# Patient Record
Sex: MALE | Race: WHITE | NOT HISPANIC OR LATINO | URBAN - METROPOLITAN AREA
[De-identification: names, ages, dates, MRNs, and addresses within clinical notes are randomized per-mention and may not be internally consistent; named-entity substitution may affect disease eponyms.]

---

## 2017-04-27 ENCOUNTER — HISTORICAL (OUTPATIENT)
Dept: INTERNAL MEDICINE | Facility: CLINIC | Age: 62
End: 2017-04-27

## 2017-09-15 ENCOUNTER — HISTORICAL (OUTPATIENT)
Dept: INTERNAL MEDICINE | Facility: CLINIC | Age: 62
End: 2017-09-15

## 2017-10-24 ENCOUNTER — HISTORICAL (OUTPATIENT)
Dept: ENDOSCOPY | Facility: HOSPITAL | Age: 62
End: 2017-10-24

## 2017-11-14 ENCOUNTER — HISTORICAL (OUTPATIENT)
Dept: ENDOSCOPY | Facility: HOSPITAL | Age: 62
End: 2017-11-14

## 2018-03-12 ENCOUNTER — HISTORICAL (OUTPATIENT)
Dept: INTERNAL MEDICINE | Facility: CLINIC | Age: 63
End: 2018-03-12

## 2018-07-04 ENCOUNTER — HOSPITAL ENCOUNTER (OUTPATIENT)
Dept: MEDSURG UNIT | Facility: HOSPITAL | Age: 63
End: 2018-07-06
Attending: HOSPITALIST | Admitting: HOSPITALIST

## 2018-07-05 LAB — CRC RECOMMENDATION EXT: NORMAL

## 2018-07-10 LAB
FINAL CULTURE: NORMAL
FINAL CULTURE: NORMAL

## 2018-10-29 ENCOUNTER — HISTORICAL (OUTPATIENT)
Dept: INTERNAL MEDICINE | Facility: CLINIC | Age: 63
End: 2018-10-29

## 2018-10-31 ENCOUNTER — HISTORICAL (OUTPATIENT)
Dept: ADMINISTRATIVE | Facility: HOSPITAL | Age: 63
End: 2018-10-31

## 2018-11-08 ENCOUNTER — HISTORICAL (OUTPATIENT)
Dept: RADIOLOGY | Facility: HOSPITAL | Age: 63
End: 2018-11-08

## 2019-06-26 ENCOUNTER — HISTORICAL (OUTPATIENT)
Dept: ADMINISTRATIVE | Facility: HOSPITAL | Age: 64
End: 2019-06-26

## 2019-06-26 LAB — PSA SERPL-MCNC: 3.5 NG/ML

## 2019-08-28 ENCOUNTER — HISTORICAL (OUTPATIENT)
Dept: RADIOLOGY | Facility: HOSPITAL | Age: 64
End: 2019-08-28

## 2020-02-04 ENCOUNTER — HISTORICAL (OUTPATIENT)
Dept: ADMINISTRATIVE | Facility: HOSPITAL | Age: 65
End: 2020-02-04

## 2020-02-07 ENCOUNTER — HISTORICAL (OUTPATIENT)
Dept: INTERNAL MEDICINE | Facility: CLINIC | Age: 65
End: 2020-02-07

## 2020-10-13 ENCOUNTER — HISTORICAL (OUTPATIENT)
Dept: ADMINISTRATIVE | Facility: HOSPITAL | Age: 65
End: 2020-10-13

## 2020-10-13 LAB
ABS NEUT (OLG): 4.39 X10(3)/MCL (ref 2.1–9.2)
ALBUMIN SERPL-MCNC: 4.2 GM/DL (ref 3.4–5)
ALBUMIN/GLOB SERPL: 0.9 RATIO (ref 1.1–2)
ALP SERPL-CCNC: 110 UNIT/L (ref 45–117)
ALT SERPL-CCNC: 21 UNIT/L (ref 12–78)
APPEARANCE, UA: CLEAR
AST SERPL-CCNC: 26 UNIT/L (ref 15–37)
BACTERIA #/AREA URNS AUTO: ABNORMAL /HPF
BASOPHILS # BLD AUTO: 0.1 X10(3)/MCL (ref 0–0.2)
BASOPHILS NFR BLD AUTO: 2 %
BILIRUB SERPL-MCNC: 0.6 MG/DL (ref 0.2–1)
BILIRUB UR QL STRIP: NEGATIVE
BILIRUBIN DIRECT+TOT PNL SERPL-MCNC: 0.2 MG/DL (ref 0–0.2)
BILIRUBIN DIRECT+TOT PNL SERPL-MCNC: 0.4 MG/DL
BUN SERPL-MCNC: 8 MG/DL (ref 7–18)
CALCIUM SERPL-MCNC: 9 MG/DL (ref 8.5–10.1)
CHLORIDE SERPL-SCNC: 99 MMOL/L (ref 98–107)
CO2 SERPL-SCNC: 34 MMOL/L (ref 21–32)
COLOR UR: ABNORMAL
CREAT SERPL-MCNC: 1.3 MG/DL (ref 0.6–1.3)
CREAT UR-MCNC: 78 MG/DL
DEPRECATED CALCIDIOL+CALCIFEROL SERPL-MC: 31.3 NG/ML (ref 30–80)
EOSINOPHIL # BLD AUTO: 0.2 X10(3)/MCL (ref 0–0.9)
EOSINOPHIL NFR BLD AUTO: 2 %
ERYTHROCYTE [DISTWIDTH] IN BLOOD BY AUTOMATED COUNT: 13.2 % (ref 11.5–14.5)
EST. AVERAGE GLUCOSE BLD GHB EST-MCNC: 111 MG/DL
GLOBULIN SER-MCNC: 4.5 GM/ML (ref 2.3–3.5)
GLUCOSE (UA): NEGATIVE
GLUCOSE SERPL-MCNC: 88 MG/DL (ref 74–106)
HBA1C MFR BLD: 5.5 % (ref 4.2–6.3)
HCT VFR BLD AUTO: 44.1 % (ref 40–51)
HGB BLD-MCNC: 15.4 GM/DL (ref 13.5–17.5)
HGB UR QL STRIP: 0.06 MG/DL
HYALINE CASTS #/AREA URNS LPF: ABNORMAL /LPF
IMM GRANULOCYTES # BLD AUTO: 0.01 10*3/UL
IMM GRANULOCYTES NFR BLD AUTO: 0 %
KETONES UR QL STRIP: NEGATIVE
LEUKOCYTE ESTERASE UR QL STRIP: NEGATIVE
LYMPHOCYTES # BLD AUTO: 1.8 X10(3)/MCL (ref 0.6–4.6)
LYMPHOCYTES NFR BLD AUTO: 25 %
MCH RBC QN AUTO: 32.3 PG (ref 26–34)
MCHC RBC AUTO-ENTMCNC: 34.9 GM/DL (ref 31–37)
MCV RBC AUTO: 92.5 FL (ref 80–100)
MONOCYTES # BLD AUTO: 0.7 X10(3)/MCL (ref 0.1–1.3)
MONOCYTES NFR BLD AUTO: 10 %
NEUTROPHILS # BLD AUTO: 4.39 X10(3)/MCL (ref 2.1–9.2)
NEUTROPHILS NFR BLD AUTO: 61 %
NITRITE UR QL STRIP: NEGATIVE
PH UR STRIP: 5.5 [PH] (ref 4.5–8)
PLATELET # BLD AUTO: 241 X10(3)/MCL (ref 130–400)
PMV BLD AUTO: 10.3 FL (ref 7.4–10.4)
POTASSIUM SERPL-SCNC: 3.2 MMOL/L (ref 3.5–5.1)
PROT SERPL-MCNC: 8.7 GM/DL (ref 6.4–8.2)
PROT UR QL STRIP: NEGATIVE
PROT UR STRIP-MCNC: <5 MG/DL
PROT/CREAT UR-RTO: <64.1 MG/GM
RBC # BLD AUTO: 4.77 X10(6)/MCL (ref 4.5–5.9)
RBC #/AREA URNS AUTO: ABNORMAL /HPF
SODIUM SERPL-SCNC: 136 MMOL/L (ref 136–145)
SP GR UR STRIP: 1 (ref 1–1.03)
SQUAMOUS #/AREA URNS LPF: ABNORMAL /LPF
UROBILINOGEN UR STRIP-ACNC: NORMAL
WBC # SPEC AUTO: 7.2 X10(3)/MCL (ref 4.5–11)
WBC #/AREA URNS AUTO: ABNORMAL /HPF

## 2021-08-10 ENCOUNTER — HISTORICAL (OUTPATIENT)
Dept: ADMINISTRATIVE | Facility: HOSPITAL | Age: 66
End: 2021-08-10

## 2021-08-10 LAB
ABS NEUT (OLG): 5.75 X10(3)/MCL (ref 2.1–9.2)
ALBUMIN SERPL-MCNC: 4.2 GM/DL (ref 3.4–4.8)
ALBUMIN/GLOB SERPL: 1.1 RATIO (ref 1.1–2)
ALP SERPL-CCNC: 82 UNIT/L (ref 40–150)
ALT SERPL-CCNC: 16 UNIT/L (ref 0–55)
AST SERPL-CCNC: 18 UNIT/L (ref 5–34)
BASOPHILS # BLD AUTO: 0.1 X10(3)/MCL (ref 0–0.2)
BASOPHILS NFR BLD AUTO: 1 %
BILIRUB SERPL-MCNC: 0.9 MG/DL
BILIRUBIN DIRECT+TOT PNL SERPL-MCNC: 0.3 MG/DL (ref 0–0.5)
BILIRUBIN DIRECT+TOT PNL SERPL-MCNC: 0.6 MG/DL (ref 0–0.8)
BUN SERPL-MCNC: 5.8 MG/DL (ref 8.4–25.7)
CALCIUM SERPL-MCNC: 9.6 MG/DL (ref 8.8–10)
CHLORIDE SERPL-SCNC: 102 MMOL/L (ref 98–107)
CHOLEST SERPL-MCNC: 248 MG/DL
CHOLEST/HDLC SERPL: 6 {RATIO} (ref 0–5)
CO2 SERPL-SCNC: 28 MMOL/L (ref 23–31)
CREAT SERPL-MCNC: 1.15 MG/DL (ref 0.73–1.18)
DEPRECATED CALCIDIOL+CALCIFEROL SERPL-MC: 18.2 NG/ML (ref 30–80)
EOSINOPHIL # BLD AUTO: 0.2 X10(3)/MCL (ref 0–0.9)
EOSINOPHIL NFR BLD AUTO: 2 %
ERYTHROCYTE [DISTWIDTH] IN BLOOD BY AUTOMATED COUNT: 13.2 % (ref 11.5–14.5)
EST CREAT CLEARANCE SER (OHS): 58.09 ML/MIN
EST. AVERAGE GLUCOSE BLD GHB EST-MCNC: 96.8 MG/DL
FOLATE SERPL-MCNC: 5.5 NG/ML (ref 7–31.4)
GLOBULIN SER-MCNC: 3.9 GM/DL (ref 2.4–3.5)
GLUCOSE SERPL-MCNC: 99 MG/DL (ref 82–115)
HBA1C MFR BLD: 5 %
HCT VFR BLD AUTO: 43.8 % (ref 40–51)
HDLC SERPL-MCNC: 45 MG/DL (ref 35–60)
HGB BLD-MCNC: 15.3 GM/DL (ref 13.5–17.5)
HIV 1+2 AB+HIV1 P24 AG SERPL QL IA: NONREACTIVE
IMM GRANULOCYTES # BLD AUTO: 0.02 10*3/UL
IMM GRANULOCYTES NFR BLD AUTO: 0 %
LDLC SERPL CALC-MCNC: 177 MG/DL (ref 50–140)
LYMPHOCYTES # BLD AUTO: 2.2 X10(3)/MCL (ref 0.6–4.6)
LYMPHOCYTES NFR BLD AUTO: 25 %
MCH RBC QN AUTO: 32.7 PG (ref 26–34)
MCHC RBC AUTO-ENTMCNC: 34.9 GM/DL (ref 31–37)
MCV RBC AUTO: 93.6 FL (ref 80–100)
MONOCYTES # BLD AUTO: 0.8 X10(3)/MCL (ref 0.1–1.3)
MONOCYTES NFR BLD AUTO: 9 %
NEUTROPHILS # BLD AUTO: 5.75 X10(3)/MCL (ref 2.1–9.2)
NEUTROPHILS NFR BLD AUTO: 63 %
NRBC BLD AUTO-RTO: 0 % (ref 0–0.2)
PLATELET # BLD AUTO: 226 X10(3)/MCL (ref 130–400)
PMV BLD AUTO: 10.6 FL (ref 7.4–10.4)
POTASSIUM SERPL-SCNC: 4 MMOL/L (ref 3.5–5.1)
PROT SERPL-MCNC: 8.1 GM/DL (ref 5.8–7.6)
RBC # BLD AUTO: 4.68 X10(6)/MCL (ref 4.5–5.9)
SODIUM SERPL-SCNC: 141 MMOL/L (ref 136–145)
T PALLIDUM AB SER QL: NONREACTIVE
T4 FREE SERPL-MCNC: 0.93 NG/DL (ref 0.7–1.48)
TRIGL SERPL-MCNC: 129 MG/DL (ref 34–140)
TSH SERPL-ACNC: 2.84 UIU/ML (ref 0.35–4.94)
VIT B12 SERPL-MCNC: 368 PG/ML (ref 213–816)
VLDLC SERPL CALC-MCNC: 26 MG/DL
WBC # SPEC AUTO: 9.1 X10(3)/MCL (ref 4.5–11)

## 2021-08-19 ENCOUNTER — HISTORICAL (OUTPATIENT)
Dept: RADIOLOGY | Facility: HOSPITAL | Age: 66
End: 2021-08-19

## 2021-10-05 ENCOUNTER — HISTORICAL (OUTPATIENT)
Dept: RADIOLOGY | Facility: HOSPITAL | Age: 66
End: 2021-10-05

## 2021-10-05 LAB — CREAT SERPL-MCNC: 1.2 MG/DL (ref 0.73–1.18)

## 2022-04-09 ENCOUNTER — HISTORICAL (OUTPATIENT)
Dept: ADMINISTRATIVE | Facility: HOSPITAL | Age: 67
End: 2022-04-09

## 2022-04-26 VITALS
BODY MASS INDEX: 22.57 KG/M2 | SYSTOLIC BLOOD PRESSURE: 125 MMHG | OXYGEN SATURATION: 100 % | HEIGHT: 66 IN | DIASTOLIC BLOOD PRESSURE: 76 MMHG | WEIGHT: 140.44 LBS

## 2022-04-30 NOTE — OP NOTE
DATE OF SURGERY:    10/24/2017    SURGEON:  Jordon Bazan MD    attending physician:  Jordon Bazan MD    PREOPERATIVE DIAGNOSIS:  Benign prostatic hypertrophy with thickened bladder.    POSTOPERATIVE DIAGNOSIS:  Benign prostatic hypertrophy with thickened bladder.    PROCEDURE PERFORMED:  Cystoscopy.    BLOOD LOSS:  None.    COMPLICATIONS:  None.    FINDINGS:  BPH with mild trabeculation.    HISTORY:  62-year-old male with some obstructive voiding complaints.  He had a CT scan showing thickened bladder wall.  He states since he had gallbladder surgery, he feels like he is leaking urine inside his belly.  He has not had any infections or problems.  He comes in today for cystoscopy.     Informed consent was obtained.  Taken to the cysto suite, placed supine.  He was prepped with Betadine.  Viscous lidocaine instilled into the urethra.  Flexible scope was inserted.  Urethra was unremarkable.  Prostatic fossa with bilobar hypertrophy.  Looking in the bladder, grade 1 trabeculation.  No tumor, stones, or foreign bodies seen.  Ureteral orifices normal size, shape, position, clear efflux bilaterally.  Dome and bladder neck reviewed by retroflexion of the scope.  No evidence of fistula, injury or scarring.    ASSESSMENT:  Benign prostatic hypertrophy with no evidence of injury or lesion.      PLAN:  I am going to see him as needed.      ______________________________  Jordon Bazan MD    TAB/MODL  DD:  10/24/2017  Time:  10:58AM  DT:  10/24/2017  Time:  11:18AM  Job #:  [id number]

## 2022-04-30 NOTE — ED PROVIDER NOTES
"   Patient:   Stoney Hanson             MRN: [id number]            FIN: [id number]               Age:   62 years     Sex:  Male     :  1955   Associated Diagnoses:   None   Author:   Homero Hernadez      Basic Information   Additional information: Chief Complaint from Nursing Triage Note : Chief Complaint   2018 19:24 CDT       Chief Complaint           States nausea, vomiting, diarrhea, rectal bleeding starting "again this afternoon".  States has been having nausea and diarrhea since "intestinal surgery".  .      History of Present Illness   please disregard, duplicate note      Health Status   Allergies:    Allergic Reactions (Selected)  Severity Not Documented  Aspirin- No reactions were documented.  Demerol HCl- Morphine.  Lortab- No reactions were documented.  Morphine- No reactions were documented.  Statins- No reactions were documented.  Sulfamethoxazole- No reactions were documented.,    Allergies (6) Active Reaction  aspirin None Documented  Demerol HCl Morphine  Lortab None Documented  morphine None Documented  statins None Documented  sulfamethoxazole None Documented  .   Medications:  (Selected)   Prescriptions  Prescribed  Artificial Tears preserved ophthalmic solution: 2 drop(s), Eye-Right, BID, PRN PRN as needed for dry eyes, # 15 mL, 0 Refill(s)  brimonidine 0.2% ophthalmic solution: 1 drop(s), OPTH, q8hr, PLEASE DISPENSE TWO 15-CC BOTTLES., # 30 mL, 6 Refill(s)  docusate sodium 100 mg oral tablet: 100 mg = 1 tab(s), Oral, BID, PRN PRN as needed for constipation, # 60 tab(s), 0 Refill(s)  lactulose 10 g/15 mL oral syrup: 20 gm = 30 mL, Oral, BID, PRN PRN as needed for constipation, # 300 mL, 0 Refill(s)  latanoprost 0.005% ophthalmic solution: 1 drop(s), OPTH, Once a day (at bedtime), # 7.5 mL, 6 Refill(s)  Documented Medications  Documented  ALPHAGAN P 0.15% SOL:   PRAVASTATIN SODIUM 10MG TAB: 10 mg = 1 tab(s), Oral, Daily  VIT D2 (1.25MG) 50,000U CAP: 20921 IntUnit = 1 cap(s), Oral, " qWeek.      Past Medical/ Family/ Social History   Medical history:    Active  Hyperlipidemia ([id number])  H/O: glaucoma ([id number])  Resolved  GERD (gastroesophageal reflux disease) ([id number]):  Resolved.  Comments:  9/15/2015 CDT 9:52 CDT - Federico ROTH, Kayla Jade  pt states unsure of dx of reflux.   Surgical history:    Colonoscopy on 2017 at 62 Years.  Comments:  2017 13:55 - Aurora Kumar RN  auto-populated from documented surgical case  Tendon Repair (Right) on 2015 at 59 Years.  Comments:  2015 16:16 - Gia Graham RN  auto-populated from documented surgical case  angiogram in  at 53 Years.  wisdom teeth.  exploratory on ABD.  intestinal rupture.  Cholecystectomy ([id number])..   Family history:    Heart disease  Father  Mother  Cataract.  Mother    Father  .   Social history:    Social & Psychosocial Habits    Alcohol  2017  Use: Never    Employment/School  2015  Status: Unemployed    Highest education: High school    Exercise  2015  Times per week: Daily    Comment: ride a bike - 2015 08:13 - Day Mansi GARCIA    Home/Environment  10/24/2017  Lives with: Mother    Alcohol abuse in household: No    Substance abuse in household: No    Smoker in household: No    Injuries/Abuse/Neglect in household: No    Nutrition/Health  2015  Type of diet: Regular    Wants to lose weight: No    Sleeping concerns: Yes    Feels highly stressed: No    Substance Abuse  07/10/2015  Use: Never    Tobacco  2017  Use: Never smoker  .   Problem list:    Active Problems (8)  BPH with urinary obstruction   GERD (gastroesophageal reflux disease)   Glaucoma, both eyes   H/O: glaucoma   HLD (hyperlipidemia)   Hyperlipidemia   Other specified injury of intrinsic muscle and tendon at ankle and foot level, right foot, initial e   Vitamin D deficiency   .      Physical Examination               Vital  Signs   Vital Signs   7/4/2018 19:24 CDT       Temperature Oral          36.5 DegC                             Temperature Oral (calculated)             97.70 DegF                             Peripheral Pulse Rate     75 bpm                             Respiratory Rate          17 br/min                             SpO2                      100 %                             Oxygen Therapy            Room air                             Systolic Blood Pressure   133 mmHg                             Diastolic Blood Pressure  83 mmHg  .   Measurements   7/4/2018 19:24 CDT       Weight Dosing             69 kg                             Weight Measured and Calculated in Lbs     152.12 lb                             Weight Estimated          69 kg                             Height/Length Estimated   167.74 cm                             Body Mass Index Estimated 24.52 kg/m2  .   Basic Oxygen Information   7/4/2018 19:24 CDT       SpO2                      100 %                             Oxygen Therapy            Room air  .

## 2022-04-30 NOTE — OP NOTE
DATE OF SURGERY:    11/14/2017    SURGEON:  Domingo Gonzalez MD    attending physician:  Dr. Domingo Gonzalez MD    RESIDENT:  Yury Brewster MD.    PROCEDURE PERFORMED:  Colonoscopy.    PREOPERATIVE DIAGNOSIS:  Colonoscopy for screening.    POSTOPERATIVE DIAGNOSIS:  Colonoscopy for screening.    FINDINGS:  Normal colon.    COMPLICATIONS:  None.    SPECIMENS:  None.    ESTIMATED BLOOD LOSS:  Zero.    PROCEDURE IN DETAIL:  After appropriate consents were signed, the patient was taken to the endoscopy suite and placed in left lateral decubitus position after appropriate and audible surgical time-out took place between Anesthesia, Nursing and Surgery.  After which point, MAC anesthesia was induced.  Digital rectal exam showed no significant findings.  After which point, the lubricated colonoscope was inserted in the patient's rectum and taken all the way up to the cecum.  The ileocecal valve and appendiceal stoma were clearly visualized.  After which point, the scope was withdrawn in a 360-degree fashion, visualizing all areas of colonic mucosa.       No significant polyps or fissures or diverticula were found.  The prep was adequate.  The scope was completely withdrawn in 360-degree fashion to the ascending, transverse, descending, sigmoid colon.  In the rectum, the scope was slowly withdrawn.  The patient had a very narrow and tight rectal vault and we were unable to retroflex safely.  The scope was gently withdrawn through the anal canal with full visualization.  There were no fissures, polyps, condylomata or papilloma or obvious congested hemorrhoidal tissue.  The scope was completely removed.  The patient tolerated the procedure well with no obvious complications.  The patient was taken to PACU in good condition.  Dr. Gonzalez was there for the entirety of the procedure.    IMPRESSION/RECOMMENDATIONS:  Negative colonoscopy.  The patient can follow up in 10 years for further  scope.        ______________________________  OLEKSANDR CROW MD    ______________________________  MD MARK Cedeno  DD:  11/14/2017  Time:  02:02PM  DT:  11/14/2017  Time:  02:26PM  Job #:  [id number]

## 2022-04-30 NOTE — ED PROVIDER NOTES
"   Patient:   Stoney Hanson             MRN: [id number]            FIN: [id number]               Age:   62 years     Sex:  Male     :  1955   Associated Diagnoses:   Abdominal pain, acute, generalized; Nephrolithiasis; Leukocytosis; BPH with urinary obstruction   Author:   Alberto BADILLO, Ron VARMA      Basic Information   Time seen: Immediately upon arrival.   History source: Patient.   Arrival mode: Private vehicle.   History limitation: None.   Additional information: Chief Complaint from Nursing Triage Note : Chief Complaint   2018 19:24 CDT       Chief Complaint           States nausea, vomiting, diarrhea, rectal bleeding starting "again this afternoon".  States has been having nausea and diarrhea since "intestinal surgery".  .      History of Present Illness   The patient presents with rectal bleeding.  The onset was 1  days ago.  The course/duration of symptoms is episodic: with a few episodes.  Vomiting: degree minimal.  Rectal bleed: blood with stool and degree moderate.  There are exacerbating factors including eating and drinking.  The relieving factor is none.  Risk factors consist of Multiple abdominal surgeries.  Prior episodes: occasional.  Therapy today: none.  Associated symptoms: abdominal pain, nausea, diarrhea, rectal pain and denies fever.        Review of Systems   Constitutional symptoms:  No fever, no chills, no sweats, no weakness.    Skin symptoms:  No rash, no lesion.    Eye symptoms:  No recent vision problems,    Respiratory symptoms:  No shortness of breath, no orthopnea, no cough, no sputum production.    Cardiovascular symptoms:  No chest pain, no palpitations, no tachycardia, no syncope, no diaphoresis, no peripheral edema.    Gastrointestinal symptoms:  Negative except as documented in HPI, no vomiting, no constipation.    Genitourinary symptoms:  No dysuria, no hematuria, no testicular pain.    Musculoskeletal symptoms:  No back pain, no Joint pain.    Neurologic " symptoms:  No headache, no dizziness.              Additional review of systems information: All other systems reviewed and otherwise negative.      Health Status   Allergies:    Allergic Reactions (Selected)  Severity Not Documented  Aspirin- No reactions were documented.  Demerol HCl- Morphine.  Lortab- No reactions were documented.  Morphine- No reactions were documented.  Statins- No reactions were documented.  Sulfamethoxazole- No reactions were documented.,    Allergies (6) Active Reaction  aspirin None Documented  Demerol HCl Morphine  Lortab None Documented  morphine None Documented  statins None Documented  sulfamethoxazole None Documented  .   Medications:  (Selected)   Inpatient Medications  Ordered  Bentyl 10 mg/mL injectable solution: 20 mg, form: Injection, IM, Once-NOW, first dose 07/04/18 19:46:00 CDT, stop date 07/04/18 19:46:00 CDT, STAT  Protonix: 40 mg, form: Injection, IV Piggyback, Once-NOW, Infuse over: 30 minute(s), first dose 07/04/18 19:46:00 CDT, stop date 07/04/18 19:46:00 CDT, STAT  Prescriptions  Prescribed  Artificial Tears preserved ophthalmic solution: 2 drop(s), Eye-Right, BID, PRN PRN as needed for dry eyes, # 15 mL, 0 Refill(s)  brimonidine 0.2% ophthalmic solution: 1 drop(s), OPTH, q8hr, PLEASE DISPENSE TWO 15-CC BOTTLES., # 30 mL, 6 Refill(s)  docusate sodium 100 mg oral tablet: 100 mg = 1 tab(s), Oral, BID, PRN PRN as needed for constipation, # 60 tab(s), 0 Refill(s)  lactulose 10 g/15 mL oral syrup: 20 gm = 30 mL, Oral, BID, PRN PRN as needed for constipation, # 300 mL, 0 Refill(s)  latanoprost 0.005% ophthalmic solution: 1 drop(s), OPTH, Once a day (at bedtime), # 7.5 mL, 6 Refill(s)  Documented Medications  Documented  ALPHAGAN P 0.15% SOL:   PRAVASTATIN SODIUM 10MG TAB: 10 mg = 1 tab(s), Oral, Daily  VIT D2 (1.25MG) 50,000U CAP: 44752 IntUnit = 1 cap(s), Oral, qWeek.      Past Medical/ Family/ Social History   Medical history:    Active  Hyperlipidemia ([id number])  H/O:  glaucoma ([id number])  Resolved  GERD (gastroesophageal reflux disease) ([id number]):  Resolved.  Comments:  9/15/2015 CDT 9:52 CDT - Federico ROTH, Kayla Jade  pt states unsure of dx of reflux.   Surgical history:    Colonoscopy on 2017 at 62 Years.  Comments:  2017 13:55 - Aurora Kumar RN  auto-populated from documented surgical case  Tendon Repair (Right) on 2015 at 59 Years.  Comments:  2015 16:16 - Gia Graham RN  auto-populated from documented surgical case  angiogram in  at 53 Years.  wisdom teeth.  exploratory on ABD.  intestinal rupture.  Cholecystectomy ([id number])..   Family history:    Heart disease  Father  Mother  Cataract.  Mother    Father  .   Social history:    Social & Psychosocial Habits    Alcohol  2017  Use: Never    Employment/School  2015  Status: Unemployed    Highest education: High school    Exercise  2015  Times per week: Daily    Comment: ride a bike - 2015 08:13 - Day Mansi GARCIA    Home/Environment  10/24/2017  Lives with: Mother    Alcohol abuse in household: No    Substance abuse in household: No    Smoker in household: No    Injuries/Abuse/Neglect in household: No    Nutrition/Health  2015  Type of diet: Regular    Wants to lose weight: No    Sleeping concerns: Yes    Feels highly stressed: No    Substance Abuse  07/10/2015  Use: Never    Tobacco  2017  Use: Never smoker  , Alcohol use: Denies, Tobacco use: Denies, Drug use: Denies.   Problem list:    Active Problems (8)  BPH with urinary obstruction   GERD (gastroesophageal reflux disease)   Glaucoma, both eyes   H/O: glaucoma   HLD (hyperlipidemia)   Hyperlipidemia   Other specified injury of intrinsic muscle and tendon at ankle and foot level, right foot, initial e   Vitamin D deficiency   .      Physical Examination               Vital Signs   Vital Signs   2018 19:24 CDT        Temperature Oral          36.5 DegC                             Temperature Oral (calculated)             97.70 DegF                             Peripheral Pulse Rate     75 bpm                             Respiratory Rate          17 br/min                             SpO2                      100 %                             Oxygen Therapy            Room air                             Systolic Blood Pressure   133 mmHg                             Diastolic Blood Pressure  83 mmHg  .      Vital Signs (last 24 hrs)_____  Last Charted___________  Temp Oral     36.5 DegC  (JUL 04 19:24)  Heart Rate Peripheral   75 bpm  (JUL 04 19:24)  Resp Rate         17 br/min  (JUL 04 19:24)  SBP      133 mmHg  (JUL 04 19:24)  DBP      83 mmHg  (JUL 04 19:24)  SpO2      100 %  (JUL 04 19:24)  .   Measurements   7/4/2018 19:24 CDT       Weight Dosing             69 kg                             Weight Measured and Calculated in Lbs     152.12 lb                             Weight Estimated          69 kg                             Height/Length Estimated   167.74 cm                             Body Mass Index Estimated 24.52 kg/m2  .   Basic Oxygen Information   7/4/2018 19:24 CDT       SpO2                      100 %                             Oxygen Therapy            Room air  .   General:  Alert, no acute distress, anxious.    Skin:  Warm, dry, pink, intact, no pallor, no rash.    Head:  Normocephalic, atraumatic.    Neck:  Supple, no JVD.    Eye:  Pupils are equal, round and reactive to light, normal conjunctiva.    Ears, nose, mouth and throat:  Oral mucosa moist.   Cardiovascular:  Regular rate and rhythm, No murmur, Normal peripheral perfusion, No edema.    Respiratory:  Lungs are clear to auscultation, respirations are non-labored, breath sounds are equal, Symmetrical chest wall expansion.    Gastrointestinal:  Soft, Non distended, No organomegaly, Palpable bladder, Tenderness: Moderate, generalized, Guarding:  Minimal, voluntary, Rebound: Negative, Bowel sounds: Hyperactive, Rectal exam: Normal tone, stool color clear, guaiac (positive,  OK), no hemorrhoids.    Back:  Nontender, Normal range of motion.    Musculoskeletal:  Normal ROM, normal strength, no swelling.    Neurological:  Alert and oriented to person, place, time, and situation, No focal neurological deficit observed, normal motor observed, normal speech observed, normal coordination observed.    Psychiatric:  Cooperative, appropriate mood & affect.       Medical Decision Making   Documents reviewed:  Emergency department records, prior records.    Results review:  Lab results : [facility]   7/4/2018 19:47 CDT       WBC                       16.5 x10(3)/mcL  HI                             Hgb                       15.7 gm/dL                             Hct                       43.5 %                             Platelet                  263 x10(3)/mcL                             Neutro Auto               83 x10(3)/mcL  NA                             Lymph Auto                9 %  LOW                             Mono Auto                 6 %    , Lab results : [facility]   7/4/2018 20:09 CDT       UA Spec Grav              1.003  LOW                             UA pH                     6.0                             UA Nitrite                Negative                             UA Leuk Est               Negative                             UA WBC Interp             0-2 /HPF                             UA RBC Interp             0-2                             UA Bact Interp            None Seen    ,    No qualifying data available.    Radiology results:  Rad Results (ST)  < 12 hrs   Accession: [id number]  Order: CT Abdomen and Pelvis W Contrast  Report Dt/Tm: 07/04/2018 21:40  Report:   EXAMINATION: CT abdomen pelvis with contrast     EXAMINATION DATE: 7/4/2018     COMPARISON: CT abdomen pelvis from March 27, 2017     TECHNIQUE: Multiple  cross-sectional images of the abdomen and pelvis  were obtained after the intravenous ministration of contrast. Coronal  and sagittal reformatted images were obtained.     CLINICAL HISTORY: Nausea and vomiting with rectal bleeding.     FINDINGS:     Dependent atelectatic changes of the lungs. The heart size is within  normal limits.     Several hypodensities of the liver identified which are too small to  characterize and likely represent simple cysts. The bladder is  surgically absent. The spleen, adrenals, and pancreas are normal. The  kidneys are symmetric in size with a simple cyst involving the right  kidney nonobstructing changes involving the lower pole the left  kidney.     The small bowel is of normal caliber. The colon is decompressed  distally. Proximally the colon is gaseously distended. At the abrupt  transition point no mass is identified. No adjacent lymphadenopathy.  Normal-appearing appendix.     The prostate is enlarged with dystrophic calcification. Bladder is  distended with symmetric wall thickening suggestive of chronic bladder  outlet obstruction. No free fluid in the abdomen or pelvis. No  lymphadenopathy.     No suspicious osseous lesions. Soft tissues are normal.     IMPRESSION:   1. Abrupt transition of the colon caliber involving the mid aspect of  the transverse colon. No definitive mass is identified. Could relate  to peristalsis, however, underlying mass is not entirely excluded.  Recommend direct visualization.  2. Nonobstructing calculus involving the lower pole the left kidney.  3. Other secondary findings as above.        .      Impression and Plan   Diagnosis   Abdominal pain, acute, generalized (QXQ30-SW R10.84)   Nephrolithiasis (GST16-UF N20.0)   Leukocytosis (TFG60-LG D72.829)   BPH with urinary obstruction (IQY33-HH N40.1)      Calls-Consults   -  7/4/2018 21:47:00 , Medicine, consult.    Plan   Condition: Stable.    Disposition: Admit time  7/4/2018 21:47:00, Place in  Observation Unit.    Counseled: Patient, Regarding diagnosis, Regarding diagnostic results, Regarding treatment plan, Patient indicated understanding of instructions.

## 2022-04-30 NOTE — PROGRESS NOTES
Patient:   Stoney Hanson             MRN: [id number]            FIN: [id number]               Age:   63 years     Sex:  Male     :  1955   Associated Diagnoses:   None   Author:   Ximena BADILLO, [place] reviewed: Will discuss with patient during follow up appointment on  Oct. 31, 2018 at 10:50am.

## 2022-05-02 NOTE — HISTORICAL [facility]
This is a historical note converted from Thor. Formatting and pictures may have been removed.  Please reference Thor for original formatting and attached multimedia. Chief Complaint  f/u for bph  History of Present Illness  Pt with BPH.? Here with what appears to epididymitis.? Scrotal US nonimpressive.? Denies hernia symptoms.  ?   Took flomax and did not help.? Given uroxatrol and proscar.? Did not fill.? Does not have any complauints abouat his urine today though I doubt.? Needs a PSA.  Review of Systems  12 point ROS negative other than HPI  Physical Exam  Vitals & Measurements  T:?36.4? ?C (Oral)? HR:?66(Peripheral)? RR:?18? BP:?130/82? SpO2:?100%? WT:?68.4?kg?  GEN: WNWD NAD  HEENT: NCAT  CV: RRR  RESP: unlabored  ABD: soft NT/ND  : bladder nondistended  EXT: no C/C/E  NEURO: AAOx4 no focal deficits  Assessment/Plan  1.?BPH with urinary obstruction?N40.1  ?-PSA today.? Take meds at his discretion.? PRN.? Follow up with PCP for yearly screening.  Ordered:  Office/Outpatient Visit Level 2 Established [id number] PC, BPH with urinary obstruction, [facility], 06/26/19 11:32:00 CDT  Prostate Specific Antigen, Now collect, 06/26/19 11:32:00 CDT, Blood, Stop date 06/26/19 11:33:00 CDT, Lab Collect, BPH with urinary obstruction, 06/26/19 11:32:00 CDT  ?  Orders:  Clinic Follow-up PRN, 06/26/19 11:32:00 CDT   Problem List/Past Medical History  Ongoing  BPH with urinary obstruction  GERD (gastroesophageal reflux disease)  Glaucoma, both eyes  HLD (hyperlipidemia)  Hyperlipidemia  Knowledge deficit  Other specified injury of intrinsic muscle and tendon at ankle and foot level, right foot, initial encounter  Vitamin D deficiency  Historical  GERD (gastroesophageal reflux disease)  H/O: glaucoma  Procedure/Surgical History  Biopsy Gastrointestinal (None) (07/06/2018)  Colonoscopy (None) (07/06/2018)  Colonoscopy, flexible; with biopsy, single or multiple (07/06/2018)  Excision of Descending Colon, Via Natural or Artificial  Opening Endoscopic, Diagnostic (07/06/2018)  Excision of Sigmoid Colon, Via Natural or Artificial Opening Endoscopic, Diagnostic (07/06/2018)  Colonoscopy (11/14/2017)  Colonoscopy, flexible; diagnostic, including collection of specimen(s) by brushing or washing, when performed (separate procedure) (11/14/2017)  Inspection of Lower Intestinal Tract, Via Natural or Artificial Opening Endoscopic (11/14/2017)  Cystourethroscopy (separate procedure) (10/24/2017)  Inspection of Bladder, Via Natural or Artificial Opening Endoscopic (10/24/2017)  Arthrotomy, with synovectomy, ankle; including tenosynovectomy (09/22/2015)  Other plastic operations on tendon (09/22/2015)  Other tenonectomy (09/22/2015)  Repair, dislocating peroneal tendons; without fibular osteotomy (09/22/2015)  Synovectomy of ankle (09/22/2015)  Tendon Repair (Right) (09/22/2015)  angiogram (2008)  Cholecystectomy  exploratory on ABD  intestinal rupture  wisdom teeth   Medications  brimonidine 0.2% ophthalmic solution, 1 drop(s), Eye-Both, q8hr, 6 refills  latanoprost 0.005% ophthalmic solution, 1 drop(s), Eye-Both, Once a day (at bedtime), 6 refills  Pepcid 20 mg oral tablet, 20 mg= 1 tab(s), Oral, BID,? ?Not taking  Proscar 5 mg oral tablet, 5 mg= 1 tab(s), Oral, Daily, 11 refills,? ?Not taking  Uroxatral 10 mg oral tablet, extended release, 10 mg= 1 tab(s), Oral, qPM, 11 refills,? ?Not taking  Allergies  Demerol HCl?(Morphine)  Lortab  aspirin  morphine  statins  sulfamethoxazole  Social History  Abuse/Neglect  No, 06/13/2019  Alcohol  Never, 01/23/2017  Employment/School  Unemployed, Highest education level: High school., 09/03/2015  Exercise  Exercise frequency: Daily., 09/03/2015  Home/Environment  Lives with Mother. Alcohol abuse in household: No. Substance abuse in household: No. Smoker in household: No. Injuries/Abuse/Neglect in household: No., 10/24/2017  Nutrition/Health  Regular, Wants to lose weight: No. Sleeping concerns: Yes. Feels highly  stressed: No., 2015  Sexual  Sexual orientation: Straight or heterosexual. Gender Identity Identifies as male., 05/15/2019  Substance Use  Never, 07/10/2015  Tobacco  Never (less than 100 in lifetime), N/A, 2019  Never (less than 100 in lifetime), N/A, 2019  Family History  Cataract.: Mother.  : Father.  Heart disease: Mother and Father.  Immunizations  Vaccine Date Status   tetanus-diphtheria toxoids 2015 Given   Health Maintenance  Health Maintenance  ???Pending?(in the next year)  ??? ??OverDue  ??? ? ? ?Diabetes Screening due??and every?  ??? ? ? ?Alcohol Misuse Screening due??19??and every 1??year(s)  ??? ??Due?  ??? ? ? ?ADL Screening due??19??and every 1??year(s)  ??? ? ? ?Zoster Vaccine due??19??and every 100??year(s)  ??? ??Due In Future?  ??? ? ? ?Obesity Screening not due until??20??and every 1??year(s)  ??? ? ? ?Blood Pressure Screening not due until??20??and every 1??year(s)  ??? ? ? ?Body Mass Index Check not due until??20??and every 1??year(s)  ??? ? ? ?Depression Screening not due until??20??and every 1??year(s)  ???Satisfied?(in the past 1 year)  ??? ??Satisfied?  ??? ? ? ?Blood Pressure Screening on??19.??Satisfied by Destiny Ricci LPN  ??? ? ? ?Body Mass Index Check on??19.??Satisfied by Stephani Ricci LPNe  ??? ? ? ?Colorectal Screening on??18.  ??? ? ? ?Depression Screening on??19.??Satisfied by Destiny Ricci LPN  ??? ? ? ?Diabetes Screening on??19.??Satisfied by Dane Guzman Jr.  ??? ? ? ?Influenza Vaccine on??19.??Satisfied by Krupa Arceo LPN  ??? ? ? ?Lipid Screening on??10/29/18.??Satisfied by Luzmaria Rodarte  ??? ? ? ?Obesity Screening on??19.??Satisfied by Destiny Ricci LPN  ??? ? ? ?Smoking Cessation (Coronary Artery Disease) on??18.??Satisfied by Vin Ornelas RN, Yahir KERR  ?
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
## 2022-05-02 NOTE — HISTORICAL [facility]ER
This is a historical note converted from [employer]. Formatting and pictures may have been removed.  Please reference [facility] for original formatting and attached multimedia. Chief Complaint  Follow Up. Pt states that he is having trouble taking what he has in his mind and putting that into words x several months  History of Present Illness  A 65 year old male with history of BPH followed by Urology, hypertension, hyperlipidemia, and chronic kidney disease presents to clinic for a follow up?appointment.  He states that he has been feeling fine.  He denies any nausea, vomiting, and fever. ?Patient also states no changes?in weight and appetite.  Patient denies chest pain and shortness of breath.?  He does report that he has a difficulty time talking. He states he knows what he wants to say, but it does not come out. He does report some memory issues.  He denies any recent trauma. He states that he woke up like this sometime around April.  Review of Systems  ???? Constitutional: No fever, No chills, No sweats, No weakness, No fatigue, No decreased activity.  ?????Eye: No recent visual problem, No icterus, No double vision.  ?????Ear/Nose/Mouth/Throat: No nasal congestion, No sore throat.  ?????Respiratory: No shortness of breath, No cough, No sputum production, No hemoptysis, No wheezing, No cyanosis.  ?????Cardiovascular: No chest pain, No palpitations, No peripheral edema, No syncope.  ?????Gastrointestinal: No nausea, No vomiting, No diarrhea, No constipation, No hematemesis.  ?????Genitourinary: No dysuria, No hematuria, No change in urine stream, No urethral discharge.  ?????Hematology/Lymphatics: No bruising tendency, No bleeding tendency.  ?????Endocrine: No polyuria, No cold intolerance, No heat intolerance.  ?????Immunologic: Not immunocompromised, No recurrent fevers.  ?????Musculoskeletal: No joint pain, No claudication.  ?????Integumentary: No rash, No pruritus, No abrasions, No petechiae.  ?????Neurologic:  Alert and oriented X4, No abnormal balance, No numbness, No tingling.  ?????Psychiatric: No anxiety, no depression, Not suicidal, Not delusional, No hallucinations.  Physical Exam  Vitals & Measurements  T:?36.6? ?C (Oral)? HR:?61(Peripheral)? BP:?125/76?  HT:?168.00?cm? WT:?63.700?kg? BMI:?22.57?  ???? General: Alert and oriented, No acute distress.  ?????Eye: Pupils are equal, round and reactive to light, Extraocular movements are intact, Normal conjunctiva, Vision unchanged.  ?????HENT: Normocephalic, Normal hearing, Oral mucosa is moist.  ?????Neck: Supple, No carotid bruit, No jugular venous distention, No thyromegaly.  ?????Respiratory: Lungs are clear to auscultation, Respirations are non-labored, Breath sounds are equal, Symmetrical chest wall expansion, No chest wall tenderness.  ?????Cardiovascular: Normal rate, Regular rhythm, No murmur, No gallop, Good pulses equal in all extremities, Normal peripheral perfusion, No edema.  ?????Gastrointestinal: Soft, Non-tender, Non-distended, Normal bowel sounds.  ?????Genitourinary: No costovertebral angle tenderness, No suprapubic tenderness.  ?????Musculoskeletal: Normal range of motion, Normal strength, No swelling, No deformity, Normal gait.  ?????Integumentary: Warm, No pallor, No rash.  ?????Neurologic: Alert, Oriented, Normal sensory, Normal motor function, No focal deficits, Cranial Nerves II-XII are grossly intact.  ?????Psychiatric: Cooperative, Appropriate mood & affect.  Assessment/Plan  1.?Cognitive impairment ? R41.89  Short term memory loss  Patient scored 25 on MMSE  Broca aphasia?-Patient speech is changed from last time I spoke with him.  MRI for further evaluation. GFR 72  B12 ordered  2.?Chronic kidney disease, stage 2 (mild) ? N18.2  Repeat labs.  Increase water intake and avoiding nephrotoxic drugs  3.?BPH with urinary obstruction ? N40.1  Continue to follow up with Nephrology  4.?HLD (hyperlipidemia) ? E78.5  Continue statin  5.?GERD  (gastroesophageal reflux disease) ? K21.9  Reports no new complaints  Managed without medications  6.?Glaucoma, both eyes ? H40.9  Continue following Ophthalmology  7.?Vitamin D deficiency ? E55.9  Replete  8.?Healthcare maintenance ? Z00.00  Colon cancer screening- 2018, per note- patient should be evaluated for IBD, will need a follow up with GI.  He is not interested in Shingles vaccination at this time.  Pneumococcal vaccine at next visit.  Orders:  CBC w/ Auto Diff, Routine collect, *Est. 08/10/21 3:00:00 CDT, Blood, Order for future visit, *Est. Stop date 08/10/21 3:00:00 CDT, Lab Collect, Screening due, 08/10/21 13:54:00 CDT  Clinic Follow up, *Est. 09/07/21 3:00:00 CDT, Order for future visit, Cognitive impairment  Chronic kidney disease, stage 2 (mild)  BPH with urinary obstruction  HLD (hyperlipidemia)  GERD (gastroesophageal reflux disease)  Glaucoma, both eyes  Vitamin D deficien...  Comprehensive Metabolic Panel, Routine collect, *Est. 08/10/21 3:00:00 CDT, Blood, Order for future visit, *Est. Stop date 08/10/21 3:00:00 CDT, Lab Collect, Screening due, 08/10/21 13:54:00 CDT  Folate Level, Routine collect, *Est. 08/10/21 3:00:00 CDT, Blood, Order for future visit, *Est. Stop date 08/10/21 3:00:00 CDT, Lab Collect, Cognitive impairment, 08/10/21 13:55:00 CDT  Free T4, Routine collect, *Est. 08/10/21 3:00:00 CDT, Blood, Order for future visit, *Est. Stop date 08/10/21 3:00:00 CDT, Lab Collect, Cognitive impairment, 08/10/21 13:54:00 CDT  Hemoglobin A1C UHC, Routine collect, *Est. 08/10/21 3:00:00 CDT, Blood, Order for future visit, *Est. Stop date 08/10/21 3:00:00 CDT, Lab Collect, Screening due, 08/10/21 13:54:00 CDT  HIV 1 and 2, Routine collect, *Est. 08/10/21 3:00:00 CDT, Blood, Order for future visit, *Est. Stop date 08/10/21 3:00:00 CDT, Lab Collect, Cognitive impairment, 08/10/21 13:54:00 CDT  Lipid Panel, Routine collect, *Est. 08/10/21 3:00:00 CDT, Blood, Order for future visit, *Est. Stop  date 08/10/21 3:00:00 CDT, Lab Collect, HLD (hyperlipidemia), 08/10/21 13:54:00 CDT  MRI Brain W/O Contrast, Routine, 08/10/21 13:57:00 CDT, Other (please specify), Neuro deficit, acute, stroke suspected, None, Ambulatory, Patient Has IV?, Rad Type, Order for future visit, Cognitive impairment  Neurologic abnormality  Aphasia, Schedule this test, [facility]...  Office/Outpatient Visit Level 4 Established [id number] PC, Cognitive impairment  Chronic kidney disease, stage 2 (mild)  BPH with urinary obstruction  HLD (hyperlipidemia)  GERD (gastroesophageal reflux disease)  Glaucoma, both eyes  Vitamin D deficiency  Healthcare maintenance, [facility],...  Syphilis Antibody (with Reflex RPR), Routine collect, *Est. 08/10/21 3:00:00 CDT, Blood, Order for future visit, *Est. Stop date 08/10/21 3:00:00 CDT, Lab Collect, Cognitive impairment, 08/10/21 13:55:00 CDT  Thyroid Stimulating Hormone, Routine collect, *Est. 08/10/21 3:00:00 CDT, Blood, Order for future visit, *Est. Stop date 08/10/21 3:00:00 CDT, Lab Collect, Cognitive impairment, 08/10/21 13:54:00 CDT  Vitamin B12 Level, Routine collect, *Est. 08/10/21 3:00:00 CDT, Blood, Order for future visit, *Est. Stop date 08/10/21 3:00:00 CDT, Lab Collect, Cognitive impairment, 08/10/21 13:55:00 CDT  Vitamin D, 25-Hydroxy Level, Routine collect, *Est. 08/10/21 3:00:00 CDT, Blood, Order for future visit, *Est. Stop date 08/10/21 3:00:00 CDT, Lab Collect, Cognitive impairment, 08/10/21 13:55:00 CDT   Problem List/Past Medical History  Ongoing  BPH with urinary obstruction  GERD (gastroesophageal reflux disease)  Glaucoma, both eyes  HLD (hyperlipidemia)  Hyperlipidemia  Knowledge deficit  Other specified injury of intrinsic muscle and tendon at ankle and foot level, right foot, initial encounter  Vitamin D deficiency  Historical  GERD (gastroesophageal reflux disease)  H/O: glaucoma  Procedure/Surgical History  Repair Right Hand Skin, External Approach  (11/24/2020)  Simple repair of superficial wounds of scalp, neck, axillae, external genitalia, trunk and/or extremities (including hands and feet); 2.5 cm or less (11/24/2020)  Biopsy Gastrointestinal (None) (07/06/2018)  Colonoscopy (None) (07/06/2018)  Colonoscopy, flexible; with biopsy, single or multiple (07/06/2018)  Excision of Descending Colon, Via Natural or Artificial Opening Endoscopic, Diagnostic (07/06/2018)  Excision of Sigmoid Colon, Via Natural or Artificial Opening Endoscopic, Diagnostic (07/06/2018)  Colonoscopy (11/14/2017)  Colonoscopy, flexible; diagnostic, including collection of specimen(s) by brushing or washing, when performed (separate procedure) (11/14/2017)  Inspection of Lower Intestinal Tract, Via Natural or Artificial Opening Endoscopic (11/14/2017)  Cystourethroscopy (separate procedure) (10/24/2017)  Inspection of Bladder, Via Natural or Artificial Opening Endoscopic (10/24/2017)  Arthrotomy, with synovectomy, ankle; including tenosynovectomy (09/22/2015)  Other plastic operations on tendon (09/22/2015)  Other tenonectomy (09/22/2015)  Repair, dislocating peroneal tendons; without fibular osteotomy (09/22/2015)  Synovectomy of ankle (09/22/2015)  Tendon Repair (Right) (09/22/2015)  angiogram (2008)  Cholecystectomy  exploratory on ABD  intestinal rupture  wisdom teeth   Medications  bacitracin topical, 1 megan, TOP, Once  brimonidine 0.2% ophthalmic solution, 1 drop(s), Eye-Left, q8hr, 11 refills  diclofenac 1% topical gel, 1 megan, TOP, QID, PRN,? ?Not taking  latanoprost 0.005% ophthalmic solution, 1 drop(s), Eye-Left, Once a day (at bedtime), 11 refills  Allergies  Demerol HCl?(Morphine)  Lortab  aspirin  egg-containing compound?(UNKNOWN)  morphine  statins  sulfamethoxazole  Social History  Abuse/Neglect  No, 08/10/2021  No, 07/20/2021  Alcohol  Past, Wine, 11/30/2020  Employment/School  Unemployed, Highest education level: High school., 09/03/2015  Exercise  Exercise frequency:  Daily., 2015  Home/Environment  Lives with Mother. Alcohol abuse in household: No. Substance abuse in household: No. Smoker in household: No. Injuries/Abuse/Neglect in household: No., 10/24/2017  Nutrition/Health  Regular, Wants to lose weight: No. Sleeping concerns: Yes. Feels highly stressed: No., 2015  Sexual  Gender Identity Identifies as female., 2021  Substance Use  Never, 07/10/2015  Tobacco  Never (less than 100 in lifetime), N/A, 08/10/2021  Never (less than 100 in lifetime), N/A, 2021  Family History  Cataract.: Mother.  : Father.  Heart disease: Mother and Father.  Immunizations  Vaccine Date Status   tetanus/diphtheria/pertussis, acel(Tdap) 2020 Given   pneumococcal 13-valent conjugate vaccine 10/13/2020 Given   influenza virus vaccine, inactivated 10/13/2020 Given   tetanus-diphtheria toxoids 2015 Given   Health Maintenance  Health Maintenance  ???Pending?(in the next year)  ??? ??OverDue  ??? ? ? ?Alcohol Misuse Screening due??19??and every 1??year(s)  ??? ? ? ?Advance Directive due??21??and every 1??year(s)  ??? ? ? ?Cognitive Screening due??21??and every 1??year(s)  ??? ? ? ?ADL Screening due??21??and every 1??year(s)  ??? ??Due?  ??? ? ? ?IPPE due??08/10/21??One-time only  ??? ? ? ?Medicare Annual Wellness Exam due??08/10/21??and every 1??year(s)  ??? ? ? ?Zoster Vaccine due??08/10/21??Unknown Frequency  ??? ??Due In Future?  ??? ? ? ?Blood Pressure Screening not due until??10/13/21??and every 1??year(s)  ??? ? ? ?Depression Screening not due until??21??and every 1??year(s)  ??? ? ? ?Obesity Screening not due until??22??and every 1??year(s)  ??? ? ? ?Fall Risk Assessment not due until??22??and every 1??year(s)  ??? ? ? ?Functional Assessment not due until??22??and every 1??year(s)  ??? ? ? ?Body Mass Index Check not due until??22??and every 1??year(s)  ???Satisfied?(in the past 1 year)  ???  ??Satisfied?  ??? ? ? ?Blood Pressure Screening on??08/10/21.??Satisfied by Deepali Soto  ??? ? ? ?Body Mass Index Check on??08/10/21.??Satisfied by Deepali Soto  ??? ? ? ?Depression Screening on??11/30/20.??Satisfied by Kolby Hess  ??? ? ? ?Diabetes Screening on??10/13/20.??Satisfied by Olivier Leach  ??? ? ? ?Fall Risk Assessment on??07/20/21.??Satisfied by RADHA Arceo, [place]  ??? ? ? ?Functional Assessment on??08/10/21.??Satisfied by Deepali Soto  ??? ? ? ?Influenza Vaccine on??11/25/20.??Satisfied by RADHA Arceo, [place]  ??? ? ? ?Obesity Screening on??08/10/21.??Satisfied by Deepali Soto  ??? ? ? ?Pneumococcal Vaccine on??10/13/20.??Satisfied by Deepali Soto  ??? ? ? ?Tetanus Vaccine on??11/24/20.??Satisfied by Geena Arceo RN  ??? ??Refused?  ??? ? ? ?Zoster Vaccine on??08/10/21.??Recorded by Deepali Soto  ?

## 2022-12-09 ENCOUNTER — DOCUMENTATION ONLY (OUTPATIENT)
Dept: INTERNAL MEDICINE | Facility: CLINIC | Age: 67
End: 2022-12-09

## 2023-03-10 ENCOUNTER — HOSPITAL ENCOUNTER (INPATIENT)
Facility: HOSPITAL | Age: 68
LOS: 6 days | Discharge: NURSING, REHAB OR LONG-TERM CARE | DRG: 057 | End: 2023-03-16
Attending: EMERGENCY MEDICINE | Admitting: INTERNAL MEDICINE
Payer: MEDICARE

## 2023-03-10 DIAGNOSIS — I63.9 ACUTE CVA (CEREBROVASCULAR ACCIDENT): ICD-10-CM

## 2023-03-10 DIAGNOSIS — M62.82 RHABDOMYOLYSIS: Primary | ICD-10-CM

## 2023-03-10 DIAGNOSIS — L89.90 PRESSURE INJURY OF SKIN, UNSPECIFIED INJURY STAGE, UNSPECIFIED LOCATION: ICD-10-CM

## 2023-03-10 DIAGNOSIS — T14.90XA TRAUMA: ICD-10-CM

## 2023-03-10 DIAGNOSIS — F03.90 DEMENTIA, UNSPECIFIED DEMENTIA SEVERITY, UNSPECIFIED DEMENTIA TYPE, UNSPECIFIED WHETHER BEHAVIORAL, PSYCHOTIC, OR MOOD DISTURBANCE OR ANXIETY: ICD-10-CM

## 2023-03-10 DIAGNOSIS — R52 PAIN: ICD-10-CM

## 2023-03-10 DIAGNOSIS — R29.6 UNWITNESSED FALL: ICD-10-CM

## 2023-03-10 DIAGNOSIS — R53.1 RIGHT SIDED WEAKNESS: ICD-10-CM

## 2023-03-10 LAB
ALBUMIN SERPL-MCNC: 3.4 G/DL (ref 3.4–4.8)
ALBUMIN/GLOB SERPL: 1.1 RATIO (ref 1.1–2)
ALP SERPL-CCNC: 85 UNIT/L (ref 40–150)
ALT SERPL-CCNC: 20 UNIT/L (ref 0–55)
AMPHET UR QL SCN: NEGATIVE
ANION GAP SERPL CALC-SCNC: 17 MMOL/L (ref 8–16)
APPEARANCE UR: CLEAR
APTT PPP: 23.7 SECONDS (ref 23.2–33.7)
AST SERPL-CCNC: 51 UNIT/L (ref 5–34)
AV INDEX (PROSTH): 0.76
AV MEAN GRADIENT: 3 MMHG
AV PEAK GRADIENT: 5 MMHG
AV VALVE AREA: 2.65 CM2
AV VELOCITY RATIO: 0.77
BACTERIA #/AREA URNS AUTO: ABNORMAL /HPF
BARBITURATE SCN PRESENT UR: NEGATIVE
BASOPHILS # BLD AUTO: 0.03 X10(3)/MCL (ref 0–0.2)
BASOPHILS NFR BLD AUTO: 0.2 %
BENZODIAZ UR QL SCN: NEGATIVE
BILIRUB UR QL STRIP.AUTO: NEGATIVE MG/DL
BILIRUBIN DIRECT+TOT PNL SERPL-MCNC: 1.1 MG/DL
BSA FOR ECHO PROCEDURE: 1.71 M2
BUN SERPL-MCNC: 16 MG/DL (ref 6–30)
BUN SERPL-MCNC: 16 MG/DL (ref 8.4–25.7)
CALCIUM SERPL-MCNC: 8.9 MG/DL (ref 8.8–10)
CANNABINOIDS UR QL SCN: NEGATIVE
CHLORIDE SERPL-SCNC: 100 MMOL/L (ref 95–110)
CHLORIDE SERPL-SCNC: 104 MMOL/L (ref 98–107)
CHOLEST SERPL-MCNC: 178 MG/DL
CHOLEST/HDLC SERPL: 4 {RATIO} (ref 0–5)
CK SERPL-CCNC: 3906 U/L (ref 30–200)
CO2 SERPL-SCNC: 23 MMOL/L (ref 23–31)
COCAINE UR QL SCN: NEGATIVE
COLOR UR AUTO: ABNORMAL
CREAT SERPL-MCNC: 1 MG/DL (ref 0.5–1.4)
CREAT SERPL-MCNC: 1.12 MG/DL (ref 0.73–1.18)
CV ECHO LV RWT: 0.38 CM
DOP CALC AO PEAK VEL: 1.1 M/S
DOP CALC AO VTI: 23.8 CM
DOP CALC LVOT AREA: 3.5 CM2
DOP CALC LVOT DIAMETER: 2.1 CM
DOP CALC LVOT PEAK VEL: 0.85 M/S
DOP CALC LVOT STROKE VOLUME: 63.01 CM3
DOP CALC MV VTI: 17.5 CM
DOP CALCLVOT PEAK VEL VTI: 18.2 CM
E/A RATIO: 1.04
E/E' RATIO: 5.04 M/S
ECHO LV POSTERIOR WALL: 0.73 CM (ref 0.6–1.1)
EJECTION FRACTION: 60 %
EOSINOPHIL # BLD AUTO: 0 X10(3)/MCL (ref 0–0.9)
EOSINOPHIL NFR BLD AUTO: 0 %
ERYTHROCYTE [DISTWIDTH] IN BLOOD BY AUTOMATED COUNT: 13.3 % (ref 11.5–17)
FENTANYL UR QL SCN: NEGATIVE
FRACTIONAL SHORTENING: 31 % (ref 28–44)
GFR SERPLBLD CREATININE-BSD FMLA CKD-EPI: >60 MLS/MIN/1.73/M2
GLOBULIN SER-MCNC: 3.2 GM/DL (ref 2.4–3.5)
GLUCOSE SERPL-MCNC: 120 MG/DL (ref 82–115)
GLUCOSE SERPL-MCNC: 124 MG/DL (ref 70–110)
GLUCOSE UR QL STRIP.AUTO: NEGATIVE MG/DL
HCT VFR BLD AUTO: 39.6 % (ref 42–52)
HCT VFR BLD CALC: 42 %PCV (ref 36–54)
HDLC SERPL-MCNC: 48 MG/DL (ref 35–60)
HGB BLD-MCNC: 13.9 G/DL (ref 14–18)
HGB BLD-MCNC: 14 G/DL
IMM GRANULOCYTES # BLD AUTO: 0.09 X10(3)/MCL (ref 0–0.04)
IMM GRANULOCYTES NFR BLD AUTO: 0.7 %
INR BLD: 1.06 (ref 0–1.3)
INTERVENTRICULAR SEPTUM: 0.83 CM (ref 0.6–1.1)
KETONES UR QL STRIP.AUTO: NEGATIVE MG/DL
LDLC SERPL CALC-MCNC: 117 MG/DL (ref 50–140)
LEFT ATRIUM SIZE: 2.6 CM
LEFT INTERNAL DIMENSION IN SYSTOLE: 2.63 CM (ref 2.1–4)
LEFT VENTRICLE DIASTOLIC VOLUME INDEX: 36.26 ML/M2
LEFT VENTRICLE DIASTOLIC VOLUME: 62 ML
LEFT VENTRICLE MASS INDEX: 49 G/M2
LEFT VENTRICLE SYSTOLIC VOLUME INDEX: 14.8 ML/M2
LEFT VENTRICLE SYSTOLIC VOLUME: 25.3 ML
LEFT VENTRICULAR INTERNAL DIMENSION IN DIASTOLE: 3.8 CM (ref 3.5–6)
LEFT VENTRICULAR MASS: 83.07 G
LEUKOCYTE ESTERASE UR QL STRIP.AUTO: NEGATIVE UNIT/L
LV LATERAL E/E' RATIO: 4.14 M/S
LV SEPTAL E/E' RATIO: 6.44 M/S
LVOT MG: 2 MMHG
LVOT MV: 0.61 CM/S
LYMPHOCYTES # BLD AUTO: 0.54 X10(3)/MCL (ref 0.6–4.6)
LYMPHOCYTES NFR BLD AUTO: 4.3 %
MAGNESIUM SERPL-MCNC: 2 MG/DL (ref 1.6–2.6)
MCH RBC QN AUTO: 33 PG
MCHC RBC AUTO-ENTMCNC: 35.1 G/DL (ref 33–36)
MCV RBC AUTO: 94.1 FL (ref 80–94)
MDMA UR QL SCN: NEGATIVE
MONOCYTES # BLD AUTO: 0.66 X10(3)/MCL (ref 0.1–1.3)
MONOCYTES NFR BLD AUTO: 5.2 %
MV MEAN GRADIENT: 1 MMHG
MV PEAK A VEL: 0.56 M/S
MV PEAK E VEL: 0.58 M/S
MV PEAK GRADIENT: 2 MMHG
MV VALVE AREA BY CONTINUITY EQUATION: 3.6 CM2
NEUTROPHILS # BLD AUTO: 11.26 X10(3)/MCL (ref 2.1–9.2)
NEUTROPHILS NFR BLD AUTO: 89.6 %
NITRITE UR QL STRIP.AUTO: NEGATIVE
NRBC BLD AUTO-RTO: 0 %
OPIATES UR QL SCN: NEGATIVE
PCP UR QL: NEGATIVE
PH UR STRIP.AUTO: 5.5 [PH]
PH UR: 5.5 [PH] (ref 3–11)
PHOSPHATE SERPL-MCNC: 2.9 MG/DL (ref 2.3–4.7)
PLATELET # BLD AUTO: 293 X10(3)/MCL (ref 130–400)
PMV BLD AUTO: 9.7 FL (ref 7.4–10.4)
POC IONIZED CALCIUM: 1.19 MMOL/L (ref 1.06–1.42)
POC TCO2 (MEASURED): 27 MMOL/L (ref 23–29)
POTASSIUM BLD-SCNC: 3.8 MMOL/L (ref 3.5–5.1)
POTASSIUM SERPL-SCNC: 3.8 MMOL/L (ref 3.5–5.1)
PROT SERPL-MCNC: 6.6 GM/DL (ref 5.8–7.6)
PROT UR QL STRIP.AUTO: ABNORMAL MG/DL
PROTHROMBIN TIME: 13.7 SECONDS (ref 12.5–14.5)
RA PRESSURE: 8 MMHG
RBC # BLD AUTO: 4.21 X10(6)/MCL (ref 4.7–6.1)
RBC #/AREA URNS AUTO: 12 /HPF
RBC UR QL AUTO: ABNORMAL UNIT/L
RIGHT VENTRICULAR END-DIASTOLIC DIMENSION: 2.49 CM
SAMPLE: ABNORMAL
SODIUM BLD-SCNC: 139 MMOL/L (ref 136–145)
SODIUM SERPL-SCNC: 138 MMOL/L (ref 136–145)
SP GR UR STRIP.AUTO: >=1.04 (ref 1–1.03)
SQUAMOUS #/AREA URNS AUTO: <5 /HPF
TDI LATERAL: 0.14 M/S
TDI SEPTAL: 0.09 M/S
TDI: 0.12 M/S
TRICUSPID ANNULAR PLANE SYSTOLIC EXCURSION: 1.96 CM
TRIGL SERPL-MCNC: 65 MG/DL (ref 34–140)
TROPONIN I SERPL-MCNC: <0.01 NG/ML (ref 0–0.04)
TSH SERPL-ACNC: 1.94 UIU/ML (ref 0.35–4.94)
UROBILINOGEN UR STRIP-ACNC: 1 MG/DL
VLDLC SERPL CALC-MCNC: 13 MG/DL
WBC # SPEC AUTO: 12.6 X10(3)/MCL (ref 4.5–11.5)
WBC #/AREA URNS AUTO: <5 /HPF

## 2023-03-10 PROCEDURE — 11000001 HC ACUTE MED/SURG PRIVATE ROOM

## 2023-03-10 PROCEDURE — 82550 ASSAY OF CK (CPK): CPT | Performed by: EMERGENCY MEDICINE

## 2023-03-10 PROCEDURE — 84484 ASSAY OF TROPONIN QUANT: CPT | Performed by: EMERGENCY MEDICINE

## 2023-03-10 PROCEDURE — 81001 URINALYSIS AUTO W/SCOPE: CPT | Performed by: EMERGENCY MEDICINE

## 2023-03-10 PROCEDURE — 90471 IMMUNIZATION ADMIN: CPT | Performed by: EMERGENCY MEDICINE

## 2023-03-10 PROCEDURE — 63600175 PHARM REV CODE 636 W HCPCS: Performed by: EMERGENCY MEDICINE

## 2023-03-10 PROCEDURE — 25000003 PHARM REV CODE 250: Performed by: NURSE PRACTITIONER

## 2023-03-10 PROCEDURE — 25500020 PHARM REV CODE 255: Performed by: EMERGENCY MEDICINE

## 2023-03-10 PROCEDURE — 85730 THROMBOPLASTIN TIME PARTIAL: CPT | Performed by: EMERGENCY MEDICINE

## 2023-03-10 PROCEDURE — 25000003 PHARM REV CODE 250: Performed by: EMERGENCY MEDICINE

## 2023-03-10 PROCEDURE — 80053 COMPREHEN METABOLIC PANEL: CPT | Performed by: EMERGENCY MEDICINE

## 2023-03-10 PROCEDURE — 86301 IMMUNOASSAY TUMOR CA 19-9: CPT | Performed by: INTERNAL MEDICINE

## 2023-03-10 PROCEDURE — 84443 ASSAY THYROID STIM HORMONE: CPT | Performed by: NURSE PRACTITIONER

## 2023-03-10 PROCEDURE — 99285 EMERGENCY DEPT VISIT HI MDM: CPT | Mod: 25

## 2023-03-10 PROCEDURE — 80307 DRUG TEST PRSMV CHEM ANLYZR: CPT | Performed by: INTERNAL MEDICINE

## 2023-03-10 PROCEDURE — 83735 ASSAY OF MAGNESIUM: CPT | Performed by: INTERNAL MEDICINE

## 2023-03-10 PROCEDURE — 85610 PROTHROMBIN TIME: CPT | Performed by: EMERGENCY MEDICINE

## 2023-03-10 PROCEDURE — 85025 COMPLETE CBC W/AUTO DIFF WBC: CPT | Performed by: EMERGENCY MEDICINE

## 2023-03-10 PROCEDURE — 21400001 HC TELEMETRY ROOM

## 2023-03-10 PROCEDURE — 82105 ALPHA-FETOPROTEIN SERUM: CPT | Performed by: INTERNAL MEDICINE

## 2023-03-10 PROCEDURE — 84100 ASSAY OF PHOSPHORUS: CPT | Performed by: INTERNAL MEDICINE

## 2023-03-10 PROCEDURE — G0390 TRAUMA RESPONS W/HOSP CRITI: HCPCS

## 2023-03-10 PROCEDURE — 90715 TDAP VACCINE 7 YRS/> IM: CPT | Performed by: EMERGENCY MEDICINE

## 2023-03-10 PROCEDURE — 80061 LIPID PANEL: CPT | Performed by: NURSE PRACTITIONER

## 2023-03-10 RX ORDER — LABETALOL HYDROCHLORIDE 5 MG/ML
10 INJECTION, SOLUTION INTRAVENOUS EVERY 6 HOURS PRN
Status: DISCONTINUED | OUTPATIENT
Start: 2023-03-10 | End: 2023-03-16 | Stop reason: HOSPADM

## 2023-03-10 RX ORDER — SODIUM CHLORIDE 9 MG/ML
INJECTION, SOLUTION INTRAVENOUS CONTINUOUS
Status: DISCONTINUED | OUTPATIENT
Start: 2023-03-10 | End: 2023-03-11

## 2023-03-10 RX ADMIN — TETANUS TOXOID, REDUCED DIPHTHERIA TOXOID AND ACELLULAR PERTUSSIS VACCINE, ADSORBED 0.5 ML: 5; 2.5; 8; 8; 2.5 SUSPENSION INTRAMUSCULAR at 12:03

## 2023-03-10 RX ADMIN — SODIUM CHLORIDE 1000 ML: 9 INJECTION, SOLUTION INTRAVENOUS at 12:03

## 2023-03-10 RX ADMIN — SODIUM CHLORIDE: 9 INJECTION, SOLUTION INTRAVENOUS at 04:03

## 2023-03-10 RX ADMIN — IOPAMIDOL 100 ML: 755 INJECTION, SOLUTION INTRAVENOUS at 01:03

## 2023-03-10 NOTE — ED PROVIDER NOTES
Encounter Date: 3/10/2023    SCRIBE #1 NOTE: I, Nury Carrion, am scribing for, and in the presence of,  Orly Ascencio MD. I have scribed the following portions of the note - Other sections scribed: HPI, ROS, PE.     History     Chief Complaint   Patient presents with    Trauma     EMS reports R arm and R leg weakness, last seen normal 1830 on 3/9/23. EMS reports a fall at some time last night, denies thinner, R periorbital swelling noted. GCS 14.      66 year old male with a hx of glaucoma, dementia presents to the ED via EMS as a level 2 trauma following a fall last night. Per the patient's brother-in-law (one of our ED nurses) and EMS, the patient was last seen normal last night around [id number]. The patient believes he fell around midnight and was unable to get off the floor until his brother-in-law found him today just prior to his arrival. He is complaining of right forearm pain, right sided weakness, right sided facial swelling, and increased confusion compared to his baseline. The brother-in-law reports that he lives alone, but has family check on him twice a day. His dementia began worsening over the last 1-2 years and he last had a fall several months ago without injuring himself. He is not on a blood thinner.     The history is provided by the patient, a relative and the EMS personnel. The history is limited by the condition of the patient. No  was used.   Fall  The accident occurred several hours ago. The fall occurred in unknown circumstances. He fell from an unknown height. There was no blood loss. The point of impact was the head, face, right shoulder and right hip. The pain is at a severity of 0/10. He was Not ambulatory at the scene. Pertinent negatives include no neck pain, no fever, no abdominal pain, no nausea, no vomiting and no headaches. He has tried nothing for the symptoms.   Review of patient's allergies indicates:  Not on File  No past medical history on file.  No past  surgical history on file.  No family history on file.     Review of Systems   Constitutional:  Negative for chills and fever.   HENT:  Positive for facial swelling.    Respiratory:  Negative for cough, chest tightness and shortness of breath.    Cardiovascular:  Negative for chest pain.   Gastrointestinal:  Negative for abdominal pain, nausea and vomiting.   Musculoskeletal:  Negative for myalgias and neck pain.        Right forearm pain   Skin:  Positive for wound.   Neurological:  Positive for weakness (right sided). Negative for syncope and headaches.   Psychiatric/Behavioral:  Positive for confusion.    All other systems reviewed and are negative.    Physical Exam     Initial Vitals [03/10/23 1225]   BP Pulse Resp Temp SpO2   128/79 89 17 98.1 °F (36.7 °C) 99 %      MAP       --         Physical Exam    Nursing note and vitals reviewed.  Constitutional: Airway: Normal. Breathing: Normal. Circulation: Normal. He is not diaphoretic. Pulses:Radial palpable. No distress.   HENT:   Head: Normocephalic.   Superficial abrasion to the right forehead, no laceration    Eyes: Pupils: Normal pupils. EOM are normal. Pupils are equal, round, and reactive to light.   Right periorbital edema, no hyphema, no lacerations   Neck: Neck supple.   Chronic kyphosis, no midline or paraspinal tenderness to palpation, no stepoff deformity   Normal range of motion.  Cardiovascular:  Normal rate, regular rhythm and intact distal pulses.           Pulses:       Radial pulses are 2+ on the right side and 2+ on the left side.        Dorsalis pedis pulses are 2+ on the right side and 2+ on the left side.   Pulmonary/Chest: Breath sounds normal. No respiratory distress.   Chest wall stable    Abdominal: Abdomen is soft. He exhibits no distension. There is no abdominal tenderness. The pelvis is stable.   Musculoskeletal:         General: Edema (right face, right hand, right lower leg) present. Normal range of motion.      Right upper arm: Edema  (dependent) present.      Right forearm: Edema (dependent) present.      Cervical back: Normal range of motion and neck supple. No deformity or bony tenderness. Normal.      Thoracic back: Normal. No deformity or bony tenderness.      Lumbar back: Normal. No deformity or bony tenderness.      Comments: Right elbow and forearm contusion      Neurological: He is alert. GCS eye subscore is 4. GCS verbal subscore is 5. GCS motor subscore is 6.   GCS 14, right arm drift (does not hit bed), able to lift right and left leg off the bed for 5 seconds (more effort required for R leg)   Skin: Skin is warm. No rash noted.   Pressure erythema to the right ear, right lateral ankle, abrasion / superficial skin tear to the right shoulder, pressure erythema to the right shoulder       ED Course   Procedures  Labs Reviewed   COMPREHENSIVE METABOLIC PANEL - Abnormal; Notable for the following components:       Result Value    Glucose Level 120 (*)     Aspartate Aminotransferase 51 (*)     All other components within normal limits   CK - Abnormal; Notable for the following components:    Creatine Kinase 3,906 (*)     All other components within normal limits   CBC WITH DIFFERENTIAL - Abnormal; Notable for the following components:    WBC 12.6 (*)     RBC 4.21 (*)     Hgb 13.9 (*)     Hct 39.6 (*)     MCV 94.1 (*)     Lymph # 0.54 (*)     Neut # 11.26 (*)     IG# 0.09 (*)     All other components within normal limits   PROTIME-INR - Normal   APTT - Normal   TROPONIN I - Normal   CBC W/ AUTO DIFFERENTIAL    Narrative:     The following orders were created for panel order CBC auto differential.  Procedure                               Abnormality         Status                     ---------                               -----------         ------                     CBC with Differential[[id number]]        Abnormal            Final result                 Please view results for these tests on the individual orders.   LIPID PANEL    URINALYSIS, REFLEX TO URINE CULTURE   TSH   ISTAT CHEM8     EKG Readings: (Independently Interpreted)   Initial Reading: No STEMI. Rhythm: Normal Sinus Rhythm. Heart Rate: 77. Conduction: LAFB. ST Segments: Normal ST Segments. T Waves: Normal. Axis: Normal. Clinical Impression: Normal Sinus Rhythm   03/10/2023@ 1330     Imaging Results              CTA Head and Neck (xpd) (Final result)  Result time 03/10/23 13:33:04      Final result by Los Crenshaw MD (03/10/23 13:33:04)                   Impression:      No hemodynamically significant flow-limiting stenosis identified.      Electronically signed by: Los Crenshaw  Date:    03/10/2023  Time:    13:33               Narrative:    EXAMINATION:  CTA HEAD AND NECK (XPD)    CLINICAL HISTORY:  AMS.    TECHNIQUE:  Brain and neck imaging was performed from skull base to vertex prior to intravenous contrast. CT Angiogram of head and neck was subsequently performed following intravenous contrast administration.  Coronal and sagittal MPR reconstructions were performed in addition to coronal and sagittal MIP reconstructions.    Dose length product was 1272 mGycm. Automated exposure control was utilized to minimize radiation dose.    COMPARISON:  CT brain without contrast same date    FINDINGS:  Carotid arteries are assessed in accordance with the NASCET criteria.    The unenhanced portion of the study visualized thoracic aorta is without aneurysmal dilatation or dissection.  There is no significant stenosis of the brachiocephalic trunk and the visualized portion of the subclavian arteries.  There is unremarkable flow bilaterally within the common carotid arteries, internal and external carotid arteries without focal stenosis, dissection or intraluminal thrombus.  Cavernous and supraclinoid portion of internal carotid arteries are again patent.  There is also unremarkable flow bilaterally within the middle cerebral arteries, anterior cerebral artery and the major  branches.    There is unremarkable flow within bilateral vertebral arteries.  The basilar artery is also patent.  There is also patency of bilateral posterior cerebral arteries.    Visualized portion lungs are clear.  Unremarkable enhancement thyroid glands.                                       CT Maxillofacial Without Contrast (Final result)  Result time 03/10/23 13:24:22      Final result by Los Crenshaw MD (03/10/23 13:24:22)                   Impression:      No acute maxillofacial fracture identified.      Electronically signed by: Los Crenshaw  Date:    03/10/2023  Time:    13:24               Narrative:    EXAMINATION:  CT MAXILLOFACIAL WITHOUT CONTRAST    CLINICAL HISTORY:  Facial trauma, blunt;    TECHNIQUE:  Multidetector axial images were performed maxillofacial without contrast and images reformatted.    Dose length product of 1418 mGycm. Automated exposure control was utilized to minimize radiation dose.    COMPARISON:  None available    FINDINGS:  There is right supraorbital soft tissue inflammation.  There are no fractures of the orbital walls. The globes are unremarkable and no intra-orbital inflammations or emphysema identified. There are no fractures of the nasal bones, pterygoids, zygomatic arches, paranasal sinuses walls or the mandibles.                                       CT Cervical Spine Without Contrast (Final result)  Result time 03/10/23 13:21:13      Final result by Los Crenshaw MD (03/10/23 13:21:13)                   Impression:      No acute fracture or malalignment identified.      Electronically signed by: Los Crenshaw  Date:    03/10/2023  Time:    13:21               Narrative:    EXAMINATION:  CT CERVICAL SPINE WITHOUT CONTRAST    CLINICAL HISTORY:  Trauma.    TECHNIQUE:  Multidetector axial images were performed of the cervical spine without and.  Images were reconstructed.    Automated exposure control was utilized to minimize radiation dose.  DLP  1415.    COMPARISON:  None available.    FINDINGS:  Cervical vertebrae stature is maintained and alignment is unremarkable.  No acute fracture or malalignment identified.  There are degenerative changes which cause ventral impression upon the thecal sac and narrowings of the neural foramen.  There is no prevertebral soft tissue prominence.    This study does not exclude the possibility of intrathecal soft tissue, ligamentous or vascular injury.                                       CT Head Without Contrast (Final result)  Result time 03/10/23 13:18:15      Final result by Nona Fernández MD (03/10/23 13:18:15)                   Impression:      Chronic age-related changes.  No acute process      Electronically signed by: Nona Fernández  Date:    03/10/2023  Time:    13:18               Narrative:    EXAMINATION:  CT HEAD WITHOUT CONTRAST    CLINICAL HISTORY:  Neuro deficit, acute, stroke suspected;right hemiparesis, closed head injury;    TECHNIQUE:  Multiple axial images were obtained from the base of the brain to the vertex without contrast administration.  Sagittal and coronal reconstructions were performed..Automatic exposure control is utilized to reduce patient radiation exposure.    COMPARISON:  None    FINDINGS:  There is no intracranial mass or lesion seen.  No hemorrhage is seen.  No acute infarct is seen.  There is some cerebral atrophy seen.  There is some compensatory ventricular dilatation and periventricular white matter changes consistent with the patient's age.  Calvarium is intact.  The posterior fossa is unremarkable..  The visualized portions of the paranasal sinuses show no acute abnormality.                                       X-Ray Forearm Right (Final result)  Result time 03/10/23 14:03:47      Final result by Conor Carcamo MD (03/10/23 14:03:47)                   Impression:      No acute fractures or subluxations are identified.      Electronically signed by: Conor  Carlos Alberto  Date:    03/10/2023  Time:    14:03               Narrative:    EXAMINATION:  XR FOREARM RIGHT    CLINICAL HISTORY:  , Injury, unspecified, initial encounter.    FINDINGS:  No acute fractures or subluxations are identified.   No radiopaque foreign body is present.                                       X-ray Shoulder 2 or More Views Right (Final result)  Result time 03/10/23 14:12:47      Final result by Agatha Buenrostro MD (03/10/23 14:12:47)                   Impression:      No acute bony abnormality.      Electronically signed by: Agatha Buenrostro  Date:    03/10/2023  Time:    14:12               Narrative:    EXAMINATION:  XR SHOULDER COMPLETE 2 OR MORE VIEWS RIGHT    CLINICAL HISTORY:  fall onto right side, shoulder pain;    COMPARISON:  None.    FINDINGS:  There is no acute fracture or malalignment.  The soft tissues are unremarkable.                                       X-Ray Pelvis Routine AP (Final result)  Result time 03/10/23 13:42:46      Final result by Primitivo Hernández MD (03/10/23 13:42:46)                   Impression:      No acute findings.      Electronically signed by: Primitivo Hernández  Date:    03/10/2023  Time:    13:42               Narrative:    EXAMINATION:  XR PELVIS ROUTINE AP    CLINICAL HISTORY:  trauma;    COMPARISON:  None    FINDINGS:  Frontal image of the pelvis.  There is no fracture or dislocation.  Degenerative changes of the hips without significant joint space narrowing.                                       X-Ray Chest 1 View (Final result)  Result time 03/10/23 13:16:20      Final result by Agatah Buenrostro MD (03/10/23 13:16:20)                   Impression:      No acute abnormality of the chest.      Electronically signed by: Agatha Buenrostro  Date:    03/10/2023  Time:    13:16               Narrative:    EXAMINATION:  XR CHEST 1 VIEW    CLINICAL HISTORY:  Injury, unspecified, initial encounter    TECHNIQUE:  AP chest    COMPARISON:  None.    FINDINGS:  The  heart is normal in size.  There is no focal airspace consolidation.  There is no pleural effusion or definite visible pneumothorax.                                    Medical Decision Making  Problems Addressed:  Dementia, unspecified dementia severity, unspecified dementia type, unspecified whether behavioral, psychotic, or mood disturbance or anxiety: chronic illness or injury  Pressure injury of skin, unspecified injury stage, unspecified location: acute illness or injury  Rhabdomyolysis: acute illness or injury  Right sided weakness: acute illness or injury  Unwitnessed fall: acute illness or injury      ED assessment:    Mr. Farooq was brought in for evaluation following unwitnessed fall sometime overnight, found on the ground lying on his right side this AM. Unknown circumstances of fall. Patient denies any pain at this time. Brother in law is one of our nurses in the ED - reports cognitive and physical decline over the past 1-2 years but significantly weaker today than baseline, particularly on the right side. Activated as a trauma given found down and altered. Consideration to possible CVA as well but given last normal last night, out of window for consideration of thrombolysis. NIHSS 6, will get CT angio w/ trauma work up to eval for potential LVO though weakness on the right side may just be due to compressive neuropathy from lying on that side for hours.     Differential diagnosis (including but not limited to):   Ischemic vs hemorrhagic CVA, closed head injury, facial contusion, facial fracture, ARIA, dehydration, rhabdomyolysis, electrolyte derangements, hip fracture, pelvic fracture, forearm contusion, forearm fracture, shoulder contusion, shoulder fracture, occult infection (pneumonia, UTI), dependent edema    ED management:   Trauma imaging w/o indication of clinically significant injuries sustained. On repeat examination, facial swelling more likely dependent edema from lying on the floor for prolonged  period as no contusion noted. Labs w/ elevated CPK but currently preserved renal function. Started on IV fluids. CT head/CTA w/o ICH or evident LVO. Will admit for further inpt evaluation and management. Plan MRI to eval for potential CVA - If no acute stroke, may benefit from PT/OT evaluation and consideration of additional home resources vs residential placement.     My independent radiology interpretation:   CXR: no displaced rib fracture, no pneumothorax, no focal infiltrate or effusion  XR pelvis: no acute fracture or subluxation  XR R forearm: no acute fracture or subluxation  XR R shoulder: no acute fracture or subluxation      Amount and/or Complexity of Data Reviewed  Independent historian: EMS, brother in law, sister   Summary of history: Entirety of history as above, patient unable to contribute to history at this time.   External data reviewed: no prior records available for review   Summary of data reviewed:   Risk and benefits of testing: discussed   Labs: ordered and reviewed  Radiology: ordered and independent interpretation performed (see above or ED course)  ECG/medicine tests: ordered and independent interpretation performed (see above or ED course)  Discussion of management or test interpretation with external provider(s): discussed with hospitalist physician   Summary of discussion: Case discussed with CARMEN Claudio for the hospitalist service who accepts admission on behalf of Dr. Barger.     Risk  Decision regarding hospitalization  Shared decision making     Critical Care  none    I, Orly Ascencio MD personally performed the history, PE, MDM, and procedures as documented above and agree with the scribe's documentation.        Medications   labetaloL injection 10 mg (has no administration in time range)   Tdap (BOOSTRIX) vaccine injection 0.5 mL (0.5 mLs Intramuscular Given 3/10/23 1231)   sodium chloride 0.9% bolus 1,000 mL 1,000 mL (0 mLs Intravenous Stopped 3/10/23 1327)   iopamidoL  (ISOVUE-370) injection 100 mL (100 mLs Intravenous Given 3/10/23 1325)              Scribe Attestation:   Scribe #1: I performed the above scribed service and the documentation accurately describes the services I performed. I attest to the accuracy of the note.    Attending Attestation:           Physician Attestation for Scribe:  Physician Attestation Statement for Scribe #1: I, Orly Ascencio MD, reviewed documentation, as scribed by Nury Carrion in my presence, and it is both accurate and complete.           ED Course as of 03/10/23 1515   Fri Mar 10, 2023   1307 CPK(!): 3,906  Noted elevated CK, presently with preserved renal function.  Will continue IV fluids and plan for admission.  CT reports pending at this time. [KS]      ED Course User Index  [KS] Orly Ascencio MD                 Clinical Impression:   Final diagnoses:  [R29.6] Unwitnessed fall  [T14.90XA] Trauma  [M62.82] Rhabdomyolysis (Primary)  [L89.90] Pressure injury of skin, unspecified injury stage, unspecified location  [R53.1] Right sided weakness  [F03.90] Dementia, unspecified dementia severity, unspecified dementia type, unspecified whether behavioral, psychotic, or mood disturbance or anxiety        ED Disposition Condition    Admit                 Orly Ascencio MD  03/11/23 0415

## 2023-03-10 NOTE — Clinical Note
Diagnosis: Rhabdomyolysis [728.88.ICD-9-CM]   Admitting Provider:: BOB SOMERS [[id number]]   Future Attending Provider: BOB SOMERS [[id number]]   Reason for IP Medical Treatment  (Clinical interventions that can only be accomplished in the IP setting? ) :: IV fluids, stroke work up   I certify that Inpatient services for greater than or equal to 2 midnights are medically necessary:: Yes   Plans for Post-Acute care--if anticipated (pick the single best option):: A. No post acute care anticipated at this time

## 2023-03-10 NOTE — H&P
[facility]  Hospital Medicine History & Physical Examination       Patient Name: Shauna Walters  MRN: [id number]  Patient Class: Emergency   Admission Date: 03/10/2023   Admitting Service: Hospital Medicine   Length of Stay: 0  Attending Physician: Dr. Barger  Primary Care Provider: No primary care provider on file.  Face-to-Face encounter date: 03/10/2023  Code Status: Not addressed  Chief Complaint: No chief complaint on file.    Present at Bedside: Sister and brother-in-law  Source of Information: Medical Records      HISTORY OF PRESENT ILLNESS:   Shauna Walters is a 66 y.o. male with a PMHx of glaucoma who presented to [facility] via EMS as a level 2 trauma on 3/10/2023 following an unwitnessed fall on 03/09/2023.  Patient was last seen normal between 5-6 p.m. on 03/09/2023.  He was found on the floor by his brother-in-law on 03/10/2023 laying on his right side, and was noted to have right-sided facial swelling, right-sided weakness and increased confusion. Family member reports that he was lost to follow-up at [facility], and had a CT Head sometime back in 2021 that he was not ever told the results of. Family also states that he takes a while to respond after waking in the morning, and ambulates with shuffling gait from time to time.     ED vital signs were stable.  Labs were notable for WBC 12.6, hemoglobin 13.9, hematocrit 39.6, glucose 120, CPK 3906.  Troponin undetectable.  Imaging negative for acute traumatic injuries.  CT head negative for acute intracranial abnormalities, chronic age-related changes noted.  He was admitted to hospital medicine services for further workup and management of care.    REVIEW OF SYSTEMS:   Unobtainable    PAST MEDICAL HISTORY:   Glaucoma    PAST SURGICAL HISTORY:   Multiple abdominal surgeries, cholecystectomy, foot surgery    FAMILY HISTORY:   Reviewed and negative    SOCIAL HISTORY:   Denied alcohol, tobacco or illicit drug  use    ALLERGIES:   Patient has no allergy information on record.    HOME MEDICATIONS:     Prior to Admission medications    Not on File     ________________________________________________________________________  INPATIENT LIST OF MEDICATIONS   No current facility-administered medications for this encounter.  No current outpatient medications on file.    Scheduled Meds:  Continuous Infusions:  PRN Meds:.    PHYSICAL EXAM:     VITAL SIGNS: 24 HRS MIN & MAX LAST   Temp  Min: 97.9 °F (36.6 °C)  Max: 98.1 °F (36.7 °C) 97.9 °F (36.6 °C)   BP  Min: 125/77  Max: 149/85 (!) 149/85     Pulse  Min: 78  Max: 89  78   Resp  Min: 16  Max: 19 18   SpO2  Min: 99 %  Max: 100 % 100 %       PHYSICAL EXAM NOT DONE, PT IN MRI    LABS AND IMAGING:     Recent Labs   Lab 03/10/23  1239   WBC 12.6*   RBC 4.21*   HGB 13.9*   HCT 39.6*   MCV 94.1*   MCH 33.0   MCHC 35.1   RDW 13.3      MPV 9.7       Recent Labs   Lab 03/10/23  1239      K 3.8   CO2 23   BUN 16.0   CREATININE 1.12   CALCIUM 8.9   ALBUMIN 3.4   ALKPHOS 85   ALT 20   AST 51*   BILITOT 1.1       Microbiology Results (last 7 days)       ** No results found for the last 168 hours. **             X-ray Shoulder 2 or More Views Right  Narrative: EXAMINATION:  XR SHOULDER COMPLETE 2 OR MORE VIEWS RIGHT    CLINICAL HISTORY:  fall onto right side, shoulder pain;    COMPARISON:  None.    FINDINGS:  There is no acute fracture or malalignment.  The soft tissues are unremarkable.  Impression: No acute bony abnormality.    Electronically signed by: Agatha Buenrostro  Date:    03/10/2023  Time:    14:12  X-Ray Forearm Right  Narrative: EXAMINATION:  XR FOREARM RIGHT    CLINICAL HISTORY:  , Injury, unspecified, initial encounter.    FINDINGS:  No acute fractures or subluxations are identified.   No radiopaque foreign body is present.  Impression: No acute fractures or subluxations are identified.    Electronically signed by: Conor  Carlos Alberto  Date:    03/10/2023  Time:    14:03  X-Ray Pelvis Routine AP  Narrative: EXAMINATION:  XR PELVIS ROUTINE AP    CLINICAL HISTORY:  trauma;    COMPARISON:  None    FINDINGS:  Frontal image of the pelvis.  There is no fracture or dislocation.  Degenerative changes of the hips without significant joint space narrowing.  Impression: No acute findings.    Electronically signed by: Primitivo Hernández  Date:    03/10/2023  Time:    13:42  CTA Head and Neck (xpd)  Narrative: EXAMINATION:  CTA HEAD AND NECK (XPD)    CLINICAL HISTORY:  AMS.    TECHNIQUE:  Brain and neck imaging was performed from skull base to vertex prior to intravenous contrast. CT Angiogram of head and neck was subsequently performed following intravenous contrast administration.  Coronal and sagittal MPR reconstructions were performed in addition to coronal and sagittal MIP reconstructions.    Dose length product was 1272 mGycm. Automated exposure control was utilized to minimize radiation dose.    COMPARISON:  CT brain without contrast same date    FINDINGS:  Carotid arteries are assessed in accordance with the NASCET criteria.    The unenhanced portion of the study visualized thoracic aorta is without aneurysmal dilatation or dissection.  There is no significant stenosis of the brachiocephalic trunk and the visualized portion of the subclavian arteries.  There is unremarkable flow bilaterally within the common carotid arteries, internal and external carotid arteries without focal stenosis, dissection or intraluminal thrombus.  Cavernous and supraclinoid portion of internal carotid arteries are again patent.  There is also unremarkable flow bilaterally within the middle cerebral arteries, anterior cerebral artery and the major branches.    There is unremarkable flow within bilateral vertebral arteries.  The basilar artery is also patent.  There is also patency of bilateral posterior cerebral arteries.    Visualized portion lungs are clear.   Unremarkable enhancement thyroid glands.  Impression: No hemodynamically significant flow-limiting stenosis identified.    Electronically signed by: Los Crenshaw  Date:    03/10/2023  Time:    13:33  CT Maxillofacial Without Contrast  Narrative: EXAMINATION:  CT MAXILLOFACIAL WITHOUT CONTRAST    CLINICAL HISTORY:  Facial trauma, blunt;    TECHNIQUE:  Multidetector axial images were performed maxillofacial without contrast and images reformatted.    Dose length product of 1418 mGycm. Automated exposure control was utilized to minimize radiation dose.    COMPARISON:  None available    FINDINGS:  There is right supraorbital soft tissue inflammation.  There are no fractures of the orbital walls. The globes are unremarkable and no intra-orbital inflammations or emphysema identified. There are no fractures of the nasal bones, pterygoids, zygomatic arches, paranasal sinuses walls or the mandibles.  Impression: No acute maxillofacial fracture identified.    Electronically signed by: Los Crenshaw  Date:    03/10/2023  Time:    13:24  CT Cervical Spine Without Contrast  Narrative: EXAMINATION:  CT CERVICAL SPINE WITHOUT CONTRAST    CLINICAL HISTORY:  Trauma.    TECHNIQUE:  Multidetector axial images were performed of the cervical spine without and.  Images were reconstructed.    Automated exposure control was utilized to minimize radiation dose.  DLP 1415.    COMPARISON:  None available.    FINDINGS:  Cervical vertebrae stature is maintained and alignment is unremarkable.  No acute fracture or malalignment identified.  There are degenerative changes which cause ventral impression upon the thecal sac and narrowings of the neural foramen.  There is no prevertebral soft tissue prominence.    This study does not exclude the possibility of intrathecal soft tissue, ligamentous or vascular injury.  Impression: No acute fracture or malalignment identified.    Electronically signed by: Los  Crenshaw  Date:    03/10/2023  Time:    13:21  CT Head Without Contrast  Narrative: EXAMINATION:  CT HEAD WITHOUT CONTRAST    CLINICAL HISTORY:  Neuro deficit, acute, stroke suspected;right hemiparesis, closed head injury;    TECHNIQUE:  Multiple axial images were obtained from the base of the brain to the vertex without contrast administration.  Sagittal and coronal reconstructions were performed..Automatic exposure control is utilized to reduce patient radiation exposure.    COMPARISON:  None    FINDINGS:  There is no intracranial mass or lesion seen.  No hemorrhage is seen.  No acute infarct is seen.  There is some cerebral atrophy seen.  There is some compensatory ventricular dilatation and periventricular white matter changes consistent with the patient's age.  Calvarium is intact.  The posterior fossa is unremarkable..  The visualized portions of the paranasal sinuses show no acute abnormality.  Impression: Chronic age-related changes.  No acute process    Electronically signed by: Nona Fernández  Date:    03/10/2023  Time:    13:18  X-Ray Chest 1 View  Narrative: EXAMINATION:  XR CHEST 1 VIEW    CLINICAL HISTORY:  Injury, unspecified, initial encounter    TECHNIQUE:  AP chest    COMPARISON:  None.    FINDINGS:  The heart is normal in size.  There is no focal airspace consolidation.  There is no pleural effusion or definite visible pneumothorax.  Impression: No acute abnormality of the chest.    Electronically signed by: Agatha Buenrostro  Date:    03/10/2023  Time:    13:16        ASSESSMENT & PLAN:     Acute CVA  Rhabdomyolysis  Hx of glaucoma    Plan:  Neurology consulted, follow up with recommendations  MRI brain, B Carotid US and ECHO pending  Lipid panel, hemoglobin A1c  pending  Hold antihypertensives to allow for permissive hypertension  PRN labetalol as needed for SBP greater than 220 or DBP greater than 100  PT/OT/SLP to evaluate and treat when appropriate  Neuro checks every 4 hours  Fall  precautions  NS at 100 ml/hr  Resume other appropriate home medications  Labs in AM       VTE Prophylaxis: SCDs    Discharge Planning and Disposition: TBD    I, Nitza Foreman, NP have reviewed and discussed the case with Dr. Barger.  Please see the attending MD's addendum for further assessment and plan.    iNtza Foreman, AGACNP-BC  03/10/2023    _______________________________________________________________________________  MD Addendum:  I, Dr. Denita MD, assumed care of this patient today at6pm  For the patient encounter, I performed the substantive portion of the visit, I reviewed the NP/PA documentation, treatment plan, and medical decision making.  I had face to face time with this patient     A. History:  Shauna Walters is a 66 y.o. male with a PMHx of glaucoma who presented to [facility] via EMS as a level 2 trauma on 3/10/2023 following an unwitnessed fall on 03/09/2023.  Patient was last seen normal between 5-6 p.m. on 03/09/2023.  He was found on the floor by his brother-in-law on 03/10/2023 laying on his right side, and was noted to have right-sided facial swelling, right-sided weakness and increased confusion. Family member reports that he was lost to follow-up at [facility], and had a CT Head sometime back in 2021 that he was not ever told the results of. Family also states that he takes a while to respond after waking in the morning, and ambulates with shuffling gait from time to time.      ED vital signs were stable.  Labs were notable for WBC 12.6, hemoglobin 13.9, hematocrit 39.6, glucose 120, CPK 3906.  Troponin undetectable.  Imaging negative for acute traumatic injuries.  CT head negative for acute intracranial abnormalities, chronic age-related changes noted.  He was admitted to hospital medicine services for further workup and management of care.    B. Physical exam:  General: In no acute distress, afebrile  Chest: Clear to auscultation bilaterally  Heart: RRR, +S1, S2, no appreciable  murmur  Abdomen: Soft, nontender, BS +  MSK: Warm, no lower extremity edema, no clubbing or cyanosis  Neurologic: Alert and oriented x4, Cranial nerve II-XII intact, Strength 5/5 in all 4 extremities     C. Medical decision making:  Fall  ?Syncope/Seizure and Collapse  ?Suspect CVA  Elevated CPK  Mild Leukocytosis  Hx of Glaucoma    Plan:  Imaging obtain so far were reviewed, f/u MRI,ECHO,Carotid US, EEG  Lipid panel, hemoglobin A1c  pending  Neurology consulted, follow up with recommendations  PT/OT/SLP to evaluate and treat when appropriate, will further assess if he needs any MRI spine given concerns with Gait lately  Neuro checks every 4 hours  Obtain EKG,Monitor on telemetry  Monitor Electrolytes, check Mag, phos  Increase Fluid Rate, monitor CPK, Monitor Cr  F/u Blood cultures, monitor fever curve,wbc  Cont Fall precautions, Seizure precautions  No Home Meds      All diagnosis and differential diagnosis have been reviewed; assessment and plan has been documented; I have personally reviewed the labs and test results that are presently available; I have reviewed the patients medication list; I have reviewed the consulting providers response and recommendations. I have reviewed or attempted to review medical records based upon their availability.    All of the patient and family questions have been addressed and answered. Patient's is agreeable to the above stated plan. I will continue to monitor closely and make adjustments to medical management as needed.      03/10/2023

## 2023-03-10 NOTE — PT/OT/SLP PROGRESS
Occupational Therapy      Patient Name:  Shauna Walters   MRN:  [id number]    OT orders received. Patient not seen today secondary to 24 hour bedrest per stroke protocol. Will follow-up tomorrow.    3/10/2023

## 2023-03-11 PROBLEM — M62.82 RHABDOMYOLYSIS: Status: ACTIVE | Noted: 2023-03-11

## 2023-03-11 LAB
AFP-TM SERPL-MCNC: <2 NG/ML
ALBUMIN SERPL-MCNC: 2.7 G/DL (ref 3.4–4.8)
ALBUMIN/GLOB SERPL: 1.2 RATIO (ref 1.1–2)
ALP SERPL-CCNC: 69 UNIT/L (ref 40–150)
ALT SERPL-CCNC: 27 UNIT/L (ref 0–55)
APTT PPP: 24.8 SECONDS (ref 23.2–33.7)
AST SERPL-CCNC: 80 UNIT/L (ref 5–34)
BASOPHILS # BLD AUTO: 0.06 X10(3)/MCL (ref 0–0.2)
BASOPHILS NFR BLD AUTO: 0.6 %
BILIRUBIN DIRECT+TOT PNL SERPL-MCNC: 1.2 MG/DL
BUN SERPL-MCNC: 13.5 MG/DL (ref 8.4–25.7)
CALCIUM SERPL-MCNC: 8.1 MG/DL (ref 8.8–10)
CANCER AG19-9 SERPL-ACNC: 7.38 UNIT/ML (ref 0–37)
CEA SERPL-MCNC: 2.41 NG/ML (ref 0–3)
CHLORIDE SERPL-SCNC: 109 MMOL/L (ref 98–107)
CK MB SERPL-MCNC: 39.6 NG/ML
CK SERPL-CCNC: 3905 U/L (ref 30–200)
CO2 SERPL-SCNC: 22 MMOL/L (ref 23–31)
CREAT SERPL-MCNC: 0.84 MG/DL (ref 0.73–1.18)
EOSINOPHIL # BLD AUTO: 0.16 X10(3)/MCL (ref 0–0.9)
EOSINOPHIL NFR BLD AUTO: 1.6 %
ERYTHROCYTE [DISTWIDTH] IN BLOOD BY AUTOMATED COUNT: 13.3 % (ref 11.5–17)
EST. AVERAGE GLUCOSE BLD GHB EST-MCNC: 108.3 MG/DL
GFR SERPLBLD CREATININE-BSD FMLA CKD-EPI: >60 MLS/MIN/1.73/M2
GLOBULIN SER-MCNC: 2.3 GM/DL (ref 2.4–3.5)
GLUCOSE SERPL-MCNC: 88 MG/DL (ref 82–115)
HBA1C MFR BLD: 5.4 %
HCT VFR BLD AUTO: 35.6 % (ref 42–52)
HGB BLD-MCNC: 12.4 G/DL (ref 14–18)
IMM GRANULOCYTES # BLD AUTO: 0.06 X10(3)/MCL (ref 0–0.04)
IMM GRANULOCYTES NFR BLD AUTO: 0.6 %
INR BLD: 1.16 (ref 0–1.3)
LEFT CCA DIST DIAS: 11 CM/S
LEFT CCA DIST SYS: 62 CM/S
LEFT CCA PROX DIAS: 14 CM/S
LEFT CCA PROX SYS: 79 CM/S
LEFT ECA DIAS: 4 CM/S
LEFT ECA SYS: 57 CM/S
LEFT ICA DIST DIAS: 14 CM/S
LEFT ICA DIST SYS: 55 CM/S
LEFT ICA MID DIAS: 13 CM/S
LEFT ICA MID SYS: 74 CM/S
LEFT ICA PROX DIAS: 10 CM/S
LEFT ICA PROX SYS: 52 CM/S
LEFT VERTEBRAL DIAS: 9 CM/S
LEFT VERTEBRAL SYS: 48 CM/S
LYMPHOCYTES # BLD AUTO: 0.94 X10(3)/MCL (ref 0.6–4.6)
LYMPHOCYTES NFR BLD AUTO: 9.5 %
MAGNESIUM SERPL-MCNC: 1.8 MG/DL (ref 1.6–2.6)
MCH RBC QN AUTO: 32.7 PG
MCHC RBC AUTO-ENTMCNC: 34.8 G/DL (ref 33–36)
MCV RBC AUTO: 93.9 FL (ref 80–94)
MONOCYTES # BLD AUTO: 0.93 X10(3)/MCL (ref 0.1–1.3)
MONOCYTES NFR BLD AUTO: 9.4 %
NEUTROPHILS # BLD AUTO: 7.78 X10(3)/MCL (ref 2.1–9.2)
NEUTROPHILS NFR BLD AUTO: 78.3 %
NRBC BLD AUTO-RTO: 0 %
OHS CV CAROTID RIGHT ICA EDV HIGHEST: 15
OHS CV CAROTID ULTRASOUND LEFT ICA/CCA RATIO: 1.19
OHS CV CAROTID ULTRASOUND RIGHT ICA/CCA RATIO: 0.96
OHS CV PV CAROTID LEFT HIGHEST CCA: 79
OHS CV PV CAROTID LEFT HIGHEST ICA: 74
OHS CV PV CAROTID RIGHT HIGHEST CCA: 85
OHS CV PV CAROTID RIGHT HIGHEST ICA: 82
OHS CV US CAROTID LEFT HIGHEST EDV: 14
PHOSPHATE SERPL-MCNC: 3.1 MG/DL (ref 2.3–4.7)
PLATELET # BLD AUTO: 225 X10(3)/MCL (ref 130–400)
PMV BLD AUTO: 9.9 FL (ref 7.4–10.4)
POTASSIUM SERPL-SCNC: 3.7 MMOL/L (ref 3.5–5.1)
PROT SERPL-MCNC: 5 GM/DL (ref 5.8–7.6)
PROTHROMBIN TIME: 14.6 SECONDS (ref 12.5–14.5)
RBC # BLD AUTO: 3.79 X10(6)/MCL (ref 4.7–6.1)
RIGHT CCA DIST DIAS: 15 CM/S
RIGHT CCA DIST SYS: 85 CM/S
RIGHT CCA PROX DIAS: 15 CM/S
RIGHT CCA PROX SYS: 79 CM/S
RIGHT ECA DIAS: 11 CM/S
RIGHT ECA SYS: 83 CM/S
RIGHT ICA DIST DIAS: 9 CM/S
RIGHT ICA DIST SYS: 56 CM/S
RIGHT ICA MID DIAS: 11 CM/S
RIGHT ICA MID SYS: 80 CM/S
RIGHT ICA PROX DIAS: 15 CM/S
RIGHT ICA PROX SYS: 82 CM/S
RIGHT VERTEBRAL DIAS: 5 CM/S
RIGHT VERTEBRAL SYS: 26 CM/S
SODIUM SERPL-SCNC: 137 MMOL/L (ref 136–145)
TROPONIN I SERPL-MCNC: 0.01 NG/ML (ref 0–0.04)
WBC # SPEC AUTO: 9.9 X10(3)/MCL (ref 4.5–11.5)

## 2023-03-11 PROCEDURE — 85730 THROMBOPLASTIN TIME PARTIAL: CPT | Performed by: NURSE PRACTITIONER

## 2023-03-11 PROCEDURE — 85610 PROTHROMBIN TIME: CPT | Performed by: NURSE PRACTITIONER

## 2023-03-11 PROCEDURE — 82553 CREATINE MB FRACTION: CPT | Performed by: NURSE PRACTITIONER

## 2023-03-11 PROCEDURE — 84484 ASSAY OF TROPONIN QUANT: CPT | Performed by: NURSE PRACTITIONER

## 2023-03-11 PROCEDURE — 25000003 PHARM REV CODE 250: Performed by: INTERNAL MEDICINE

## 2023-03-11 PROCEDURE — 83036 HEMOGLOBIN GLYCOSYLATED A1C: CPT | Performed by: NURSE PRACTITIONER

## 2023-03-11 PROCEDURE — 21400001 HC TELEMETRY ROOM

## 2023-03-11 PROCEDURE — 80053 COMPREHEN METABOLIC PANEL: CPT | Performed by: NURSE PRACTITIONER

## 2023-03-11 PROCEDURE — 84100 ASSAY OF PHOSPHORUS: CPT | Performed by: NURSE PRACTITIONER

## 2023-03-11 PROCEDURE — 85025 COMPLETE CBC W/AUTO DIFF WBC: CPT | Performed by: NURSE PRACTITIONER

## 2023-03-11 PROCEDURE — 63600175 PHARM REV CODE 636 W HCPCS: Performed by: INTERNAL MEDICINE

## 2023-03-11 PROCEDURE — 83735 ASSAY OF MAGNESIUM: CPT | Performed by: NURSE PRACTITIONER

## 2023-03-11 PROCEDURE — 82378 CARCINOEMBRYONIC ANTIGEN: CPT | Performed by: INTERNAL MEDICINE

## 2023-03-11 PROCEDURE — 95819 EEG AWAKE AND ASLEEP: CPT

## 2023-03-11 PROCEDURE — 82550 ASSAY OF CK (CPK): CPT | Performed by: INTERNAL MEDICINE

## 2023-03-11 RX ORDER — SODIUM CHLORIDE, SODIUM LACTATE, POTASSIUM CHLORIDE, CALCIUM CHLORIDE 600; 310; 30; 20 MG/100ML; MG/100ML; MG/100ML; MG/100ML
INJECTION, SOLUTION INTRAVENOUS CONTINUOUS
Status: ACTIVE | OUTPATIENT
Start: 2023-03-11 | End: 2023-03-12

## 2023-03-11 RX ADMIN — SODIUM CHLORIDE, POTASSIUM CHLORIDE, SODIUM LACTATE AND CALCIUM CHLORIDE: 600; 310; 30; 20 INJECTION, SOLUTION INTRAVENOUS at 04:03

## 2023-03-11 RX ADMIN — SODIUM CHLORIDE: 9 INJECTION, SOLUTION INTRAVENOUS at 04:03

## 2023-03-11 NOTE — CONSULTS
[facility]                       [address] [id number]    PATIENT NAME:       AROLDO WU  YOB: 1955  CSN:                [id number]   MRN:                [id number]  ADMIT DATE:         03/10/2023 12:21:00  PHYSICIAN:          Megan South MD                            CONSULTATION    DATE OF CONSULT:      REASON FOR CONSULTATION:  Apparently patient came because he fell.  He lives by   himself, was found on the floor.  We were consulted to rule out stroke.  He had   an MRI of the brain came back negative.  CT angiogram of the brain came back   negative for any high-grade stenosis.  Came to the room, patient is slightly   confused.  I do not know what is his baseline.  Unfortunately, his sister, who   was in the room, she said that he has been fine up to 2 months ago when she said   it seemed that he had some confusion started about 2 months ago.  The patient   is moving all extremities but is not moving the right upper extremity as good as   the left upper extremity.    PAST MEDICAL HISTORY:  Not significant for anything.  He does not take any   medication.  Does see any doctor.  Denies smoking.  Denied drinking.  Denied   taking any illicit drugs in his age.  CPK was 3900, probably has rhabdomyolysis   or may be dehydration.    EXAMINATION:  MENTAL STATUS:  Alert, awake, oriented x2, some confusion.   NEUROLOGIC:  Generalized weakness of upper and lower extremities, but he is not   moving his right upper extremity as good as the left, possibly because maybe   patient was lying on the floor on his right side more.  No Babinski sign.  Gait   was not tested.  Finger-to-nose hard to do on the right side.    ASSESSMENT PLAN:  Status post fall with some confusion.  I am going to order an   EEG to rule out any subclinical seizure.  CT angiogram and MRI of the brain came   back negative, so there is  no acute stroke.  The patient maybe has some   dementia going on to explain the confusion, rule out any other etiology, maybe   for encephalopathy such as toxic metabolic encephalopathy, extreme infection.  I   will leave that to the primary team.  The purpose of the EEG to rule out any   subclinical seizure and to see how this is cortical function but there is no   acute stroke to explain the patient's symptoms.        ______________________________  MD AKI Loving/RUBIO  DD:  03/11/2023  Time:  02:59PM  DT:  03/11/2023  Time:  03:07PM  Job #:  [id number]      CONSULTATION

## 2023-03-11 NOTE — NURSING
Nurses Note -- 4 Eyes      3/11/2023   12:53 AM      Skin assessed during: Admit      [] No Pressure Injuries Present    []Prevention Measures Documented      [x] Yes- Altered Skin Integrity Present or Discovered   [x] LDA Added if Not in Epic (Describe Wound)   [x] New Altered Skin Integrity was Present on Admit and Documented in LDA   [x] Wound Image Taken    Wound Care Consulted? Yes    Attending Nurse:  Linad Anthony RN     Second RN/Staff Member:  Marily Harvey RN

## 2023-03-11 NOTE — PROGRESS NOTES
[facility]                       [address] [address]    PATIENT NAME:       AROLDO WU  YOB: 1955  CSN:                [id number]   MRN:                [id number]  ADMIT DATE:         03/10/2023 12:21:00  PHYSICIAN:          Megan South MD                           TESTING    DATE OF SERVICE:      Study was done for confusion and memory loss to rule out seizure.    DESCRIPTION:  This electroencephalogram was done using [employer] 10/20   system, using 21 channels.  The background activity is consistent with 8.5 Hz   with no clear evidence of epileptiform discharges.    CONCLUSION:  This is a normal EEG.  No evidence of epileptiform discharges.        ______________________________  MD AKI Loving/AQS  DD:  2023  Time:  03:56PM  DT:  2023  Time:  04:01PM  Job #:  [id number]       TESTING

## 2023-03-11 NOTE — PROGRESS NOTES
SLP attempted to visit patient this AM for cognitive-linguistic evaluation, however, out of room for EEG. Will continue to follow and re-attempt as appropriate.

## 2023-03-11 NOTE — PLAN OF CARE
Problem: Cognitive Impairment (Stroke, Ischemic/Transient Ischemic Attack)  Goal: Optimal Cognitive Function  Outcome: Ongoing, Progressing     Problem: Communication Impairment (Stroke, Ischemic/Transient Ischemic Attack)  Goal: Improved Communication Skills  Outcome: Ongoing, Progressing     Problem: Functional Ability Impaired (Stroke, Ischemic/Transient Ischemic Attack)  Goal: Optimal Functional Ability  Outcome: Ongoing, Progressing     Problem: Respiratory Compromise (Stroke, Ischemic/Transient Ischemic Attack)  Goal: Effective Oxygenation and Ventilation  Outcome: Ongoing, Progressing     Problem: Adult Inpatient Plan of Care  Goal: Plan of Care Review  Outcome: Ongoing, Progressing  Goal: Patient-Specific Goal (Individualized)  Outcome: Ongoing, Progressing  Goal: Absence of Hospital-Acquired Illness or Injury  Outcome: Ongoing, Progressing  Goal: Optimal Comfort and Wellbeing  Outcome: Ongoing, Progressing  Goal: Readiness for Transition of Care  Outcome: Ongoing, Progressing     Problem: Impaired Wound Healing  Goal: Optimal Wound Healing  Outcome: Ongoing, Progressing     Problem: Fall Injury Risk  Goal: Absence of Fall and Fall-Related Injury  Outcome: Ongoing, Progressing     Problem: Skin Injury Risk Increased  Goal: Skin Health and Integrity  Outcome: Ongoing, Progressing

## 2023-03-11 NOTE — PROGRESS NOTES
[facility] Hospital Medicine Progress Note        Chief Complaint: Inpatient Follow-up    HPI:   67-year-old  male with significant history of glaucoma, vision loss secondary to same was brought to the family as a level 2 trauma after an unwitnessed fall at home.  Patient lives alone, but has good support system/family around who helps with his daily activities including cooking/feeding.  At baseline per family patient is able to ambulate, but has been falling a lot recently secondary to imbalance/vision issues secondary to glaucoma.  Patient was found at home on the floor on his right side with associated right-sided facial swelling, questionable right-sided weakness and increased confusion.  He was brought to the ED.  Hemodynamics were stable.  Lab significant for elevated CPK.  Troponins negative.  No acute injuries on imaging.  Hospitalist medicine services consulted and neurology services consulted given concern for CVA.  Stroke workup ordered.      Interval Hx:   Patient seen at bedside.  Comfortably laying in bed.  He is awake and alert . communication is minimal . brother-in-law is at bedside who answered all the questions paid no acute events overnight.    Objective/physical exam:  General: In no acute distress, afebrile  Chest: Clear to auscultation bilaterally  Heart: S1, S2, no appreciable murmur  Abdomen: Soft, nontender, BS +  MSK: Warm, no lower extremity edema, no clubbing or cyanosis  Neurologic:  Alert and awake, minimally communicative.  Patient has good strength on bilateral upper extremities paid noted bilateral lower extremity weakness  VITAL SIGNS: 24 HRS MIN & MAX LAST   Temp  Min: 97.9 °F (36.6 °C)  Max: 98.6 °F (37 °C) 98 °F (36.7 °C)   BP  Min: 109/62  Max: 161/96 109/62   Pulse  Min: 62  Max: 89  62   Resp  Min: 16  Max: 22 18   SpO2  Min: 98 %  Max: 100 % 100 %       Recent Labs   Lab 03/11/23  0452   WBC 9.9   RBC 3.79*   HGB 12.4*   HCT 35.6*    MCV 93.9   MCH 32.7   MCHC 34.8   RDW 13.3      MPV 9.9         Recent Labs   Lab 03/11/23  0452      K 3.7   CO2 22*   BUN 13.5   CREATININE 0.84   CALCIUM 8.1*   MG 1.80   ALBUMIN 2.7*   ALKPHOS 69   ALT 27   AST 80*   BILITOT 1.2          Microbiology Results (last 7 days)       ** No results found for the last 168 hours. **             Scheduled Med:         Assessment/Plan:    Adult failure to thrive  Acute nontraumatic rhabdomyolysis   Bilateral lower extremity weakness   Mechanical fall-recurrent  Progressive vision loss secondary to glaucoma  Mild hyperchloremic metabolic acidosis  Severe physical deconditioning   Prophylaxis    CVA ruled out   Ultrasound carotid is pending   Echocardiogram unremarkable except for diastolic dysfunction  Ordered MRI L-spine to further evaluate bilateral lower extremity weakness   EEG to rule out seizures given elevated CPK  Continue IV fluids for CPK-switched to Ringer lactate at 125 cc/hour   Monitor again tomorrow  No renal insufficiency  Severely physically deconditioned with failure to thrive   Consulted PT/OT   Will most likely need placement  CT chest, abdomen, pelvis and tumor markers to rule out occult malignancy  DVT prophylaxis-initiate Gadiel Donald MD   03/11/2023

## 2023-03-11 NOTE — PT/OT/SLP PROGRESS
Pt is still awaiting neuro consult as of this afternoon. Will hold OT today, but plan to follow up tomorrow as appropriate.     Eugenie SANCHEZ, OTR/L

## 2023-03-11 NOTE — PT/OT/SLP PROGRESS
Physical Therapy      Patient Name:  Cristopher Farooq   MRN:  [id number]    Patient not seen today secondary to neuro consult and recs remain pending. Will follow-up 3/12.

## 2023-03-12 LAB
CK SERPL-CCNC: 2749 U/L (ref 30–200)
PSA SERPL-MCNC: 3.22 NG/ML

## 2023-03-12 PROCEDURE — 84153 ASSAY OF PSA TOTAL: CPT | Performed by: INTERNAL MEDICINE

## 2023-03-12 PROCEDURE — 82550 ASSAY OF CK (CPK): CPT | Performed by: INTERNAL MEDICINE

## 2023-03-12 PROCEDURE — 97166 OT EVAL MOD COMPLEX 45 MIN: CPT

## 2023-03-12 PROCEDURE — 97162 PT EVAL MOD COMPLEX 30 MIN: CPT

## 2023-03-12 PROCEDURE — 21400001 HC TELEMETRY ROOM

## 2023-03-12 PROCEDURE — 25000003 PHARM REV CODE 250: Performed by: INTERNAL MEDICINE

## 2023-03-12 PROCEDURE — 63600175 PHARM REV CODE 636 W HCPCS: Performed by: INTERNAL MEDICINE

## 2023-03-12 RX ORDER — POLYETHYLENE GLYCOL 3350 17 G/17G
17 POWDER, FOR SOLUTION ORAL 2 TIMES DAILY
Status: DISCONTINUED | OUTPATIENT
Start: 2023-03-12 | End: 2023-03-16 | Stop reason: HOSPADM

## 2023-03-12 RX ORDER — ENOXAPARIN SODIUM 100 MG/ML
40 INJECTION SUBCUTANEOUS EVERY 24 HOURS
Status: DISCONTINUED | OUTPATIENT
Start: 2023-03-12 | End: 2023-03-16 | Stop reason: HOSPADM

## 2023-03-12 RX ADMIN — SODIUM CHLORIDE, POTASSIUM CHLORIDE, SODIUM LACTATE AND CALCIUM CHLORIDE: 600; 310; 30; 20 INJECTION, SOLUTION INTRAVENOUS at 01:03

## 2023-03-12 RX ADMIN — ENOXAPARIN SODIUM 40 MG: 40 INJECTION SUBCUTANEOUS at 05:03

## 2023-03-12 RX ADMIN — POLYETHYLENE GLYCOL 3350 17 G: 17 POWDER, FOR SOLUTION ORAL at 08:03

## 2023-03-12 RX ADMIN — POLYETHYLENE GLYCOL 3350 17 G: 17 POWDER, FOR SOLUTION ORAL at 02:03

## 2023-03-12 NOTE — PT/OT/SLP EVAL
Occupational Therapy   Evaluation    Name: Cristopher Farooq  MRN: [id number]  Admitting Diagnosis: Adult failure to thrive, Acute nontraumatic rhabdomyolysis, Bilateral lower extremity weakness, Mechanical fall-recurrent, Progressive vision loss secondary to glaucoma, Mild hyperchloremic metabolic acidosis, Severe physical deconditioning, Prophylaxis  Recent Surgery: * No surgery found *      Recommendations:     Discharge Recommendations: nursing facility, skilled  Discharge Equipment Recommendations: TBD, pending progress  Barriers to discharge: medical dx    Assessment:     Cristopher Farooq is a 67 y.o. male with a medical diagnosis of Adult failure to thrive, Acute nontraumatic rhabdomyolysis, Bilateral lower extremity weakness, Mechanical fall-recurrent, Progressive vision loss secondary to glaucoma, Mild hyperchloremic metabolic acidosis, Severe physical deconditioning, Prophylaxis. He presents with increased fatigue, difficulty following simple commands, and with limited verbal communication skills. Performance deficits affecting function: weakness, impaired endurance, impaired self care skills, impaired functional mobility, gait instability, impaired balance, impaired cognition, decreased upper extremity function, decreased lower extremity function, decreased safety awareness, decreased ROM, pain, impaired skin.      Rehab Prognosis: Good; patient would benefit from acute skilled OT services to address these deficits and reach maximum level of function.       Plan:     Patient to be seen 5 x/week to address the above listed problems via self-care/home management, therapeutic activities, therapeutic exercises  Plan of Care Expires: 03/24/23  Plan of Care Reviewed with: patient, sibling, other (see comments) (brother-in-law)    Subjective     Chief Complaint: Pt c/o pain with gentle PROM of R shoulder flex.  Family Comments/goals: Return to PLOF.    Occupational Profile:  Living Environment: Pt was living alone with dog in  "[facility] with NO MALIHA. Owns tub/shower.  However, pt's family reported that plan is for pt to live with his sister, niece, and nephew (who has disabilities 2/2 previous CVA) in [facility] with small threshold to enter (note: pt's sister reported that she places a small ramp over threshold). Pt's family owns a walk-in shower and walk-in tub with built-in benches and GBs in both. Pt's family also owns toilet GBs.  Previous level of function: Independent with ADLs (pt's sister reported that pt took sponge baths 2/2 pt's difficulty with performing tub/shower t/f), dep A with IADLs, and pt was not driving.  DME: Pt owns SC with 4-pt attachment (pt utilized cane "off and on" per pt's sister), RW, tray for RW, bedside commode, transport w/c (which was his mother's).  Assistance upon Discharge: Pt's family. However, pt would benefit from placement post-d/c to increase functional independence and decrease burden of care.    Patients cultural, spiritual, Yarsani conflicts given the current situation: no    Objective:     Communicated with: RN prior to session.  Patient found HOB elevated with telemetry, pulse ox (continuous), peripheral IV, bed alarm, UE Posey sleeve (to facilitate proper placement of IV) upon OT entry to room.    General Precautions: Standard, fall  Respiratory Status: Room air    Occupational Performance:    Bed Mobility:    Patient completed Rolling/Turning to Right/Left with dep A.  Patient completed Supine to Sit with dep A, using bed rail.  Patient completed Sit to Supine with dep A.    Functional Mobility/Transfers:  Patient completed Sit <> Stand Transfer with contact guard assistance  and [place] A provided for proper hand placement, using rolling walker   Functional Mobility: Pt performed lateral steps along EOB with min A and verbal and visual cues provided, using RW.    Activities of Daily Living:  Toileting: Pt required dep A to doff diaper and perform brenna-care at bed level 2/2 bladder " accident.    Cognitive/Visual Perceptual:  Cognitive/Psychosocial Skills:     -       Follows Commands/attention: Difficulty with following simple commands.  -       Communication: Limited verbal communication skills.  -       Safety awareness/insight to disability: impaired   Vision:  -       Impaired 2/2 glaucoma. Note: Pt's sister reported that she thinks that pt's glaucoma is worse in R eye.    Physical Exam:  Sensation:    -       Intact per pt  Upper Extremity Range of Motion:     -       Pt was unable to follow simple commands during ROM assessment despite max cues and functional tasks provided. NO AROM noted except for L elbow flex, which appeared WFL. Will continue to assess AROM as able. PROM WFL except R shoulder flex demonstrated mod deficits in PROM 2/2 reported pain.  Upper Extremity Strength:    -       Unable to formally assess 2/2 pt's increased difficulty with following simple commands. However, appeared WFL during sit > stand using RW. Will continue to assess as able.  Note: Skin tears noted on lateral aspect of R shoulder, R hip, L buttock, R knee, and R aspect of face. RN aware.    Patient left HOB elevated with all lines intact, call button in reach, bed alarm on, RN aware, pt's family present, and UE Posey sleeve re-donned.    GOALS:   Multidisciplinary Problems       Occupational Therapy Goals          Problem: Occupational Therapy    Goal Priority Disciplines Outcome Interventions   Occupational Therapy Goal     OT, PT/OT Ongoing, Progressing    Description: Goals to be met by: 3/24/23     Patient will increase functional independence with ADLs by performing:    UE Dressing with Stand-by Assistance.  LE Dressing with Minimal Assistance, using dressing AEDs as needed.  Grooming while standing at sink with Contact Guard Assistance.  Toileting from bedside commode with Minimal Assistance for hygiene and clothing management.                          History:     No past medical history on  file.    No past surgical history on file.    Time Tracking:     OT Date of Treatment: 3/12/23  OT Start Time: 1338  OT Stop Time: 1419  OT Total Time (min): 41 min    Billable Minutes: Evaluation Mod complexity     3/12/2023

## 2023-03-12 NOTE — PLAN OF CARE
Problem: Occupational Therapy  Goal: Occupational Therapy Goal  Description: Goals to be met by: 3/24/23     Patient will increase functional independence with ADLs by performing:    UE Dressing with Stand-by Assistance.  LE Dressing with Minimal Assistance, using dressing AEDs as needed.  Grooming while standing at sink with Contact Guard Assistance.  Toileting from bedside commode with Minimal Assistance for hygiene and clothing management.     Outcome: Ongoing, Progressing

## 2023-03-12 NOTE — PLAN OF CARE
Problem: Infection  Goal: Absence of Infection Signs and Symptoms  Outcome: Ongoing, Progressing     Problem: Adult Inpatient Plan of Care  Goal: Plan of Care Review  Outcome: Ongoing, Progressing  Goal: Patient-Specific Goal (Individualized)  Outcome: Ongoing, Progressing  Goal: Absence of Hospital-Acquired Illness or Injury  Outcome: Ongoing, Progressing     Problem: Fall Injury Risk  Goal: Absence of Fall and Fall-Related Injury  Outcome: Ongoing, Progressing     Problem: Skin Injury Risk Increased  Goal: Skin Health and Integrity  Outcome: Ongoing, Progressing

## 2023-03-12 NOTE — PLAN OF CARE
Problem: Physical Therapy  Goal: Physical Therapy Goal  Description: Goals to be met by: 2023     Patient will increase functional independence with mobility by performin. Supine to sit with Stand-by Assistance  2. Sit to supine with Stand-by Assistance  3. Sit to stand transfer with Stand-by Assistance  4. Gait  x 200 feet with Contact Guard Assistance using Rolling Walker.     Outcome: Ongoing, Progressing

## 2023-03-12 NOTE — PT/OT/SLP EVAL
Physical Therapy Evaluation/Re-Evaluation    Patient Name:  Cristopher Farooq   MRN:  [id number]    Recommendations:     Discharge Recommendations: nursing facility, skilled   Discharge Equipment Recommendations: none   Barriers to discharge:  Impaired mobility    Assessment:     Cristopher Farooq is a 67 y.o. male admitted with a medical diagnosis of dementia and unwitnessed fall,.  He presents with the following impairments/functional limitations: weakness, impaired endurance, impaired functional mobility, gait instability, impaired balance, impaired cognition, decreased safety awareness, decreased lower extremity function.    Rehab Prognosis: Good; patient would benefit from acute skilled PT services to address these deficits and reach maximum level of function.    Recent Surgery: * No surgery found *      Plan:     During this hospitalization, patient to be seen 6 x/week to address the identified rehab impairments via gait training, therapeutic activities, therapeutic exercises, neuromuscular re-education and progress toward the following goals:    Plan of Care Expires:  04/12/23    Subjective     Chief Complaint: Decreased functional mobility   Patient/Family Comments/goals: Improve functional mobility    Patients cultural, spiritual, Scientology conflicts given the current situation: no    Living Environment:  Prior to admission, patients level of function was dependent on brother in law and sister's support. Equipment used at home: walker, rolling.  DME owned (not currently used): none.  Upon discharge, patient will have assistance from family.    Objective:     Communicated with nurse prior to session.  Patient found HOB elevated with peripheral IV, telemetry  upon PT entry to room.    General Precautions: Standard, fall  Braces: N/A  Respiratory Status: Room air  Blood Pressure:  in supine= 100/48, sitting at EOB= 107/78, in standing= 124/80      Exams:  Cognitive Exam:  Patient is oriented to Person  RLE Strength: Deficits:  Gross LE 3+/5  LLE Strength: Deficits: Gross LE 3+/5  Skin integrity: Visible skin intact      Functional Mobility:  Bed Mobility:     Supine to Sit: moderate assistance  Sit to Supine: moderate assistance  Transfers:     Sit to Stand:  moderate assistance with rolling walker  Gait: Pt took 5 steps to recliner with min A, using RW, with TC for WS, and max VC for steps and RW repositioning during ambulation.         Treatment & Education:      Patient and pt's brother in law  provided with verbal education regarding PT plan of care and goals of treatment.  Understanding was verbalized.     Patient left up in chair with all lines intact, call button in reach, nurse notified, and brother in law present.    GOALS:   Multidisciplinary Problems       Physical Therapy Goals          Problem: Physical Therapy    Goal Priority Disciplines Outcome Goal Variances Interventions   Physical Therapy Goal     PT, PT/OT Ongoing, Progressing     Description: Goals to be met by: 2023     Patient will increase functional independence with mobility by performin. Supine to sit with Stand-by Assistance  2. Sit to supine with Stand-by Assistance  3. Sit to stand transfer with Stand-by Assistance  4. Gait  x 200 feet with Contact Guard Assistance using Rolling Walker.                          History:     No past medical history on file.    No past surgical history on file.    Time Tracking:     PT Received On: 23  PT Start Time: 923     PT Stop Time: 945  PT Total Time (min): 22 min     Billable Minutes: Evaluation Mod      2023

## 2023-03-12 NOTE — CONSULTS
Inpatient Nutrition Assessment    Admit Date: 3/10/2023   Total duration of encounter: 2 days     Nutrition Recommendation/Prescription     Continue diet w/ texture per SLP.     Add MVI.     Boost Plus TID. 360 kcals and 14 g protein per serving.     Monitor po intake. Consider adding fast-acting appetite stimulant if po intake does not improve quickly.     Encourage nutrient dense foods, less full-fugar soda.      Weekly standing weights.     Communication of Recommendations: reviewed with patient/caregiver and reviewed with nurse    Nutrition Assessment     Malnutrition Assessment/Nutrition-Focused Physical Exam    Malnutrition in the context of chronic illness  Degree of Malnutrition: non-severe (moderate) malnutrition  Energy Intake: </= 75% of estimated energy requirement for >/= 1 month  Interpretation of Weight Loss: >7.5% in 3 months  Body Fat: mild depletion  Area of Body Fat Loss: orbital region  and upper arm region - triceps / biceps  Muscle Mass Loss: mild depletion  Area of Muscle Mass Loss: temple region - temporalis muscle, clavicle bone region - pectoralis major, deltoid, trapezius muscles, dorsal hand - interosseous musle, and patellar region - quadricep muscle  Fluid Accumulation: does not meet criteria  Edema: does not meet criteria  Reduced  Strength: unable to obtain  A minimum of two characteristics is recommended for diagnosis of either severe or non-severe malnutrition.    Chart Review    Reason Seen: malnutrition screening tool (MST) and physician consult for decline in oral intake    Malnutrition Screening Tool Results   Have you recently lost weight without trying?: Unsure  Have you been eating poorly because of a decreased appetite?: Yes   MST Score: 3     Diagnosis:  Adult failure to thrive  Acute nontraumatic rhabdomyolysis   Bilateral lower extremity weakness   Mechanical fall-recurrent  Progressive vision loss secondary to glaucoma  Mild hyperchloremic metabolic acidosis  Severe  physical deconditioning   Prophylaxis    Relevant Medical History: glaucoma, vision loss     Nutrition-Related Medications: miralax, lactated ringers  Calorie Containing IV Medications: no significant kcals from medications at this time    Nutrition-Related Labs:  3/11: Cl 109, Ca 8.1, Alb 2.7, AST 80     Diet/PN Order: Diet Minced & Moist  Oral Supplement Order: none  Tube Feeding Order: none  Appetite/Oral Intake: poor/25-50% of meals  Factors Affecting Nutritional Intake: impaired cognitive status/motor control  Food/Presybeterian/Cultural Preferences: none reported  Food Allergies: shellfish    Skin Integrity: abrasion, bruised (ecchymotic)  Wound(s):      Altered Skin Integrity 03/10/23 1229 Right Scapula Abrasion(s)-Tissue loss description: Partial thickness       Altered Skin Integrity 03/10/23 1242 Right Cheek Contusion-Tissue loss description: Not applicable       Altered Skin Integrity 03/10/23 1242 Right dorsal Elbow Contusion-Tissue loss description: Partial thickness       Altered Skin Integrity 03/10/23 1243 Right anterior Knee Contusion-Tissue loss description: Partial thickness       Altered Skin Integrity 03/10/23 2200 Right lateral;posterior Hip Abrasion(s) Partial thickness tissue loss. Shallow open ulcer with a red or pink wound bed, without slough. Intact or Open/Ruptured Serum-filled blister.-Tissue loss description: Partial thickness       Altered Skin Integrity 03/10/23 2200 Right anterior Foot Abrasion(s) Partial thickness tissue loss. Shallow open ulcer with a red or pink wound bed, without slough. Intact or Open/Ruptured Serum-filled blister.-Tissue loss description: Partial thickness       Altered Skin Integrity 03/10/23 2200 Left anterior Foot Abrasion(s)-Tissue loss description: Partial thickness       Altered Skin Integrity 03/10/23 2200 Right lateral Ankle Abrasion(s) Partial thickness tissue loss. Shallow open ulcer with a red or pink wound bed, without slough. Intact or Open/Ruptured  "Serum-filled blister.-Tissue loss description: Partial thickness     Comments    3/11/23: Pt did not communicate much, has some AMS going on, his sister reports that his appetite has been becoming gradually more inconsistent over recent months and was much worse over the past 2 weeks, also became more forgetful over that time. He has lost about 14 lbs (~11%) unintentionally since December, sister and other family provide food for the patient, patient drinks up to 2 liters of full sugar soda per day. We discussed that this is likely displacing more useful/nutrient-dense source of calories in the diet.     Anthropometrics    Height: 5' 5" (165.1 cm)    Last Weight: 52.4 kg (115 lb 8 oz) (03/10/23 2200) Weight Method: Bed Scale  BMI (Calculated): 19.2  BMI Classification: underweight (BMI less than 22 if >65 years of age)        Ideal Body Weight (IBW), Male: 136 lb     % Ideal Body Weight, Male (lb): 84.93 %                          Usual Weight Provided By: family/caregiver  12/1/22: 130 lbs  Wt Readings from Last 5 Encounters:   03/10/23 52.4 kg (115 lb 8 oz)     Weight Change(s) Since Admission:  Admit Weight: 63.5 kg (140 lb) (03/10/23 1225)  3/10/23: 116 lbs    Estimated Needs    Weight Used For Calorie Calculations: 52.4 kg (115 lb 8.3 oz)  Energy Calorie Requirements (kcal): 1776-7769 kcals (35-40 kcals/kg)  Energy Need Method: Kcal/kg  Weight Used For Protein Calculations: 52.4 kg (115 lb 8.3 oz)  Protein Requirements: 70-95 g (1.5-1.8 g/kg)  Fluid Requirements (mL): 9205-0701 ml (1 ml/kcal)  Temp: 97.6 °F (36.4 °C)       Enteral Nutrition    Patient not receiving enteral nutrition at this time.    Parenteral Nutrition    Patient not receiving parenteral nutrition support at this time.    Evaluation of Received Nutrient Intake    Calories: not meeting estimated needs  Protein: not meeting estimated needs    Patient Education    Not applicable.    Nutrition Diagnosis     PES: Malnutrition related to energy " intake < energy demand, poor nutrition as evidenced by decreased po intake, unintentional weight loss, muscle and fat losses, consumes 700-800 kcals per day from soda per family. (new)    Interventions/Goals     Intervention(s): general/healthful diet, commercial beverage, multivitamin/mineral supplement therapy, prescription medication, and collaboration with other providers  Goal: Meet greater than 75% of nutritional needs by follow-up. (new)    Monitoring & Evaluation     Dietitian will monitor food and beverage intake, weight, and electrolyte/renal panel.  Nutrition Risk/Follow-Up: high (follow-up in 1-4 days)   Please consult if re-assessment needed sooner.

## 2023-03-12 NOTE — PROGRESS NOTES
[facility] Hospital Medicine Progress Note        Chief Complaint: Inpatient Follow-up    HPI:   67-year-old  male with significant history of glaucoma, vision loss secondary to same was brought to the family as a level 2 trauma after an unwitnessed fall at home.  Patient lives alone, but has good support system/family around who helps with his daily activities including cooking/feeding.  At baseline per family patient is able to ambulate, but has been falling a lot recently secondary to imbalance/vision issues secondary to glaucoma.  Patient was found at home on the floor on his right side with associated right-sided facial swelling, questionable right-sided weakness and increased confusion.  He was brought to the ED.  Hemodynamics were stable.  Lab significant for elevated CPK.  Troponins negative.  No acute injuries on imaging.  Hospitalist medicine services consulted and neurology services consulted given concern for CVA.  Stroke workup ordered.  Stroke workup was negative.  Echocardiogram unremarkable except for diastolic dysfunction paid bilateral lower extremity weakness noted and therefore MRI L-spine ordered.  EEG to rule out seizures given elevated CPK.  IV fluids continued.  No renal insufficiency.  Severe physical deconditioning and therefore therapy services consulted paid CT chest, abdomen, pelvis to rule out occult malignancy.  Also ordered tumor markers.      Interval Hx:   Patient seen at bedside.  Comfortably sitting in the couch.  Brother-in-law at bedside.  Hemodynamics stable.  But BP is on low normal side.  No acute events overnight.    Objective/physical exam:  General: In no acute distress, afebrile  Chest: Clear to auscultation bilaterally  Heart: S1, S2, no appreciable murmur  Abdomen: Soft, nontender, BS +  MSK: Warm, no lower extremity edema, no clubbing or cyanosis  Neurologic:  Alert and awake, minimally communicative.  Global weakness  VITAL  SIGNS: 24 HRS MIN & MAX LAST   Temp  Min: 97.4 °F (36.3 °C)  Max: 98.6 °F (37 °C) 98.1 °F (36.7 °C)   BP  Min: 94/57  Max: 126/65 126/65   Pulse  Min: 55  Max: 75  (!) 55   Resp  Min: 17  Max: 18 18   SpO2  Min: 98 %  Max: 100 % 98 %       Recent Labs   Lab 03/11/23  0452   WBC 9.9   RBC 3.79*   HGB 12.4*   HCT 35.6*   MCV 93.9   MCH 32.7   MCHC 34.8   RDW 13.3      MPV 9.9         Recent Labs   Lab 03/11/23  0452      K 3.7   CO2 22*   BUN 13.5   CREATININE 0.84   CALCIUM 8.1*   MG 1.80   ALBUMIN 2.7*   ALKPHOS 69   ALT 27   AST 80*   BILITOT 1.2          Microbiology Results (last 7 days)       ** No results found for the last 168 hours. **             Scheduled Med:   polyethylene glycol  17 g Oral BID          Assessment/Plan:    Adult failure to thrive  Acute nontraumatic rhabdomyolysis   Global weakness  Mechanical fall-recurrent  Progressive vision loss secondary to glaucoma  Mild hyperchloremic metabolic acidosis  Severe physical deconditioning   Prophylaxis    CVA ruled out   Ultrasound carotid negative  Echocardiogram unremarkable except for diastolic dysfunction  Patient is globally weak, I discussed with therapy services today   They did work with him and did not appreciate any focal weakness  MRI L-spine was ordered yesterday-degenerative changes/spondylosis   EEG normal  Continue IV fluids for rhabdomyolysis   CPK trending down   Continue to monitor  No renal insufficiency  Severely physically deconditioned with failure to thrive   PT/OT on board   Recommending skilled nursing facility placement  CT chest, abdomen, pelvis with no impressive findings except for questionable cyst in kidney and liver lesions  I have ordered ultrasound abdomen to further evaluate this   Tumor markers not impressive  MiraLax for constipation  Patient is not on any scheduled meds at home.  DVT prophylaxis-Elkinx    Nallely Donald MD   03/12/2023

## 2023-03-13 LAB
ALBUMIN SERPL-MCNC: 2.7 G/DL (ref 3.4–4.8)
ALBUMIN/GLOB SERPL: 1.2 RATIO (ref 1.1–2)
ALP SERPL-CCNC: 64 UNIT/L (ref 40–150)
ALT SERPL-CCNC: 33 UNIT/L (ref 0–55)
AST SERPL-CCNC: 58 UNIT/L (ref 5–34)
BASOPHILS # BLD AUTO: 0.06 X10(3)/MCL (ref 0–0.2)
BASOPHILS NFR BLD AUTO: 0.6 %
BILIRUBIN DIRECT+TOT PNL SERPL-MCNC: 0.8 MG/DL
BUN SERPL-MCNC: 8.1 MG/DL (ref 8.4–25.7)
CALCIUM SERPL-MCNC: 8.2 MG/DL (ref 8.8–10)
CHLORIDE SERPL-SCNC: 106 MMOL/L (ref 98–107)
CK SERPL-CCNC: 1965 U/L (ref 30–200)
CO2 SERPL-SCNC: 25 MMOL/L (ref 23–31)
CREAT SERPL-MCNC: 0.68 MG/DL (ref 0.73–1.18)
EOSINOPHIL # BLD AUTO: 0.22 X10(3)/MCL (ref 0–0.9)
EOSINOPHIL NFR BLD AUTO: 2.4 %
ERYTHROCYTE [DISTWIDTH] IN BLOOD BY AUTOMATED COUNT: 12.9 % (ref 11.5–17)
GFR SERPLBLD CREATININE-BSD FMLA CKD-EPI: >60 MLS/MIN/1.73/M2
GLOBULIN SER-MCNC: 2.3 GM/DL (ref 2.4–3.5)
GLUCOSE SERPL-MCNC: 92 MG/DL (ref 82–115)
HCT VFR BLD AUTO: 36.3 % (ref 42–52)
HGB BLD-MCNC: 12.7 G/DL (ref 14–18)
IMM GRANULOCYTES # BLD AUTO: 0.05 X10(3)/MCL (ref 0–0.04)
IMM GRANULOCYTES NFR BLD AUTO: 0.5 %
LYMPHOCYTES # BLD AUTO: 1.58 X10(3)/MCL (ref 0.6–4.6)
LYMPHOCYTES NFR BLD AUTO: 17.1 %
MCH RBC QN AUTO: 32.6 PG
MCHC RBC AUTO-ENTMCNC: 35 G/DL (ref 33–36)
MCV RBC AUTO: 93.1 FL (ref 80–94)
MONOCYTES # BLD AUTO: 0.66 X10(3)/MCL (ref 0.1–1.3)
MONOCYTES NFR BLD AUTO: 7.1 %
NEUTROPHILS # BLD AUTO: 6.69 X10(3)/MCL (ref 2.1–9.2)
NEUTROPHILS NFR BLD AUTO: 72.3 %
NRBC BLD AUTO-RTO: 0 %
PLATELET # BLD AUTO: 228 X10(3)/MCL (ref 130–400)
PMV BLD AUTO: 10.1 FL (ref 7.4–10.4)
POTASSIUM SERPL-SCNC: 3.8 MMOL/L (ref 3.5–5.1)
PROT SERPL-MCNC: 5 GM/DL (ref 5.8–7.6)
RBC # BLD AUTO: 3.9 X10(6)/MCL (ref 4.7–6.1)
SODIUM SERPL-SCNC: 137 MMOL/L (ref 136–145)
WBC # SPEC AUTO: 9.3 X10(3)/MCL (ref 4.5–11.5)

## 2023-03-13 PROCEDURE — 25000003 PHARM REV CODE 250

## 2023-03-13 PROCEDURE — 94761 N-INVAS EAR/PLS OXIMETRY MLT: CPT

## 2023-03-13 PROCEDURE — 97116 GAIT TRAINING THERAPY: CPT | Mod: CQ

## 2023-03-13 PROCEDURE — 80053 COMPREHEN METABOLIC PANEL: CPT | Performed by: INTERNAL MEDICINE

## 2023-03-13 PROCEDURE — 82550 ASSAY OF CK (CPK): CPT | Performed by: INTERNAL MEDICINE

## 2023-03-13 PROCEDURE — 85025 COMPLETE CBC W/AUTO DIFF WBC: CPT | Performed by: INTERNAL MEDICINE

## 2023-03-13 PROCEDURE — 21400001 HC TELEMETRY ROOM

## 2023-03-13 PROCEDURE — 25000003 PHARM REV CODE 250: Performed by: INTERNAL MEDICINE

## 2023-03-13 PROCEDURE — 92523 SPEECH SOUND LANG COMPREHEN: CPT

## 2023-03-13 PROCEDURE — 63600175 PHARM REV CODE 636 W HCPCS: Performed by: INTERNAL MEDICINE

## 2023-03-13 RX ORDER — CARBIDOPA AND LEVODOPA 25; 100 MG/1; MG/1
1 TABLET, EXTENDED RELEASE ORAL 2 TIMES DAILY
Status: DISCONTINUED | OUTPATIENT
Start: 2023-03-13 | End: 2023-03-14

## 2023-03-13 RX ORDER — SODIUM CHLORIDE, SODIUM LACTATE, POTASSIUM CHLORIDE, CALCIUM CHLORIDE 600; 310; 30; 20 MG/100ML; MG/100ML; MG/100ML; MG/100ML
INJECTION, SOLUTION INTRAVENOUS CONTINUOUS
Status: ACTIVE | OUTPATIENT
Start: 2023-03-13 | End: 2023-03-14

## 2023-03-13 RX ADMIN — POLYETHYLENE GLYCOL 3350 17 G: 17 POWDER, FOR SOLUTION ORAL at 09:03

## 2023-03-13 RX ADMIN — SODIUM CHLORIDE, POTASSIUM CHLORIDE, SODIUM LACTATE AND CALCIUM CHLORIDE: 600; 310; 30; 20 INJECTION, SOLUTION INTRAVENOUS at 09:03

## 2023-03-13 RX ADMIN — ENOXAPARIN SODIUM 40 MG: 40 INJECTION SUBCUTANEOUS at 05:03

## 2023-03-13 RX ADMIN — CARBIDOPA AND LEVODOPA 1 TABLET: 25; 100 TABLET, EXTENDED RELEASE ORAL at 09:03

## 2023-03-13 NOTE — CONSULTS
This note is link consult to order. See full consult note per Dr. South.    Michelle Domingo, [facility]  Inpatient Neurology

## 2023-03-13 NOTE — PLAN OF CARE
Problem: Infection  Goal: Absence of Infection Signs and Symptoms  Outcome: Ongoing, Progressing     Problem: Adjustment to Illness (Stroke, Ischemic/Transient Ischemic Attack)  Goal: Optimal Coping  Outcome: Ongoing, Progressing     Problem: Adult Inpatient Plan of Care  Goal: Plan of Care Review  Outcome: Ongoing, Progressing  Goal: Patient-Specific Goal (Individualized)  Outcome: Ongoing, Progressing  Goal: Optimal Comfort and Wellbeing  Outcome: Ongoing, Progressing     Problem: Fall Injury Risk  Goal: Absence of Fall and Fall-Related Injury  Outcome: Ongoing, Progressing     Problem: Skin Injury Risk Increased  Goal: Skin Health and Integrity  Outcome: Ongoing, Progressing

## 2023-03-13 NOTE — PT/OT/SLP EVAL
Speech Language Pathology Department  Cognitive-Communication Evaluation    Patient Name:  Cristopher Farooq   MRN:  [id number]    Recommendations:     General recommendations:  SLP intervention not indicated  Communication strategies:  provide increased time to answer  Barriers to safe discharge: acuity of illness    History:     Cristopher Farooq is a/n 67 y.o. male admitted with CVA workup.    No past medical history on file.  No past surgical history on file.    Previous level of Function  Lives: alone  Handed: Right  Glasses: yes  Hearing Aids: no  MRI negative     Subjective     Patient awake and distracted.  Spiritual/Cultural/Latter day Beliefs/Practices that affect care:  no  Pain/Comfort:  0    Objective:     SPEECH PRODUCTION  Speech Intelligibility  Known Context: 75%-90%  Unknown Context: 50%-75%    AUDITORY COMPREHENSION  Identification:  Body parts: 100%  Objects: 100%  Following Directions:  1-Step: 70%  Yes/No Questions:  Biographical: 90%  Environmental: 90%  Simple: 90%  Complex: 90%    VERBAL EXPRESSION  Automatic Speech:  Days of the week: 100%  Months of the year: 70%  Confrontation Naming  Body Parts: 100%  Objects: 100%  Wh- Questions:  Object name: 70%  Object function: 70%    COGNITION  Orientation:  Person: wnl  Place: wnl  Time: wnl    Assessment:   Pt presents with impairments likely baseline. REC: 24 hr supervision vs home health services for placement to ensure safe transition to discharge environment.     Goals:   Multidisciplinary Problems       SLP Goals       Not on file                  Patient Education:     Patient provided with verbal education regarding POC.  Understanding was verbalized.       Time Tracking:     SLP Treatment Date:    3/13/23  Speech Start Time:   0830  Speech Stop Time:      0850  Speech Total Time (min):   20    Billable minutes:  Evaluation of Speech Sound Production with Comprehension and Expression, 20 03/13/2023

## 2023-03-13 NOTE — PROGRESS NOTES
[facility] Hospital Medicine Progress Note        Chief Complaint: Inpatient Follow-up    HPI:   67-year-old  male with significant history of glaucoma, vision loss secondary to same was brought to the family as a level 2 trauma after an unwitnessed fall at home.  Patient lives alone, but has good support system/family around who helps with his daily activities including cooking/feeding.  At baseline per family patient is able to ambulate, but has been falling a lot recently secondary to imbalance/vision issues secondary to glaucoma.  Patient was found at home on the floor on his right side with associated right-sided facial swelling, questionable right-sided weakness and increased confusion.  He was brought to the ED.  Hemodynamics were stable.  Lab significant for elevated CPK.  Troponins negative.  No acute injuries on imaging.  Hospitalist medicine services consulted and neurology services consulted given concern for CVA.  Stroke workup ordered.  Stroke workup was negative.  Echocardiogram unremarkable except for diastolic dysfunction paid bilateral lower extremity weakness noted and therefore MRI L-spine ordered.  EEG to rule out seizures given elevated CPK.  IV fluids continued.  No renal insufficiency.  Severe physical deconditioning and therefore therapy services consulted .  CT chest, abdomen, pelvis to rule out occult malignancy.  Also ordered tumor markers.  CT chest, abdomen, pelvis not impressive except for some liver lesion/renal lesions for which ultrasound abdomen was ordered to further evaluate.  Tumor markers negative.  CPK trending down on IV fluids.      Interval Hx:   Patient seen at bedside.  Patient is comfortably laying in bed . family member at bedside.  No new complaints .  no change in neurological status.  Hemodynamics stable.  Afebrile.  Patient is minimally communicative.    Objective/physical exam:  General: In no acute distress, afebrile  Chest:  Clear to auscultation bilaterally  Heart: S1, S2, no appreciable murmur  Abdomen: Soft, nontender, BS +  MSK: Warm, no lower extremity edema, no clubbing or cyanosis  Neurologic:  Alert and awake, minimally communicative.  Global weakness  VITAL SIGNS: 24 HRS MIN & MAX LAST   Temp  Min: 97.5 °F (36.4 °C)  Max: 98.6 °F (37 °C) 97.8 °F (36.6 °C)   BP  Min: 91/57  Max: 108/70 108/70   Pulse  Min: 65  Max: 74  65   Resp  Min: 17  Max: 18 18   SpO2  Min: 97 %  Max: 100 % 99 %       Recent Labs   Lab 03/13/23  0424   WBC 9.3   RBC 3.90*   HGB 12.7*   HCT 36.3*   MCV 93.1   MCH 32.6   MCHC 35.0   RDW 12.9      MPV 10.1         Recent Labs   Lab 03/11/23  0452 03/13/23  0424    137   K 3.7 3.8   CO2 22* 25   BUN 13.5 8.1*   CREATININE 0.84 0.68*   CALCIUM 8.1* 8.2*   MG 1.80  --    ALBUMIN 2.7* 2.7*   ALKPHOS 69 64   ALT 27 33   AST 80* 58*   BILITOT 1.2 0.8          Microbiology Results (last 7 days)       ** No results found for the last 168 hours. **             Scheduled Med:   enoxaparin  40 mg Subcutaneous Daily    polyethylene glycol  17 g Oral BID          Assessment/Plan:    Adult failure to thrive  Acute nontraumatic rhabdomyolysis   Global weakness  Bradykinesia with progressive cognitive decline  Mechanical fall-recurrent  Progressive vision loss secondary to glaucoma  Severe physical deconditioning   Prophylaxis    CVA ruled out , MRI brain non impressive  Ultrasound carotid negative  Echocardiogram unremarkable except for diastolic dysfunction  Patient is globally weak  Bradykinetic, also has progressive cognitive decline   MRI L-spine -degenerative changes/spondylosis   I have also ordered MRI C-spine  EEG normal  I have requested Neurology to re-evaluate him to rule out other neurological illness including Parkinson's  Continue IV fluids for rhabdomyolysis   CPK trending down -1965 today  Continue to monitor  No renal insufficiency  Severely physically deconditioned with failure to thrive    PT/OT on board   Recommending skilled nursing facility placement  CT chest, abdomen, pelvis with no impressive findings except for questionable cyst in kidney and liver lesions  Evaluated these with ultrasound-non impressive  Tumor markers not impressive  MiraLax for constipation  Patient is not on any scheduled meds at home.  DVT prophylaxis-Lovenox    Labs personally reviewed   CPK trended down to 1965   Discussed with  regarding the need for skilled nursing facility placement.    Nallely Donald MD   03/13/2023

## 2023-03-13 NOTE — PT/OT/SLP PROGRESS
Physical Therapy Treatment    Patient Name:  Cristopher Farooq   MRN:  [id number]    Recommendations:     Discharge Recommendations:  nursing facility, skilled   Discharge Equipment Recommendations: none     Subjective     Patient awake and alert.     Objective:     General Precautions: Standard, fall   Orthopedic Precautions:    Braces:    Respiratory Status: Room air   Communicated with nurse prior to treatment.     Functional Mobility:    Rolling:Minimal Assistance  Supine to sit:Minimal Assistance  Sit to stand transfer: Minimal Assistance  Bed to chair transfer:Minimal Assistance    Gait 100 ft and 125 ft with RW min assist and also min HHA x 2. Pt still required cues to decrease lean right and to increase ZHEN by shifting right foot more lateral to increase overall gait stability. MRI of c-spine still pending.     Patient left up in chair with  brothradha mccormack present to provide supervision.     AM-PAC 6 CLICK MOBILITY        GOALS:   Multidisciplinary Problems       Physical Therapy Goals          Problem: Physical Therapy    Goal Priority Disciplines Outcome Goal Variances Interventions   Physical Therapy Goal     PT, PT/OT Ongoing, Progressing     Description: Goals to be met by: 2023     Patient will increase functional independence with mobility by performin. Supine to sit with Stand-by Assistance  2. Sit to supine with Stand-by Assistance  3. Sit to stand transfer with Stand-by Assistance  4. Gait  x 200 feet with Contact Guard Assistance using Rolling Walker.                          Assessment:     Cristopher Farooq is a 67 y.o. male admitted with a medical diagnosis of <principal problem not specified>.     Patient Active Problem List   Diagnosis    Rhabdomyolysis        Rehab Prognosis: Good; patient would benefit from acute skilled PT services to address these deficits and reach maximum level of function.    Recent Surgery: * No surgery found *      Plan:     During this hospitalization, patient to be seen  6 x/week to address the identified rehab impairments via gait training, therapeutic activities, therapeutic exercises, neuromuscular re-education and progress toward the following goals:    Plan of Care Expires:  04/12/23    Billable Minutes     Billable Minutes: Gait Training 24    Treatment Type: Treatment  PT/PTA: PTA     Number of PTA visits since last PT visit: 1 03/13/2023

## 2023-03-13 NOTE — PLAN OF CARE
23 1112   Discharge Assessment   Assessment Type Discharge Planning Assessment   Confirmed/corrected address, phone number and insurance Yes   Confirmed Demographics Correct on Facesheet   Source of Information family  (Brother in-law: Kevin.)   Communicated DENNIS with patient/caregiver Date not available/Unable to determine   Reason For Admission Dx: Adult failure to thrive, Acute nontraumatic rhabdomyolysis, Bilateral lower extremity weakness, Mechanical fall-recurrent, Progressive vision loss secondary to glaucoma, Mild hyperchloremic metabolic acidosis, Severe physical deconditioning, Prophylaxis. He has increased fatigue, difficulty following simple commands, and with limited verbal communication skills   People in Home alone   Facility Arrived From: Private residence.   Do you expect to return to your current living situation? No  (Possible SNF/rehab services.)   Do you have help at home or someone to help you manage your care at home? Yes   Who are your caregiver(s) and their phone number(s)? Sister: Carlee Ochoa Oseas: [phone]; Niece: Shaina [phone].   Prior to hospitilization cognitive status: Not Oriented to Person;Not Oriented to Place;Not Oriented to Time   Current cognitive status: Not Oriented to Person;Not Oriented to Place;Not Oriented to Time   Walking or Climbing Stairs ambulation difficulty, requires equipment   Mobility Management Uses a quad cane & rollator for ambulation.   Dressing/Bathing bathing difficulty, assistance 1 person   Dressing/Bathing Management Requires ssistnce with ADls; bathing & toileting needs.   Home Layout Able to live on 1st floor   Equipment Currently Used at Home rollator;cane, quad   Readmission within 30 days? No   Patient currently being followed by outpatient case management? No   Do you currently have service(s) that help you manage your care at home? No   Do you take prescription medications? Yes   Do you have prescription coverage? Yes    Coverage Medicare  Payor:   MEDICARE - MEDICARE A ONLY   Do you have any problems affording any of your prescribed medications? No   Is the patient taking medications as prescribed? yes   Who is going to help you get home at discharge? Sister: Carlee Ochoa Oseas: [phone]; Niece: Shaina [phone].   How do you get to doctors appointments? family or friend will provide   Are you on dialysis? No   Discharge Plan A Skilled Nursing Facility   Discharge Plan B Home with family  (Plns are to live with Niece: Shaina [phone].)   DME Needed Upon Discharge  none   Discharge Plan discussed with: Friend   Name(s) and Number(s) Brother in-law: Kevin.   Discharge Barriers Identified None   Social Connections   Are you , , , , never , or living with a partner? Never marrie   Alcohol Use   Q1: How often do you have a drink containing alcohol? Never   Q2: How many drinks containing alcohol do you have on a typical day when you are drinking? None   Q3: How often do you have six or more drinks on one occasion? Never

## 2023-03-13 NOTE — NURSING
[facility] 4th Floor Medical Telemetry  Wound Care    Patient Name:  Cristopher Farooq   MRN:  [id number]  Date: 3/13/2023  Diagnosis: <principal problem not specified>    History:     No past medical history on file.    Social History     Socioeconomic History    Marital status: Single     Social Determinants of Health     Social Connections: Unknown    Marital Status: Never        Precautions:     Allergies as of 03/10/2023    (Not on File)       WOC Assessment Details/Treatment   Patient with caregiver at bedside. Patient states he fell at home. Caregiver reports patient hygiene has declined lately.       03/13/23 1440   WOCN Assessment   WOCN Total Time (mins) 30   Visit Date 03/13/23   Visit Time 1440   Consult Type New   WOCN Speciality Wound   Intervention assessed;changed;applied;orders        Altered Skin Integrity 03/10/23 1229 Right Shoulder Abrasion(s)   Date First Assessed/Time First Assessed: 03/10/23 1229   Altered Skin Integrity Present on Admission - Did Patient arrive to the hospital with altered skin?: yes  Side: Right  Location: Shoulder  Primary Wound Type: Abrasion(s)   Wound Image    Dressing Appearance Open to air   Drainage Amount Scant   Drainage Characteristics/Odor Clear;Serous   Appearance Fibrin   Tissue loss description Partial thickness   Periwound Area Intact   Wound Edges Jagged   Wound Length (cm) 3 cm   Wound Width (cm) 2.8 cm   Wound Depth (cm) 0.1 cm   Wound Volume (cm^3) 0.84 cm^3   Wound Surface Area (cm^2) 8.4 cm^2   Dressing Applied  (bordered foam)        Altered Skin Integrity 03/10/23 2200 Right lateral;posterior Hip Abrasion(s) Partial thickness tissue loss. Shallow open ulcer with a red or pink wound bed, without slough. Intact or Open/Ruptured Serum-filled blister.   Date First Assessed/Time First Assessed: 03/10/23 2200   Altered Skin Integrity Present on Admission - Did Patient arrive to the hospital with altered skin?: yes  Side: Right  Orientation:  lateral;posterior  Location: Hip  Is this injury device relate...   Wound Image    Drainage Amount None   Wound Length (cm) 0 cm  (Patient with multiple healed areas distribution and shape suggest possible insect bite not abrasions.)   Wound Width (cm) 0 cm   Wound Depth (cm) 0 cm   Wound Volume (cm^3) 0 cm^3   Wound Surface Area (cm^2) 0 cm^2        Altered Skin Integrity 03/10/23 2200 Left medial;lower Buttocks Abrasion(s) Partial thickness tissue loss. Shallow open ulcer with a red or pink wound bed, without slough. Intact or Open/Ruptured Serum-filled blister.   Date First Assessed/Time First Assessed: 03/10/23 2200   Altered Skin Integrity Present on Admission - Did Patient arrive to the hospital with altered skin?: yes  Side: Left  Orientation: medial;lower  Location: Buttocks  Primary Wound Type: Abrasion(...   Wound Image    Drainage Amount None   Appearance Intact   Wound Length (cm) 0 cm  (Patient with multiple healed areas distribution and shape suggest possible insect bite not abrasions.)   Wound Width (cm) 0 cm   Wound Depth (cm) 0 cm   Wound Volume (cm^3) 0 cm^3   Wound Surface Area (cm^2) 0 cm^2        Altered Skin Integrity 03/10/23 2200 Right anterior Foot Abrasion(s) Partial thickness tissue loss. Shallow open ulcer with a red or pink wound bed, without slough. Intact or Open/Ruptured Serum-filled blister.   Date First Assessed/Time First Assessed: 03/10/23 2200   Altered Skin Integrity Present on Admission - Did Patient arrive to the hospital with altered skin?: yes  Side: Right  Orientation: anterior  Location: Foot  Primary Wound Type: (c) Abrasion(s) ...   Wound Image    Drainage Amount None   Appearance Fibrin   Wound Length (cm) 0 cm  (No open wound, patient with poor hygiene, areas with dirt and fibrin scabs that wash off.)   Wound Width (cm) 0 cm   Wound Depth (cm) 0 cm   Wound Volume (cm^3) 0 cm^3   Wound Surface Area (cm^2) 0 cm^2        Altered Skin Integrity 03/10/23 2200 Left anterior  Foot Abrasion(s)   Date First Assessed/Time First Assessed: 03/10/23 2200   Altered Skin Integrity Present on Admission - Did Patient arrive to the hospital with altered skin?: yes  Side: Left  Orientation: anterior  Location: Foot  Primary Wound Type: Abrasion(s)   Wound Image    Wound Length (cm)   (No open wound, patient with poor hygiene, areas with dirt and fibrin scabs that wash off.)         Recommendations made to primary team for wound care and pressure relief . Orders placed.     03/13/2023

## 2023-03-14 PROBLEM — R25.8 BRADYKINESIA: Status: ACTIVE | Noted: 2023-03-14

## 2023-03-14 PROCEDURE — 92610 EVALUATE SWALLOWING FUNCTION: CPT

## 2023-03-14 PROCEDURE — 21400001 HC TELEMETRY ROOM

## 2023-03-14 PROCEDURE — 99223 PR INITIAL HOSPITAL CARE,LEVL III: ICD-10-PCS | Mod: ,,, | Performed by: PSYCHIATRY & NEUROLOGY

## 2023-03-14 PROCEDURE — 97535 SELF CARE MNGMENT TRAINING: CPT | Mod: CO

## 2023-03-14 PROCEDURE — 97116 GAIT TRAINING THERAPY: CPT | Mod: CQ

## 2023-03-14 PROCEDURE — 25000003 PHARM REV CODE 250

## 2023-03-14 PROCEDURE — 99223 1ST HOSP IP/OBS HIGH 75: CPT | Mod: ,,, | Performed by: PSYCHIATRY & NEUROLOGY

## 2023-03-14 PROCEDURE — 25000003 PHARM REV CODE 250: Performed by: PSYCHIATRY & NEUROLOGY

## 2023-03-14 PROCEDURE — 63600175 PHARM REV CODE 636 W HCPCS: Performed by: INTERNAL MEDICINE

## 2023-03-14 PROCEDURE — 97530 THERAPEUTIC ACTIVITIES: CPT | Mod: CO

## 2023-03-14 PROCEDURE — 25000003 PHARM REV CODE 250: Performed by: INTERNAL MEDICINE

## 2023-03-14 PROCEDURE — 94761 N-INVAS EAR/PLS OXIMETRY MLT: CPT

## 2023-03-14 PROCEDURE — 97110 THERAPEUTIC EXERCISES: CPT | Mod: CQ

## 2023-03-14 RX ORDER — CARBIDOPA AND LEVODOPA 25; 100 MG/1; MG/1
1 TABLET, EXTENDED RELEASE ORAL 3 TIMES DAILY
Status: DISCONTINUED | OUTPATIENT
Start: 2023-03-14 | End: 2023-03-16 | Stop reason: HOSPADM

## 2023-03-14 RX ORDER — SODIUM CHLORIDE, SODIUM LACTATE, POTASSIUM CHLORIDE, CALCIUM CHLORIDE 600; 310; 30; 20 MG/100ML; MG/100ML; MG/100ML; MG/100ML
INJECTION, SOLUTION INTRAVENOUS CONTINUOUS
Status: DISCONTINUED | OUTPATIENT
Start: 2023-03-14 | End: 2023-03-16 | Stop reason: HOSPADM

## 2023-03-14 RX ADMIN — CARBIDOPA AND LEVODOPA 1 TABLET: 25; 100 TABLET, EXTENDED RELEASE ORAL at 09:03

## 2023-03-14 RX ADMIN — CARBIDOPA AND LEVODOPA 1 TABLET: 25; 100 TABLET, EXTENDED RELEASE ORAL at 08:03

## 2023-03-14 RX ADMIN — POLYETHYLENE GLYCOL 3350 17 G: 17 POWDER, FOR SOLUTION ORAL at 09:03

## 2023-03-14 RX ADMIN — ENOXAPARIN SODIUM 40 MG: 40 INJECTION SUBCUTANEOUS at 04:03

## 2023-03-14 RX ADMIN — SODIUM CHLORIDE, POTASSIUM CHLORIDE, SODIUM LACTATE AND CALCIUM CHLORIDE: 600; 310; 30; 20 INJECTION, SOLUTION INTRAVENOUS at 04:03

## 2023-03-14 RX ADMIN — POLYETHYLENE GLYCOL 3350 17 G: 17 POWDER, FOR SOLUTION ORAL at 08:03

## 2023-03-14 NOTE — CONSULTS
[facility] - 4th Floor Medical Telemetry  Neurology  Consult Note    Patient Name: Cristopher Farooq  MRN: [id number]  Admission Date: 3/10/2023  Hospital Length of Stay: 4 days  Code Status: Full Code   Attending Provider: Barbara Barger MD   Consulting Provider: JOEL YanezCNP-BC  Primary Care Physician: Primary Doctor No  Principal Problem:<principal problem not specified>    Inpatient consult to Neurology  Consult performed by: ROLAN Yanez  Consult ordered by: Nallely Donald MD         Subjective:     Chief Complaint:       HPI:   67-year-old male with PMH glaucoma with vision loss who was admitted on 03/10 following an unwitnessed fall at home.  Patient was found on the floor lying on his right side with associated right-sided facial swelling, right-sided weakness, and confusion.  Patient was down for unknown amount of time.  Upon ED presentation patient noted to have significantly elevated CPK with negative troponins.  Patient's family reports at baseline patient ambulates without assistive devices, has good support system to assist with ADLs, lives alone, and is having recurrent falls d/t imbalance/vision issues.  Neurology was consulted on initial admission, CVA and seizure workup unrevealing.  Neurology has been reconsulted for persistent weakness, some confusion, and unclear cause.    MRI brain without negative for acute intracranial abnormalities.  EEG unrevealing for seizure activity.       No past medical history on file.    No past surgical history on file.    Review of patient's allergies indicates:   Allergen Reactions    Fish containing products     Shellfish containing products     Tramadol        Current Neurological Medications:     No current facility-administered medications on file prior to encounter.     No current outpatient medications on file prior to encounter.     Family History    None       Tobacco Use    Smoking status: Not on file    Smokeless tobacco: Not on  file   Substance and Sexual Activity    Alcohol use: Not on file    Drug use: Not on file    Sexual activity: Not on file     Review of Systems  Limited 2/2 pt's condition  Objective:     Vital Signs (Most Recent):  Temp: 98 °F (36.7 °C) (03/14/23 0805)  Pulse: 64 (03/14/23 0805)  Resp: 18 (03/14/23 0805)  BP: 106/69 (03/14/23 0805)  SpO2: 98 % (03/14/23 0511) Vital Signs (24h Range):  Temp:  [97.3 °F (36.3 °C)-98 °F (36.7 °C)] 98 °F (36.7 °C)  Pulse:  [56-73] 64  Resp:  [18] 18  SpO2:  [98 %-100 %] 98 %  BP: ()/(61-78) 106/69     Weight: 52.4 kg (115 lb 8 oz)  Body mass index is 19.22 kg/m².    Physical Exam  Constitutional:       General: He is awake.   HENT:      Head: Normocephalic and atraumatic.      Nose: Nose normal.      Mouth/Throat:      Mouth: Mucous membranes are moist.      Pharynx: Oropharynx is clear.   Eyes:      Pupils: Pupils are equal, round, and reactive to light.   Pulmonary:      Effort: Pulmonary effort is normal.   Skin:     General: Skin is warm and dry.   Psychiatric:         Speech: Speech normal.         Behavior: Behavior is cooperative.       NEUROLOGICAL EXAMINATION:     MENTAL STATUS   Oriented to person.   Oriented to place.   Oriented to year.   Follows 1 step commands.   Attention: normal. Concentration: decreased.   Speech: speech is normal   Level of consciousness: alert    CRANIAL NERVES     CN II   Visual acuity: decreased    CN III, IV, VI   Pupils are equal, round, and reactive to light.  Nystagmus: none   Conjugate gaze: present    CN V   Facial sensation intact.     CN VII   Facial expression full, symmetric.     MOTOR EXAM   Muscle bulk: decreased  Right arm tone: cogwheel rigidity  Left arm tone: increased  Right arm pronator drift: present  Left arm pronator drift: absent    Strength   Right deltoid: 4/5  Left deltoid: 4/5  Right biceps: 4/5  Left biceps: 4/5  Right triceps: 4/5  Left triceps: 4/5  Right quadriceps: 3/5  Left quadriceps: 3/5  Right hamstring:  3/5  Left hamstring: 3/5  Right anterior tibial: 4/5  Left anterior tibial: 4/5  Right posterior tibial: 4/5  Left posterior tibial: 4/5       Bradykinesia      REFLEXES     Reflexes   Right plantar: normal  Left plantar: normal  Right ankle clonus: absent  Left ankle clonus: absent    SENSORY EXAM   Light touch normal.     GAIT AND COORDINATION     Tremor   Resting tremor: absent  Intention tremor: absent  Action tremor: absent       Gait not observed     Significant Labs:   Recent Lab Results       None            Significant Imaging: I have reviewed all pertinent imaging results/findings within the past 24 hours.    Assessment and Plan:     Bradykinesia  Started on trial of sinimet 25/100 mg BID  Fall precautions          VTE Risk Mitigation (From admission, onward)         Ordered     enoxaparin injection 40 mg  Daily         03/12/23 1528     IP VTE LOW RISK PATIENT  Once         03/10/23 1437     Place sequential compression device  Until discontinued         03/10/23 1437                Thank you for your consult. Further recommendations to follow per MD Michelle Domingo, [facility]-BC  Inpatient Neurology  [facility] - 4th Floor Medical Telemetry

## 2023-03-14 NOTE — PT/OT/SLP PROGRESS
Occupational Therapy  Treatment    Cristopher Farooq   MRN: [id number]   Admitting Diagnosis: Rhabdomyolysis    OT Date of Treatment: 03/14/23   OT Start Time: 1100  OT Stop Time: 1123  OT Total Time (min): 23 min     Billable Minutes:  Self Care/Home Management 18 and Therapeutic Activity 5  Total Minutes: 23     OT/KWASI: KWASI     Number of KWASI visits since last OT visit: 1    General Precautions: Standard, fall  Orthopedic Precautions:    Braces:      Spiritual, Cultural Beliefs, Zoroastrian Practices, Values that Affect Care: no    Subjective:  Communicated with RN prior to session.  Pt awake and agreeable to OT session.      Objective:  Patient found with: peripheral IV, telemetry    Functional Mobility:  Bed Mobility:   Supine to sit: Minimal Assistance   Sit to supine: Activity did not occur   Rolling: Activity did not occur   Scooting: Minimal Assistance    Transfer Training:   Sit>stand from EOB Min A. Stand-step t/f >BS chair with Min A and cues for walker management. Also demo'd difficulty following commands requiring TC to advance feet at times.     Grooming:  Pt performed oral hygiene in standing with Min-Mod A for sequencing 2/2 decreased ability to follow commands. Pt noted with R lateral lean in standing requiring Min A for balance and return to midline.     Balance:   Static Sit: GOOD: Takes MODERATE challenges from all directions  Dynamic Sit:  GOOD-: Incosistently Maintains balance through MODERATE excursions of active trunk movement,     Static Stand: POOR+: Needs MINIMAL assist to maintain  Dynamic stand: POOR+: Needs MIN (minimal ) assist during gait    Additional Treatment:      Patient left up in chair with all lines intact, call button in reach, and family present    ASSESSMENT:  Cristopher Farooq is a 67 y.o. male with a medical diagnosis of rhabdomyolysis. Tolerated session well overall, limited by impaired cognition affection safety awareness and ability to follow commands. Assessed  strength in BUE. Very  limited strength in R hand, family reports pt has started to use LUE for tasks 2/2 weakness. Needed assistance to incorporate L hand during ADLs and while using RW.     Rehab potential is good    Activity tolerance: Good    Discharge recommendations: nursing facility, skilled     Equipment recommendations:       GOALS:   Multidisciplinary Problems       Occupational Therapy Goals          Problem: Occupational Therapy    Goal Priority Disciplines Outcome Interventions   Occupational Therapy Goal     OT, PT/OT Ongoing, Progressing    Description: Goals to be met by: 3/24/23     Patient will increase functional independence with ADLs by performing:    UE Dressing with Stand-by Assistance.  LE Dressing with Minimal Assistance, using dressing AEDs as needed.  Grooming while standing at sink with Contact Guard Assistance.  Toileting from bedside commode with Minimal Assistance for hygiene and clothing management.                          Plan:  Patient to be seen 5 x/week to address the above listed problems via self-care/home management, therapeutic activities  Plan of Care expires: 03/24/23  Plan of Care reviewed with: patient         03/14/2023

## 2023-03-14 NOTE — PROGRESS NOTES
[facility] Hospital Medicine Progress Note        Chief Complaint: Inpatient Follow-up    HPI:   67-year-old  male with significant history of glaucoma, vision loss secondary to same was brought to the family as a level 2 trauma after an unwitnessed fall at home.  Patient lives alone, but has good support system/family around who helps with his daily activities including cooking/feeding.  At baseline per family patient is able to ambulate, but has been falling a lot recently secondary to imbalance/vision issues secondary to glaucoma.  Patient was found at home on the floor on his right side with associated right-sided facial swelling, questionable right-sided weakness and increased confusion.  He was brought to the ED.  Hemodynamics were stable.  Lab significant for elevated CPK.  Troponins negative.  No acute injuries on imaging.  Hospitalist medicine services consulted and neurology services consulted given concern for CVA.  Stroke workup ordered.  Stroke workup was negative.  Echocardiogram unremarkable except for diastolic dysfunction paid bilateral lower extremity weakness noted and therefore MRI L-spine ordered.  EEG to rule out seizures given elevated CPK.  IV fluids continued.  No renal insufficiency.  Severe physical deconditioning and therefore therapy services consulted .  CT chest, abdomen, pelvis to rule out occult malignancy.  Also ordered tumor markers.  CT chest, abdomen, pelvis not impressive except for some liver lesion/renal lesions for which ultrasound abdomen was ordered to further evaluate.  Tumor markers negative.  CPK trending down on IV fluids.  Ultrasound abdomen-non impressive.  Given bradykinesia/progressive cognitive decline decided to reconsult Neurology to rule out Parkinson's disease.  MRI C-spine also was ordered.      Interval Hx:   Patient seen at bedside.  No acute events overnight.  Hemodynamics stable.  No new complaints paid  brother-in-law at bedside.      Objective/physical exam:  General: In no acute distress, afebrile  Chest: Clear to auscultation bilaterally  Heart: S1, S2, no appreciable murmur  Abdomen: Soft, nontender, BS +  MSK: Warm, no lower extremity edema, no clubbing or cyanosis  Neurologic:  Alert and awake, minimally communicative.  Global weakness  VITAL SIGNS: 24 HRS MIN & MAX LAST   Temp  Min: 97.3 °F (36.3 °C)  Max: 98 °F (36.7 °C) 98 °F (36.7 °C)   BP  Min: 96/61  Max: 120/78 106/69   Pulse  Min: 56  Max: 73  64   Resp  Min: 18  Max: 18 18   SpO2  Min: 98 %  Max: 100 % 98 %       Recent Labs   Lab 03/13/23  0424   WBC 9.3   RBC 3.90*   HGB 12.7*   HCT 36.3*   MCV 93.1   MCH 32.6   MCHC 35.0   RDW 12.9      MPV 10.1         Recent Labs   Lab 03/11/23 0452 03/13/23  0424    137   K 3.7 3.8   CO2 22* 25   BUN 13.5 8.1*   CREATININE 0.84 0.68*   CALCIUM 8.1* 8.2*   MG 1.80  --    ALBUMIN 2.7* 2.7*   ALKPHOS 69 64   ALT 27 33   AST 80* 58*   BILITOT 1.2 0.8          Microbiology Results (last 7 days)       ** No results found for the last 168 hours. **             Scheduled Med:   carbidopa-levodopa  mg  1 tablet Oral BID    enoxaparin  40 mg Subcutaneous Daily    polyethylene glycol  17 g Oral BID          Assessment/Plan:    Adult failure to thrive  Acute nontraumatic rhabdomyolysis   Global weakness  Bradykinesia with progressive cognitive decline-suspected Parkinson's disease  Mechanical fall-recurrent  Progressive vision loss secondary to glaucoma  Severe physical deconditioning   Prophylaxis    CVA ruled out , MRI brain non impressive  Ultrasound carotid negative  Echocardiogram unremarkable except for diastolic dysfunction  Patient is globally weak  Bradykinetic, also has progressive cognitive decline   MRI L-spine/MRI C-spine -degenerative changes/spondylosis   EEG normal  I reconsulted Neurology to evaluate for Parkinson's disease   They have initiated him on trial of Sinemet  Continue IV  fluids for rhabdomyolysis   CPK is pending today  Continue to monitor  No renal insufficiency  Severely physically deconditioned with failure to thrive   PT/OT on board   Recommending skilled nursing facility placement  CT chest, abdomen, pelvis with no impressive findings except for questionable cyst in kidney and liver lesions  Evaluated these with ultrasound-non impressive  Tumor markers not impressive  MiraLax for constipation  Patient is not on any scheduled meds at home.  DVT prophylaxis-Lovenox    Case management working on skilled nursing facility placement.    Nallely Donald MD   03/14/2023

## 2023-03-14 NOTE — HPI
67-year-old male with PMH glaucoma with vision loss who was admitted on 03/10 following an unwitnessed fall at home.  Patient was found on the floor lying on his right side with associated right-sided facial swelling, right-sided weakness, and confusion.  Patient was down for unknown amount of time.  Upon ED presentation patient noted to have significantly elevated CPK with negative troponins.  Patient's family reports at baseline patient ambulates without assistive devices, has good support system to assist with ADLs, lives alone, and is having recurrent falls d/t imbalance/vision issues.  Neurology was consulted on initial admission, CVA and seizure workup unrevealing.  Neurology has been reconsulted for persistent weakness, some confusion, and unclear cause.    MRI brain without negative for acute intracranial abnormalities.  EEG unrevealing for seizure activity.

## 2023-03-14 NOTE — PT/OT/SLP PROGRESS
Physical Therapy Treatment    Patient Name:  Cristopher Farooq   MRN:  [id number]    Recommendations:     Discharge Recommendations: nursing facility, basic  Discharge Equipment Recommendations: none  Barriers to discharge: Inaccessible home    Assessment:     Cristopher Farooq is a 67 y.o. male admitted with a medical diagnosis of Rhabdo.  He presents with the following impairments/functional limitations: weakness, impaired endurance, impaired functional mobility, gait instability, impaired balance.    Rehab Prognosis: Good; patient would benefit from acute skilled PT services to address these deficits and reach maximum level of function.    Recent Surgery: * No surgery found *      Plan:     During this hospitalization, patient to be seen 6 x/week to address the identified rehab impairments via gait training, therapeutic activities, therapeutic exercises and progress toward the following goals:    Plan of Care Expires:  04/12/23    Subjective     Chief Complaint: None   Patient/Family Comments/goals: None  Pain/Comfort:  None      Objective:     Pt found in bed upon entry.      General Precautions: Standard, fall  Orthopedic Precautions:    Braces: N/A  Respiratory Status: Room air     Functional Mobility:  Bed Mobility:     Supine to Sit: moderate assistance  Sit to Supine: moderate assistance  Gait: Min A with RW  Pt amb 102ft and 84ft with step through gait pattern. Pt presents with decreased KERLINE step length with LLE crossing into midline. Assistance required steering of RW.     Therapeutic Activities/Exercises:  BLE: hip flexion, LAQ, hip abd/add x 10 reps in sitting.     Patient left HOB elevated with call button in reach and son present..    GOALS:   Multidisciplinary Problems       Physical Therapy Goals          Problem: Physical Therapy    Goal Priority Disciplines Outcome Goal Variances Interventions   Physical Therapy Goal     PT, PT/OT Ongoing, Progressing     Description: Goals to be met by: 04/12/2023     Patient will  increase functional independence with mobility by performin. Supine to sit with Stand-by Assistance  2. Sit to supine with Stand-by Assistance  3. Sit to stand transfer with Stand-by Assistance  4. Gait  x 200 feet with Contact Guard Assistance using Rolling Walker.                          Time Tracking:     PT Received On: 23  PT Start Time: 855     PT Stop Time: 920  PT Total Time (min): 25 min     Billable Minutes: Gait Training 15 and Therapeutic Exercise 10    Treatment Type: Treatment  PT/PTA: PTA     Number of PTA visits since last PT visit: 2     2023

## 2023-03-14 NOTE — SUBJECTIVE & OBJECTIVE
No past medical history on file.    No past surgical history on file.    Review of patient's allergies indicates:   Allergen Reactions    Fish containing products     Shellfish containing products     Tramadol        Current Neurological Medications:     No current facility-administered medications on file prior to encounter.     No current outpatient medications on file prior to encounter.     Family History    None       Tobacco Use    Smoking status: Not on file    Smokeless tobacco: Not on file   Substance and Sexual Activity    Alcohol use: Not on file    Drug use: Not on file    Sexual activity: Not on file     Review of Systems  Limited 2/2 pt's condition  Objective:     Vital Signs (Most Recent):  Temp: 98 °F (36.7 °C) (03/14/23 0805)  Pulse: 64 (03/14/23 0805)  Resp: 18 (03/14/23 0805)  BP: 106/69 (03/14/23 0805)  SpO2: 98 % (03/14/23 0511) Vital Signs (24h Range):  Temp:  [97.3 °F (36.3 °C)-98 °F (36.7 °C)] 98 °F (36.7 °C)  Pulse:  [56-73] 64  Resp:  [18] 18  SpO2:  [98 %-100 %] 98 %  BP: ()/(61-78) 106/69     Weight: 52.4 kg (115 lb 8 oz)  Body mass index is 19.22 kg/m².    Physical Exam  Constitutional:       General: He is awake.   HENT:      Head: Normocephalic and atraumatic.      Nose: Nose normal.      Mouth/Throat:      Mouth: Mucous membranes are moist.      Pharynx: Oropharynx is clear.   Eyes:      Pupils: Pupils are equal, round, and reactive to light.   Pulmonary:      Effort: Pulmonary effort is normal.   Skin:     General: Skin is warm and dry.   Psychiatric:         Speech: Speech normal.         Behavior: Behavior is cooperative.       NEUROLOGICAL EXAMINATION:     MENTAL STATUS   Oriented to person.   Oriented to place.   Oriented to year.   Follows 1 step commands.   Attention: normal. Concentration: decreased.   Speech: speech is normal   Level of consciousness: alert    CRANIAL NERVES     CN II   Visual acuity: decreased    CN III, IV, VI   Pupils are equal, round, and reactive  to light.  Nystagmus: none   Conjugate gaze: present    CN V   Facial sensation intact.     CN VII   Facial expression full, symmetric.     MOTOR EXAM   Muscle bulk: decreased  Right arm tone: cogwheel rigidity  Left arm tone: increased  Right arm pronator drift: present  Left arm pronator drift: absent    Strength   Right deltoid: 4/5  Left deltoid: 4/5  Right biceps: 4/5  Left biceps: 4/5  Right triceps: 4/5  Left triceps: 4/5  Right quadriceps: 3/5  Left quadriceps: 3/5  Right hamstring: 3/5  Left hamstring: 3/5  Right anterior tibial: 4/5  Left anterior tibial: 4/5  Right posterior tibial: 4/5  Left posterior tibial: 4/5       Bradykinesia      REFLEXES     Reflexes   Right plantar: normal  Left plantar: normal  Right ankle clonus: absent  Left ankle clonus: absent    SENSORY EXAM   Light touch normal.     GAIT AND COORDINATION     Tremor   Resting tremor: absent  Intention tremor: absent  Action tremor: absent       Gait not observed     Significant Labs:   Recent Lab Results       None            Significant Imaging: I have reviewed all pertinent imaging results/findings within the past 24 hours.

## 2023-03-14 NOTE — PLAN OF CARE
Problem: Infection  Goal: Absence of Infection Signs and Symptoms  Outcome: Ongoing, Progressing     Problem: Adjustment to Illness (Stroke, Ischemic/Transient Ischemic Attack)  Goal: Optimal Coping  Outcome: Ongoing, Progressing     Problem: Adult Inpatient Plan of Care  Goal: Plan of Care Review  Outcome: Ongoing, Progressing  Goal: Patient-Specific Goal (Individualized)  Outcome: Ongoing, Progressing  Goal: Absence of Hospital-Acquired Illness or Injury  Outcome: Ongoing, Progressing  Goal: Optimal Comfort and Wellbeing  Outcome: Ongoing, Progressing     Problem: Fall Injury Risk  Goal: Absence of Fall and Fall-Related Injury  Outcome: Ongoing, Progressing     Problem: Skin Injury Risk Increased  Goal: Skin Health and Integrity  Outcome: Ongoing, Progressing

## 2023-03-14 NOTE — PT/OT/SLP EVAL
"Speech Language Pathology Department  Clinical Swallow Evaluation    Patient Name:  Cristopher Farooq   MRN:  [id number]    Recommendations:     General recommendations:  SLP intervention not indicated  Diet recommendations:  Easy to Chew Diet - IDDSI Level 7, Liquid Diet Level: Thin liquids - IDDSI Level 0   Swallow strategies/precautions: small bites/sips and slow rate  Precautions: Standard,      History:     Cristopher Farooq is a/n 67 y.o. male admitted unwitnessed fall at home.    No past medical history on file.  No past surgical history on file.    Home Diet: Regular and thin liquids  Current Method of Nutrition: PO diet      Patient complaint: denies    Subjective     Patient awake, alert, and oriented x4 .  Patient goals: "to get better"  Spiritual/Cultural/Hoahaoism Beliefs/Practices that affect care: no  Pain/Comfort: Pain Rating 1: 0/10    Objective:     Oral Musculature Evaluation  Dentition: present and adequate  Secretion Management: adequate    Consistency Fed By Oral Symptoms Pharyngeal Symptoms   Thin liquid by straw SLP None None   Puree SLP None None   Chewable solid SLP None None     Assessment:     No signs/sx of aspiration. Upgrade diet to easy to chew solids.     Goals:   Multidisciplinary Problems       SLP Goals       Not on file                  Patient Education:     Patient provided with verbal education regarding POC.  Understanding was verbalized.    Plan:     SLP Follow-Up:      Patient to be seen:      Plan of Care expires:     Plan of Care reviewed with:         Time Tracking:     SLP Treatment Date:    3/14/23  Speech Start Time:   1100  Speech Stop Time:      1115  Speech Total Time (min):   15    Billable minutes:  Swallow and Oral Function Evaluation, 15      03/14/2023             "

## 2023-03-14 NOTE — PLAN OF CARE
Problem: Infection  Goal: Absence of Infection Signs and Symptoms  Outcome: Ongoing, Progressing     Problem: Adjustment to Illness (Stroke, Ischemic/Transient Ischemic Attack)  Goal: Optimal Coping  Outcome: Ongoing, Progressing     Problem: Bowel Elimination Impaired (Stroke, Ischemic/Transient Ischemic Attack)  Goal: Effective Bowel Elimination  Outcome: Ongoing, Progressing     Problem: Cerebral Tissue Perfusion (Stroke, Ischemic/Transient Ischemic Attack)  Goal: Optimal Cerebral Tissue Perfusion  Outcome: Ongoing, Progressing     Problem: Cognitive Impairment (Stroke, Ischemic/Transient Ischemic Attack)  Goal: Optimal Cognitive Function  Outcome: Ongoing, Progressing     Problem: Communication Impairment (Stroke, Ischemic/Transient Ischemic Attack)  Goal: Improved Communication Skills  Outcome: Ongoing, Progressing     Problem: Swallowing Impairment (Stroke, Ischemic/Transient Ischemic Attack)  Goal: Optimal Eating and Swallowing without Aspiration  Outcome: Ongoing, Progressing     Problem: Urinary Elimination Impaired (Stroke, Ischemic/Transient Ischemic Attack)  Goal: Effective Urinary Elimination  Outcome: Ongoing, Progressing     Problem: Impaired Wound Healing  Goal: Optimal Wound Healing  Outcome: Ongoing, Progressing     Problem: Fall Injury Risk  Goal: Absence of Fall and Fall-Related Injury  Outcome: Ongoing, Progressing     Problem: Skin Injury Risk Increased  Goal: Skin Health and Integrity  Outcome: Ongoing, Progressing

## 2023-03-15 LAB
CK SERPL-CCNC: 749 U/L (ref 30–200)
LACTATE SERPL-SCNC: 1.5 MMOL/L (ref 0.5–2.2)
POCT GLUCOSE: 94 MG/DL (ref 70–110)

## 2023-03-15 PROCEDURE — 25000003 PHARM REV CODE 250: Performed by: INTERNAL MEDICINE

## 2023-03-15 PROCEDURE — 97530 THERAPEUTIC ACTIVITIES: CPT | Mod: CQ

## 2023-03-15 PROCEDURE — 83605 ASSAY OF LACTIC ACID: CPT | Performed by: INTERNAL MEDICINE

## 2023-03-15 PROCEDURE — 82550 ASSAY OF CK (CPK): CPT | Performed by: INTERNAL MEDICINE

## 2023-03-15 PROCEDURE — 87040 BLOOD CULTURE FOR BACTERIA: CPT | Performed by: INTERNAL MEDICINE

## 2023-03-15 PROCEDURE — 63600175 PHARM REV CODE 636 W HCPCS: Performed by: INTERNAL MEDICINE

## 2023-03-15 PROCEDURE — 25000003 PHARM REV CODE 250: Performed by: PSYCHIATRY & NEUROLOGY

## 2023-03-15 PROCEDURE — 97116 GAIT TRAINING THERAPY: CPT | Mod: CQ

## 2023-03-15 PROCEDURE — 21400001 HC TELEMETRY ROOM

## 2023-03-15 RX ORDER — MIDODRINE HYDROCHLORIDE 5 MG/1
10 TABLET ORAL 2 TIMES DAILY WITH MEALS
Status: DISCONTINUED | OUTPATIENT
Start: 2023-03-15 | End: 2023-03-16 | Stop reason: HOSPADM

## 2023-03-15 RX ADMIN — CARBIDOPA AND LEVODOPA 1 TABLET: 25; 100 TABLET, EXTENDED RELEASE ORAL at 08:03

## 2023-03-15 RX ADMIN — SODIUM CHLORIDE, POTASSIUM CHLORIDE, SODIUM LACTATE AND CALCIUM CHLORIDE: 600; 310; 30; 20 INJECTION, SOLUTION INTRAVENOUS at 11:03

## 2023-03-15 RX ADMIN — POLYETHYLENE GLYCOL 3350 17 G: 17 POWDER, FOR SOLUTION ORAL at 08:03

## 2023-03-15 RX ADMIN — ENOXAPARIN SODIUM 40 MG: 40 INJECTION SUBCUTANEOUS at 04:03

## 2023-03-15 RX ADMIN — MIDODRINE HYDROCHLORIDE 10 MG: 5 TABLET ORAL at 04:03

## 2023-03-15 RX ADMIN — CARBIDOPA AND LEVODOPA 1 TABLET: 25; 100 TABLET, EXTENDED RELEASE ORAL at 02:03

## 2023-03-15 RX ADMIN — SODIUM CHLORIDE, POTASSIUM CHLORIDE, SODIUM LACTATE AND CALCIUM CHLORIDE: 600; 310; 30; 20 INJECTION, SOLUTION INTRAVENOUS at 05:03

## 2023-03-15 RX ADMIN — SODIUM CHLORIDE, POTASSIUM CHLORIDE, SODIUM LACTATE AND CALCIUM CHLORIDE: 600; 310; 30; 20 INJECTION, SOLUTION INTRAVENOUS at 02:03

## 2023-03-15 NOTE — PT/OT/SLP PROGRESS
Physical Therapy Treatment    Patient Name:  Cristopher Farooq   MRN:  [id number]    Recommendations:     Discharge Recommendations: nursing facility, skilled  Discharge Equipment Recommendations: none  Barriers to discharge:  Severity of deficits    Assessment:     Cristopher Farooq is a 67 y.o. male admitted with a medical diagnosis of Adult failure to thrive, Acute nontraumatic rhabdomyolysis, Bilateral lower extremity weakness, Mechanical fall-recurrent, Progressive vision loss secondary to glaucoma, Mild hyperchloremic metabolic acidosis, Severe physical deconditioning.  He presents with the following impairments/functional limitations: weakness, impaired endurance, impaired functional mobility, gait instability, impaired balance, impaired cognition, decreased upper extremity function, impaired self care skills. Pt is slower to respond to questions and sometimes doesn't respond at all. R arm and hand more rigid with noted R lateral lean in sitting and amb. Pt also appears sleepy and presents with less endurance compared to previous tx session. Pt also required increased assistance with steering and advancing RW compared to yesterdays tx session. Pt seen approx 1.5 hrs post sinemet administration.     Rehab Prognosis: Good; patient would benefit from acute skilled PT services to address these deficits and reach maximum level of function.    Recent Surgery: * No surgery found *      Plan:     During this hospitalization, patient to be seen 6 x/week to address the identified rehab impairments via gait training, therapeutic activities, therapeutic exercises and progress toward the following goals:    Plan of Care Expires:  04/12/23    Subjective     Chief Complaint: None  Patient/Family Comments/goals: None  Pain/Comfort:  None      Objective:     Pt found in bed upon entry.      General Precautions: Standard, fall  Orthopedic Precautions:    Braces: N/A  Respiratory Status: Room air     Functional Mobility:  Bed Mobility:      Supine to Sit: moderate assistance  Sit to Supine: moderate assistance  Transitional Sit-to-stand: Min A with RW  Static Standing: Min A with RW  Pt stood EOB while being cleaned and brief donned   Gait: Min/Mod A with RW  Pt amb 84ft X 2 with step through gait pattern. Pt presents with NBOS with LLE crossing into midline. Pt presents with R lateral lean with R lateral excursion of RW. Assistance to steer and advance RW.     Patient left HOB elevated with call button in reach and brother-in-law present..    GOALS:   Multidisciplinary Problems       Physical Therapy Goals          Problem: Physical Therapy    Goal Priority Disciplines Outcome Goal Variances Interventions   Physical Therapy Goal     PT, PT/OT Ongoing, Progressing     Description: Goals to be met by: 2023     Patient will increase functional independence with mobility by performin. Supine to sit with Stand-by Assistance  2. Sit to supine with Stand-by Assistance  3. Sit to stand transfer with Stand-by Assistance  4. Gait  x 200 feet with Contact Guard Assistance using Rolling Walker.                          Time Tracking:     PT Received On: 03/15/23  PT Start Time: 934     PT Stop Time: 1006  PT Total Time (min): 32 min     Billable Minutes: Gait Training 15 and Therapeutic Activity 17    Treatment Type: Treatment  PT/PTA: PTA     Number of PTA visits since last PT visit: 3     03/15/2023

## 2023-03-15 NOTE — PT/OT/SLP DISCHARGE
Speech Language Pathology Discharge Summary    Cristopher Farooq  MRN: [id number]   <principal problem not specified>   Pt tolerating diet without clinical signs/sx of aspiration. No skilled SLP intervention is warranted at this time. Please reconsult as warranted.

## 2023-03-15 NOTE — PLAN OF CARE
Spoke to sister Carlee (POA) about skilled nursing facility (SNF) via phone. She does not want anything in a nursing home setting but is agreeable to [facility] (TC[facility]). Referral sent to TCU via [employer].

## 2023-03-15 NOTE — PROGRESS NOTES
[facility] Hospital Medicine Progress Note        Chief Complaint: Inpatient Follow-up    HPI:   67-year-old  male with significant history of glaucoma, vision loss secondary to same was brought to the family as a level 2 trauma after an unwitnessed fall at home.  Patient lives alone, but has good support system/family around who helps with his daily activities including cooking/feeding.  At baseline per family patient is able to ambulate, but has been falling a lot recently secondary to imbalance/vision issues secondary to glaucoma.  Patient was found at home on the floor on his right side with associated right-sided facial swelling, questionable right-sided weakness and increased confusion.  He was brought to the ED.  Hemodynamics were stable.  Lab significant for elevated CPK.  Troponins negative.  No acute injuries on imaging.  Hospitalist medicine services consulted and neurology services consulted given concern for CVA.  Stroke workup ordered.  Stroke workup was negative.  Echocardiogram unremarkable except for diastolic dysfunction paid bilateral lower extremity weakness noted and therefore MRI L-spine ordered.  EEG to rule out seizures given elevated CPK.  IV fluids continued.  No renal insufficiency.  Severe physical deconditioning and therefore therapy services consulted .  CT chest, abdomen, pelvis to rule out occult malignancy.  Also ordered tumor markers.  CT chest, abdomen, pelvis not impressive except for some liver lesion/renal lesions for which ultrasound abdomen was ordered to further evaluate.  Tumor markers negative.  CPK trending down on IV fluids.  Ultrasound abdomen-non impressive.  Given bradykinesia/progressive cognitive decline decided to reconsult Neurology to rule out Parkinson's disease.  MRI C-spine also was ordered.  MRI C-spine only with degenerated changes.  Neurology reconsulted to evaluate for possible Parkinson's disease given  bradykinesia/cognitive decline.  They initiated him on Sinemet on 03/14 . CPK trending down on IV fluids which were continued.      Interval Hx:   Patient seen at bedside.  He was starting to work with therapy at the time of my rounds.  Patient is less responsive, more rigid and less communicative after the initiation of Sinemet.  Also having intermittent hypotensive spells.      Objective/physical exam:  General: In no acute distress, afebrile  Chest: Clear to auscultation bilaterally  Heart: S1, S2, no appreciable murmur  Abdomen: Soft, nontender, BS +  MSK: Warm, no lower extremity edema, no clubbing or cyanosis  Neurologic:  Alert and awake, minimally communicative.  Global weakness  VITAL SIGNS: 24 HRS MIN & MAX LAST   Temp  Min: 97.3 °F (36.3 °C)  Max: 98.2 °F (36.8 °C) 97.5 °F (36.4 °C)   BP  Min: 81/41  Max: 130/74 130/74   Pulse  Min: 57  Max: 73  (!) 57   Resp  Min: 18  Max: 18 18   SpO2  Min: 91 %  Max: 99 % 99 %       Recent Labs   Lab 03/13/23  0424   WBC 9.3   RBC 3.90*   HGB 12.7*   HCT 36.3*   MCV 93.1   MCH 32.6   MCHC 35.0   RDW 12.9      MPV 10.1         Recent Labs   Lab 03/11/23  0452 03/13/23  0424    137   K 3.7 3.8   CO2 22* 25   BUN 13.5 8.1*   CREATININE 0.84 0.68*   CALCIUM 8.1* 8.2*   MG 1.80  --    ALBUMIN 2.7* 2.7*   ALKPHOS 69 64   ALT 27 33   AST 80* 58*   BILITOT 1.2 0.8          Microbiology Results (last 7 days)       ** No results found for the last 168 hours. **             Scheduled Med:   carbidopa-levodopa  mg  1 tablet Oral TID    enoxaparin  40 mg Subcutaneous Daily    polyethylene glycol  17 g Oral BID          Assessment/Plan:    Hypotension  Adult failure to thrive  Acute nontraumatic rhabdomyolysis   Global weakness  Bradykinesia with progressive cognitive decline-suspected Parkinson's disease  Mechanical fall-recurrent  Progressive vision loss secondary to glaucoma  Severe physical deconditioning   Prophylaxis    CVA ruled out , MRI brain non  impressive  Ultrasound carotid negative  Echocardiogram unremarkable except for diastolic dysfunction  Patient is globally weak  Bradykinetic, also has progressive cognitive decline   MRI L-spine/MRI C-spine -degenerative changes/spondylosis   EEG normal  Given concern for Parkinson's disease neurology was reconsulted 3/14, they have initiated him on Sinemet  However after initiation of Sinemet patient has worsening symptoms   Less communicative, more rigid  I have informed neurologist regarding this   Recommends to continue Sinemet   Neurologist will re-evaluate him today  CPK trended down to less than 1000   Recurrent hypotensive episodes today   Unclear etiology   Gave him 500 mL x2 bolus  Initiated by mouth midodrine,  Obtain blood cultures x2, lactic acid  Continue maintenance IV fluids  Afebrile with no leukocytosis per most recent labs  Severely physically deconditioned with failure to thrive   PT/OT on board   Recommending skilled nursing facility placement  CT chest, abdomen, pelvis with no impressive findings except for questionable cyst in kidney and liver lesions  Evaluated these with ultrasound-non impressive  Tumor markers not impressive  MiraLax for constipation  Patient is not on any scheduled meds at home.  DVT prophylaxis-Lovenox    Critical care time-35 minutes  Critical care diagnosis-hypotension requiring IV fluid bolus   Critical care interventions: Hands-on evaluation, review of labs/radiographs/records and discussions with patient's family    Case management working on skilled nursing facility placement.  Discussed with  today, planning for TCU per family request once medically stable    Nallely Donald MD   03/15/2023

## 2023-03-15 NOTE — CONSULTS
Inpatient Nutrition Assessment    Admit Date: 3/10/2023   Total duration of encounter: 5 days     Nutrition Recommendation/Prescription     Continue diet w/ texture per SLP.     Consider MVI.     Boost Plus TID. 360 kcals and 14 g protein per serving.     Encourage nutrient dense foods, less full-fugar soda.      Weekly standing weights.     Give carbidopa-levodopa 1 hour or more before meals, or 3 hours or more after meals.     Communication of Recommendations: reviewed with patient/caregiver and reviewed with nurse    Nutrition Assessment     Malnutrition Assessment/Nutrition-Focused Physical Exam    Malnutrition in the context of chronic illness  Degree of Malnutrition: non-severe (moderate) malnutrition  Energy Intake: </= 75% of estimated energy requirement for >/= 1 month  Interpretation of Weight Loss: >7.5% in 3 months  Body Fat: mild depletion  Area of Body Fat Loss: orbital region  and upper arm region - triceps / biceps  Muscle Mass Loss: mild depletion  Area of Muscle Mass Loss: temple region - temporalis muscle, clavicle bone region - pectoralis major, deltoid, trapezius muscles, dorsal hand - interosseous musle, and patellar region - quadricep muscle  Fluid Accumulation: does not meet criteria  Edema: does not meet criteria  Reduced  Strength: unable to obtain  A minimum of two characteristics is recommended for diagnosis of either severe or non-severe malnutrition.    Chart Review    Reason Seen: malnutrition screening tool (MST) and physician consult for decline in oral intake    Malnutrition Screening Tool Results   Have you recently lost weight without trying?: Unsure  Have you been eating poorly because of a decreased appetite?: Yes   MST Score: 3     Diagnosis:  Adult failure to thrive  Acute nontraumatic rhabdomyolysis   Bilateral lower extremity weakness   Mechanical fall-recurrent  Progressive vision loss secondary to glaucoma  Mild hyperchloremic metabolic acidosis  Severe physical  deconditioning   Prophylaxis  3/15/23 New: Suspected Parkinson's Disease    Relevant Medical History: glaucoma, vision loss     Nutrition-Related Medications: miralax, lactated ringers, carbidopa-levodopa  Calorie Containing IV Medications: no significant kcals from medications at this time    Nutrition-Related Labs:  3/11: Cl 109, Ca 8.1, Alb 2.7, AST 80   3/31: BUN 8.1, Cr 0.68, Ca 8.2, Alb 2.7, AST 58      Diet/PN Order: Diet Easy to Chew  Oral Supplement Order: none  Tube Feeding Order: none  Appetite/Oral Intake: poor/25-50% of meals  Factors Affecting Nutritional Intake: impaired cognitive status/motor control  Food/Congregational/Cultural Preferences: none reported  Food Allergies: shellfish    Skin Integrity: abrasion, blister  Wound(s):      Altered Skin Integrity 03/10/23 1229 Right Shoulder Abrasion(s)-Tissue loss description: Partial thickness       Altered Skin Integrity 03/10/23 1242 Right Cheek Contusion-Tissue loss description: Not applicable       Altered Skin Integrity 03/10/23 1242 Right dorsal Elbow Contusion-Tissue loss description: Partial thickness       Altered Skin Integrity 03/10/23 1243 Right anterior Knee Contusion-Tissue loss description: Partial thickness       Altered Skin Integrity 03/10/23 2200 Right lateral;posterior Hip Abrasion(s) Partial thickness tissue loss. Shallow open ulcer with a red or pink wound bed, without slough. Intact or Open/Ruptured Serum-filled blister.-Tissue loss description: Partial thickness       Altered Skin Integrity 03/10/23 2200 Right anterior Foot Abrasion(s) Partial thickness tissue loss. Shallow open ulcer with a red or pink wound bed, without slough. Intact or Open/Ruptured Serum-filled blister.-Tissue loss description: Partial thickness       Altered Skin Integrity 03/10/23 2200 Left anterior Foot Abrasion(s)-Tissue loss description: Partial thickness       Altered Skin Integrity 03/10/23 2200 Right lateral Ankle Abrasion(s) Partial thickness tissue loss.  "Shallow open ulcer with a red or pink wound bed, without slough. Intact or Open/Ruptured Serum-filled blister.-Tissue loss description: Partial thickness     Comments    3/11/23: Pt did not communicate much, has some AMS going on, his sister reports that his appetite has been becoming gradually more inconsistent over recent months and was much worse over the past 2 weeks, also became more forgetful over that time. He has lost about 14 lbs (~11%) unintentionally since December, sister and other family provide food for the patient, patient drinks up to 2 liters of full sugar soda per day. We discussed that this is likely displacing more useful/nutrient-dense source of calories in the diet.     3/15/23: Family reports po intake has improved substantially, eating 75% consistently, drinking Boost, tolerating diet.     Anthropometrics    Height: 5' 5" (165.1 cm)    Last Weight: 52.4 kg (115 lb 8 oz) (03/10/23 2200) Weight Method: Bed Scale  BMI (Calculated): 19.2  BMI Classification: underweight (BMI less than 22 if >65 years of age)        Ideal Body Weight (IBW), Male: 136 lb     % Ideal Body Weight, Male (lb): 84.93 %                          Usual Weight Provided By: family/caregiver  12/1/22: 130 lbs  Wt Readings from Last 5 Encounters:   03/10/23 52.4 kg (115 lb 8 oz)     Weight Change(s) Since Admission:  Admit Weight: 63.5 kg (140 lb) (03/10/23 1225)  3/10/23: 116 lbs    Estimated Needs    Weight Used For Calorie Calculations: 52.4 kg (115 lb 8.3 oz)  Energy Calorie Requirements (kcal): 9719-0697 kcals (35-40 kcals/kg)  Energy Need Method: Kcal/kg  Weight Used For Protein Calculations: 52.4 kg (115 lb 8.3 oz)  Protein Requirements: 70-95 g (1.5-1.8 g/kg)  Fluid Requirements (mL): 7532-2602 ml (1 ml/kcal)  Temp: 97.8 °F (36.6 °C)       Enteral Nutrition    Patient not receiving enteral nutrition at this time.    Parenteral Nutrition    Patient not receiving parenteral nutrition support at this time.    Evaluation " of Received Nutrient Intake    Calories: meeting estimated needs  Protein: meeting estimated needs    Patient Education    Not applicable.    Nutrition Diagnosis     PES: Malnutrition related to energy intake < energy demand, poor nutrition as evidenced by decreased po intake, unintentional weight loss, muscle and fat losses, consumes 700-800 kcals per day from soda per family. (new)    Interventions/Goals     Intervention(s): general/healthful diet, commercial beverage, multivitamin/mineral supplement therapy, prescription medication, and collaboration with other providers  Goal: Meet greater than 75% of nutritional needs by follow-up. (new)    Monitoring & Evaluation     Dietitian will monitor food and beverage intake, weight, and electrolyte/renal panel.  Nutrition Risk/Follow-Up: high (follow-up in 1-4 days)   Please consult if re-assessment needed sooner.

## 2023-03-15 NOTE — PLAN OF CARE
Problem: Infection  Goal: Absence of Infection Signs and Symptoms  Outcome: Ongoing, Progressing     Problem: Adjustment to Illness (Stroke, Ischemic/Transient Ischemic Attack)  Goal: Optimal Coping  Outcome: Ongoing, Progressing     Problem: Bowel Elimination Impaired (Stroke, Ischemic/Transient Ischemic Attack)  Goal: Effective Bowel Elimination  Outcome: Ongoing, Progressing

## 2023-03-15 NOTE — PT/OT/SLP PROGRESS
Attempted OT session x2 today, both times pt very lethargic and hard to stay awake. BP 91/55. RN notified, will f/u as appropriate.

## 2023-03-16 VITALS
WEIGHT: 115.5 LBS | TEMPERATURE: 98 F | HEIGHT: 65 IN | HEART RATE: 67 BPM | DIASTOLIC BLOOD PRESSURE: 57 MMHG | SYSTOLIC BLOOD PRESSURE: 94 MMHG | RESPIRATION RATE: 18 BRPM | BODY MASS INDEX: 19.24 KG/M2 | OXYGEN SATURATION: 97 %

## 2023-03-16 LAB
CK SERPL-CCNC: 377 U/L (ref 30–200)
SARS-COV-2 RDRP RESP QL NAA+PROBE: NEGATIVE

## 2023-03-16 PROCEDURE — 25000003 PHARM REV CODE 250: Performed by: PSYCHIATRY & NEUROLOGY

## 2023-03-16 PROCEDURE — 97530 THERAPEUTIC ACTIVITIES: CPT | Mod: CQ

## 2023-03-16 PROCEDURE — 82550 ASSAY OF CK (CPK): CPT | Performed by: INTERNAL MEDICINE

## 2023-03-16 PROCEDURE — 97116 GAIT TRAINING THERAPY: CPT | Mod: CQ

## 2023-03-16 PROCEDURE — 97535 SELF CARE MNGMENT TRAINING: CPT | Mod: CO

## 2023-03-16 PROCEDURE — 25000003 PHARM REV CODE 250: Performed by: INTERNAL MEDICINE

## 2023-03-16 PROCEDURE — 63600175 PHARM REV CODE 636 W HCPCS: Performed by: INTERNAL MEDICINE

## 2023-03-16 PROCEDURE — 87635 SARS-COV-2 COVID-19 AMP PRB: CPT | Performed by: HOSPITALIST

## 2023-03-16 RX ORDER — SODIUM CHLORIDE, SODIUM LACTATE, POTASSIUM CHLORIDE, CALCIUM CHLORIDE 600; 310; 30; 20 MG/100ML; MG/100ML; MG/100ML; MG/100ML
INJECTION, SOLUTION INTRAVENOUS CONTINUOUS
Status: CANCELLED | OUTPATIENT
Start: 2023-03-16 | End: 2023-03-16

## 2023-03-16 RX ORDER — ENOXAPARIN SODIUM 100 MG/ML
40 INJECTION SUBCUTANEOUS EVERY 24 HOURS
Status: CANCELLED | OUTPATIENT
Start: 2023-03-16

## 2023-03-16 RX ORDER — CARBIDOPA AND LEVODOPA 25; 100 MG/1; MG/1
1 TABLET, EXTENDED RELEASE ORAL 3 TIMES DAILY
Status: CANCELLED | OUTPATIENT
Start: 2023-03-16

## 2023-03-16 RX ORDER — POLYETHYLENE GLYCOL 3350 17 G/17G
17 POWDER, FOR SOLUTION ORAL 2 TIMES DAILY
Status: CANCELLED | OUTPATIENT
Start: 2023-03-16

## 2023-03-16 RX ORDER — MIDODRINE HYDROCHLORIDE 5 MG/1
10 TABLET ORAL 2 TIMES DAILY WITH MEALS
Status: CANCELLED | OUTPATIENT
Start: 2023-03-16

## 2023-03-16 RX ADMIN — CARBIDOPA AND LEVODOPA 1 TABLET: 25; 100 TABLET, EXTENDED RELEASE ORAL at 08:03

## 2023-03-16 RX ADMIN — SODIUM CHLORIDE, POTASSIUM CHLORIDE, SODIUM LACTATE AND CALCIUM CHLORIDE: 600; 310; 30; 20 INJECTION, SOLUTION INTRAVENOUS at 04:03

## 2023-03-16 RX ADMIN — MIDODRINE HYDROCHLORIDE 10 MG: 5 TABLET ORAL at 08:03

## 2023-03-16 RX ADMIN — POLYETHYLENE GLYCOL 3350 17 G: 17 POWDER, FOR SOLUTION ORAL at 08:03

## 2023-03-16 NOTE — DISCHARGE SUMMARY
[facility] Medicine Discharge Summary    Admit Date: 3/10/2023  Discharge Date and Time: 3/16/883404:22 AM  Admitting Physician: Hospitalist team   Discharging Physician: Zaki Jim MD.  Primary Care Physician: Primary Doctor No      Discharge Diagnoses:  Hypotension  Adult failure to thrive  Acute nontraumatic rhabdomyolysis   Global weakness  Bradykinesia with progressive cognitive decline-suspected Parkinson's disease  Mechanical fall-recurrent  Progressive vision loss secondary to glaucoma  Severe physical deconditioning     Hospital Course:   67-year-old  male with significant history of glaucoma, vision loss secondary to same was brought to the family as a level 2 trauma after an unwitnessed fall at home.  Patient lives alone, but has good support system/family around who helps with his daily activities including cooking/feeding.  At baseline per family patient is able to ambulate, but has been falling a lot recently secondary to imbalance/vision issues secondary to glaucoma.  Patient was found at home on the floor on his right side with associated right-sided facial swelling, questionable right-sided weakness and increased confusion.  He was brought to the ED.  Hemodynamics were stable.  Lab significant for elevated CPK.  Troponins negative.  No acute injuries on imaging.  Hospitalist medicine services consulted and neurology services consulted given concern for CVA.  Stroke workup ordered.  Stroke workup was negative.  Echocardiogram unremarkable except for diastolic dysfunction paid bilateral lower extremity weakness noted and therefore MRI L-spine ordered.  EEG to rule out seizures given elevated CPK.  IV fluids continued.  No renal insufficiency.  Severe physical deconditioning and therefore therapy services consulted .  CT chest, abdomen, pelvis to rule out occult malignancy.  Also ordered tumor markers.  CT chest, abdomen, pelvis not impressive except  "for some liver lesion/renal lesions for which ultrasound abdomen was ordered to further evaluate.  Tumor markers negative.  CPK trending down on IV fluids.  Ultrasound abdomen-non impressive.  Given bradykinesia/progressive cognitive decline decided to reconsult Neurology to rule out Parkinson's disease.  MRI C-spine also was ordered.  MRI C-spine only with degenerated changes.  Neurology reconsulted to evaluate for possible Parkinson's disease given bradykinesia/cognitive decline.  They initiated him on Sinemet on 03/14 . CPK trending down on IV fluids which were continued. He had progressive improvement in mobility with Sinemet but some resultant hypotension.  Was started on Midodrine for pressure support.  Still globally weak.  He will be going to SNF for further conditioning.  Hopefully with further rehab he'll be able to come off midodrine.  Accepted to TCU today.  Will need close follow up with neurology in 2 - 3 weeks       Vitals:  Blood pressure 101/64, pulse (!) 57, temperature 97.7 °F (36.5 °C), temperature source Oral, resp. rate 18, height 5' 5" (1.651 m), weight 52.4 kg (115 lb 8 oz), SpO2 99 %..    Physical Exam:  Awake, Alert, Oriented x 3, No new focal Neurologic deficit, Normal Affect  NC/AT, PERRLA, EOMI  Supple neck, no JVD, No cervical lymphadenopathy  Symmetrical chest, Good air entry bilaterally. No rhonchi, wheezes, crackles appreciated  RRR, No gallop, rub or murmur  +ve Bowel sounds x4, Abd soft and non tender, no rebound, guarding or rigidity  No Cyanosis, cludding or edema, No new rash or bruises    Procedures Performed: No admission procedures for hospital encounter.     Significant Diagnostic Studies: See Full reports for all details  No results displayed because visit has over 200 results.           Microbiology Results (last 7 days)       Procedure Component Value Units Date/Time    Blood Culture [[id number]] Collected: 03/15/23 1923    Order Status: Resulted Specimen: Blood from Arm, " Left Updated: 03/15/23 1950    Blood Culture [[id number]] Collected: 03/15/23 1928    Order Status: Resulted Specimen: Blood from Arm, Right Updated: 03/15/23 1950             X-Ray Chest 1 View    Result Date: 3/10/2023  EXAMINATION: XR CHEST 1 VIEW CLINICAL HISTORY: Injury, unspecified, initial encounter TECHNIQUE: AP chest COMPARISON: None. FINDINGS: The heart is normal in size.  There is no focal airspace consolidation.  There is no pleural effusion or definite visible pneumothorax.     No acute abnormality of the chest. Electronically signed by: Agatha Buenrostro Date:    03/10/2023 Time:    13:16    X-ray Shoulder 2 or More Views Right    Result Date: 3/10/2023  EXAMINATION: XR SHOULDER COMPLETE 2 OR MORE VIEWS RIGHT CLINICAL HISTORY: fall onto right side, shoulder pain; COMPARISON: None. FINDINGS: There is no acute fracture or malalignment.  The soft tissues are unremarkable.     No acute bony abnormality. Electronically signed by: Agatha Buenrostro Date:    03/10/2023 Time:    14:12    X-Ray Forearm Right    Result Date: 3/10/2023  EXAMINATION: XR FOREARM RIGHT CLINICAL HISTORY: , Injury, unspecified, initial encounter. FINDINGS: No acute fractures or subluxations are identified.   No radiopaque foreign body is present.     No acute fractures or subluxations are identified. Electronically signed by: Conor Carcamo Date:    03/10/2023 Time:    14:03    CT Head Without Contrast    Result Date: 3/10/2023  EXAMINATION: CT HEAD WITHOUT CONTRAST CLINICAL HISTORY: Neuro deficit, acute, stroke suspected;right hemiparesis, closed head injury; TECHNIQUE: Multiple axial images were obtained from the base of the brain to the vertex without contrast administration.  Sagittal and coronal reconstructions were performed..Automatic exposure control is utilized to reduce patient radiation exposure. COMPARISON: None FINDINGS: There is no intracranial mass or lesion seen.  No hemorrhage is seen.  No acute infarct is seen.   There is some cerebral atrophy seen.  There is some compensatory ventricular dilatation and periventricular white matter changes consistent with the patient's age.  Calvarium is intact.  The posterior fossa is unremarkable..  The visualized portions of the paranasal sinuses show no acute abnormality.     Chronic age-related changes.  No acute process Electronically signed by: Nona Fernández Date:    03/10/2023 Time:    13:18    CT Cervical Spine Without Contrast    Result Date: 3/10/2023  EXAMINATION: CT CERVICAL SPINE WITHOUT CONTRAST CLINICAL HISTORY: Trauma. TECHNIQUE: Multidetector axial images were performed of the cervical spine without and.  Images were reconstructed. Automated exposure control was utilized to minimize radiation dose.  DLP 1415. COMPARISON: None available. FINDINGS: Cervical vertebrae stature is maintained and alignment is unremarkable.  No acute fracture or malalignment identified.  There are degenerative changes which cause ventral impression upon the thecal sac and narrowings of the neural foramen.  There is no prevertebral soft tissue prominence. This study does not exclude the possibility of intrathecal soft tissue, ligamentous or vascular injury.     No acute fracture or malalignment identified. Electronically signed by: Los Crenshaw Date:    03/10/2023 Time:    13:21    MRI Brain Without Contrast    Result Date: 3/10/2023  EXAMINATION: MRI BRAIN WITHOUT CONTRAST CLINICAL HISTORY: Stroke, follow up; TECHNIQUE: Multiplanar multisequence MR imaging of the brain was performed without contrast. COMPARISON: None FINDINGS: No intracranial mass or lesion is seen.  No hemorrhage is seen.  No acute infarct is seen.  No diffusion abnormality seen.  There is diffuse cerebral atrophy seen along with some compensatory ventricular dilatation and periventricular white matter change consistent with patient's age.  Posterior fossa appears normal.  Calvarium is intact.  Paranasal sinuses appear  grossly unremarkable.     Chronic age related changes Otherwise unremarkable Electronically signed by: Nnoa Fernández Date:    03/10/2023 Time:    15:41    MRI Cervical Spine Without Contrast    Result Date: 3/14/2023  EXAMINATION: MRI CERVICAL SPINE WITHOUT CONTRAST CLINICAL HISTORY: RUE weakness; TECHNIQUE: Multiple MRI pulse sequences were performed of the cervical spine without contrast. COMPARISON: CT of the cervical spine March 10, 2023. FINDINGS: Cervical vertebrae stature preserved and the alignment is unremarkable.  There are no acute marrow edematous changes.  There are mild interspinous ligamentous edematous changes at C4-C5 and C5-C6.  Cervical cord images are partially degraded by artifacts without definite cord edema or myelomalacia.  Disc segmental analysis is given below: At C2-C3, disc height is preserved.  Central canal is not stenosed.  Right neural foramen is unremarkable.  Mild narrowing of the left neural foramen is caused by uncovertebral and facet arthropathy. At C3-C4, disc height is preserved.  Central canal is not stenosed.  There is bilateral uncovertebral and facet arthropathy.  There is no significant narrowing of the right neural foramen.  There is mild spondylotic narrowing of the left neural canal. At C4-C5, there is mild osteophyte disc complex which slightly indents the ventral thecal sac.  Cord is not compressed.  There is bilateral uncovertebral and facet arthropathy.  This results in mild narrowing of the right neural foramen.  The left neural foramen is patent. At C5-C6, there is osteophyte disc complex which indents the ventral thecal sac without causing cord compression.  There is moderate spondylotic narrowing of the right neural foramen and mild narrowing of left neural canal. At C6-C7, disc height is preserved.  Central canal is not stenosed.  There is moderate to marked narrowing of the right neural foramen.  There is mild spondylotic narrowing of the left neural  foramen. At C7-T1, disc height is preserved.  Central canal is not stenosed.  Right neural foramen is unremarkable.  There is mild spondylotic narrowing of the left neural foramen     1. Cervical spine degenerative disc disease and spondylosis level by level discussed above. 2. C4-C5 and C5-C6 interspinous ligamentous mild edematous changes. Electronically signed by: Los Crenshaw Date:    03/14/2023 Time:    13:34    MRI Lumbar Spine Without Contrast    Result Date: 3/11/2023  EXAMINATION: MRI LUMBAR SPINE WITHOUT CONTRAST TECHNIQUE: BLE Weakness; COMPARISON: None available FINDINGS: For the purpose of this report, the most inferior well developed intervertebral disc space is presumed to represent L5-S1.  Lumbar vertebrae height is unremarkable and the alignment is well preserved.  There are no acute marrow edematous changes.  The L3-L4, L4-L5 and L5-S1 discs are desiccated. The visualized thoracic cord is unremarkable. The conus medullaris terminates at T12.  Right kidney cortical T2 weighted hyperintense cystic structure disc segmental analysis is given below: At L1-L2, disc height is preserved.  Central canal is not stenosed and there are no narrowings of the neural foramen. At L2-L3, disc height is unremarkable.  Bilateral mild facet arthropathy without significant central canal stenosis.  There are no narrowings of the neural foramen. At L3-L4, disc height is preserved.  Moderate central canal stenosis is caused by facet arthropathy and ligamentum flavum thickening.  There are no narrowings of the neural foramen. At L4-L5, there is marked central canal stenosis caused by thickening of ligamentum flavum and facet degenerative hypertrophy.  There are no narrowings of the neural foramen. At L5-S1, there is bulging of annulus fibrosis which slightly indents the ventral thecal sac.  Bilateral facet arthropathy which is more pronounced on the right and causes posterolateral compression upon the thecal sac.  Central  canal stenosis is mild.  Facet arthropathy also dorsally encroaches onto the right neural foramen causing moderate narrowing.  The left neural foramen is patent.     Lumbar degenerative disc disease and spondylosis level by level discussed above. Electronically signed by: Los Crenshaw Date:    03/11/2023 Time:    13:02    US Abdomen Complete    Result Date: 3/12/2023  EXAMINATION: US ABDOMEN COMPLETE CLINICAL HISTORY: Further evaluate liver and kidney findings in CT; TECHNIQUE: Multiple real-time transverse and longitudinal sections were performed of the abdomen by the sonographer. Select images were submitted for review. COMPARISON: CT abdomen pelvis March 11, 2023 FINDINGS: There is coarsened heterogeneous hepatic parenchyma consistent with steatosis and are cirrhosis.  There is hepatic septated cyst which measures 1.3 x 1.2 x 1.2 cm.  Previous CT exam was remarkable for multiple additional hepatic hypodense structures which could not be delineated with ultrasound exam.  Liver is not enlarged in size and shows maximum diameter of 13.8 cm.  There is unremarkable antegrade flow within the portal vein.  Pancreas is mostly obscured by overlying bowel gas.  Spleen is not identified with overlying bowel gas. Inferior vena cava and abdominal aorta are obscured.  Gallbladder is surgically absent. Common bile duct caliber of 7 mm. The right kidney measures 9.5 x 4.1 x 4.6 cm and the left kidney dimensions are 9.9 x 6.1 x 4.5 cm.  Right kidney is remarkable for a parapelvic cyst which measures 2.2 x 2.0 x 2.5 cm and there is also upper pole cyst which measures 2.9 x 2.6 x 2.6 cm.  Left kidney upper pole is obscured by the bowel gas.  The kidneys collecting system is unremarkable without hydronephrosis evidence.     1. Coarsened heterogeneous hepatic parenchyma. 2. Right hepatic lobe septated cyst.  Additional hepatic hypodensities seen on CT abdomen could not be delineated with ultrasound exam. 3.  Previous cholecystectomy.  4.  Right kidney cysts. Electronically signed by: Los Crenshaw Date:    03/12/2023 Time:    13:40    CT Maxillofacial Without Contrast    Result Date: 3/10/2023  EXAMINATION: CT MAXILLOFACIAL WITHOUT CONTRAST CLINICAL HISTORY: Facial trauma, blunt; TECHNIQUE: Multidetector axial images were performed maxillofacial without contrast and images reformatted. Dose length product of 1418 mGycm. Automated exposure control was utilized to minimize radiation dose. COMPARISON: None available FINDINGS: There is right supraorbital soft tissue inflammation.  There are no fractures of the orbital walls. The globes are unremarkable and no intra-orbital inflammations or emphysema identified. There are no fractures of the nasal bones, pterygoids, zygomatic arches, paranasal sinuses walls or the mandibles.     No acute maxillofacial fracture identified. Electronically signed by: Los Crenshaw Date:    03/10/2023 Time:    13:24    X-Ray Pelvis Routine AP    Result Date: 3/10/2023  EXAMINATION: XR PELVIS ROUTINE AP CLINICAL HISTORY: trauma; COMPARISON: None FINDINGS: Frontal image of the pelvis.  There is no fracture or dislocation.  Degenerative changes of the hips without significant joint space narrowing.     No acute findings. Electronically signed by: Primitivo Hernández Date:    03/10/2023 Time:    13:42    Echo    Result Date: 3/10/2023  · There is no evidence of intracardiac shunting. Negative Bubble study. · The estimated ejection fraction is 60%. · Grade I left ventricular diastolic dysfunction. · The left ventricle is normal in size with normal systolic function. · Normal right ventricular size with normal right ventricular systolic function. · Mild mitral regurgitation. · Intermediate central venous pressure (8 mmHg).      CV Ultrasound Bilateral Doppler Carotid    Result Date: 3/11/2023  Bilateral internal Carotid arteries no flow limiting stenotic lesions noted. Bilateral antegrade vertebral flow.    CT Chest Abdomen Pelvis  Without Contrast (XPD)    Result Date: 3/11/2023  EXAMINATION: CT CHEST ABDOMEN PELVIS WITHOUT CONTRAST(XPD) CLINICAL HISTORY: Rule out occult malignancy; TECHNIQUE: Multidetector axial images were obtained from the thoracic inlet through the greater trochanters without the administration of IV contrast. Dose length product of 290 mGycm. Automated exposure control was utilized to minimize radiation dose. COMPARISON: None available. CHEST FINDINGS: Bilateral dependent small amount of pleural bibasilar atelectasis.  There is no convincing lung consolidation.  There is no discrete pulmonary nodule or mass lesion.  There is no pulmonary edema or hazy pneumonitis.  Chest lymph nodes are not enlarged in size.  Thoracic aorta is without aneurysmal dilatation.  Cardiac chamber size is within normal limits. ABDOMINAL FINDINGS: There is right hepatic lobe anterior segment 4 mm hypodensity which is small to characterize.  There is additional right hepatic lobe 10 mm hypodensity which is favored to be a cyst.  There are degenerate kidney posterior segment small hypodensities on image 53 series 2 hepatic volume is within normal limits.  Within limitation noncontrast technique, no acute findings liver, pancreas and spleen identified.  Gallbladder is surgically absent.  Adrenals are unremarkable.  There is right kidney parapelvic 2.4 cm hypodensity which is not adequately characterized and may be a cyst.  There is another right kidney peripheral cortical exophytic hypodensity measuring 2.6 x 2.4 cm and is not characterized as being cystic or solid.  Please further assess right kidney hypodensities with ultrasound exam.  No renal calculi and there is no obstructive uropathy.  Mild perinephric strandings.  There are subcentimeter retroperitoneal periaortic lymph nodes. Stomach is decompressed.  There is no abnormal dilatation of loops of small bowel.  There is quite limited assessment of colon due to presence of fecal content and  lack of enteric contrast.  Please correlate patient is up-to-date on colonoscopy.  No bowel obstruction.  No free fluid. PELVIC FINDINGS: Urinary bladder wall is not thickened.  Prostate mildly enlarged with calcifications.  No pelvic enlarged lymph nodes. There are thoracolumbar spine degenerative changes.  No acute or aggressive skeletal abnormality     1.  Bilateral small pleural effusions. 2.  Right hepatic lobe hypodensities are small to characterize as being solid or cystic on this exam without contrast.  Please further assess with ultrasound exam 3.  Previous cholecystectomy. 4.  Limited assessment of colon due to presence of fecal content.  Please correlate patient is up-to-date on colonoscopy. 5.  Right kidney parapelvic hypodensity may be a cyst.  Another right kidney exophytic hypodensity is not characterized as being cystic or solid.   Please further assess with ultrasound exam. Electronically signed by: Los Crenshaw Date:    03/11/2023 Time:    22:42    CTA Head and Neck (xpd)    Result Date: 3/10/2023  EXAMINATION: CTA HEAD AND NECK (XPD) CLINICAL HISTORY: AMS. TECHNIQUE: Brain and neck imaging was performed from skull base to vertex prior to intravenous contrast. CT Angiogram of head and neck was subsequently performed following intravenous contrast administration.  Coronal and sagittal MPR reconstructions were performed in addition to coronal and sagittal MIP reconstructions. Dose length product was 1272 mGycm. Automated exposure control was utilized to minimize radiation dose. COMPARISON: CT brain without contrast same date FINDINGS: Carotid arteries are assessed in accordance with the NASCET criteria. The unenhanced portion of the study visualized thoracic aorta is without aneurysmal dilatation or dissection.  There is no significant stenosis of the brachiocephalic trunk and the visualized portion of the subclavian arteries.  There is unremarkable flow bilaterally within the common carotid  arteries, internal and external carotid arteries without focal stenosis, dissection or intraluminal thrombus.  Cavernous and supraclinoid portion of internal carotid arteries are again patent.  There is also unremarkable flow bilaterally within the middle cerebral arteries, anterior cerebral artery and the major branches. There is unremarkable flow within bilateral vertebral arteries.  The basilar artery is also patent.  There is also patency of bilateral posterior cerebral arteries. Visualized portion lungs are clear.  Unremarkable enhancement thyroid glands.     No hemodynamically significant flow-limiting stenosis identified. Electronically signed by: Los Crenshaw Date:    03/10/2023 Time:    13:33  - pulls last radiology orders        Medication List      You have not been prescribed any medications.          Explained in detail to the patient about the discharge plan, medications, and follow-up visits. Pt understands and agrees with the treatment plan  Discharged Condition: stable  Diet: REGULAR  Disposition: TCU    Medications Per DC med rec  Activities as tolerated  Follow up with your PCP in 2 wks   For further questions contact hospitalist office    Discharge time 33 minutes    For worsening symptoms, chest pain, shortness of breath, increased abdominal pain, high grade fever, stroke or stroke like symptoms, immediately go to the nearest Emergency Room or call 911 as soon as possible.      Zaki Munoz M.D, on 3/16/2023. at 10:22 AM.

## 2023-03-16 NOTE — PROGRESS NOTES
[facility] Hospital Medicine Progress Note        Chief Complaint: Inpatient Follow-up     HPI:   67-year-old  male with significant history of glaucoma, vision loss secondary to same was brought to the family as a level 2 trauma after an unwitnessed fall at home.  Patient lives alone, but has good support system/family around who helps with his daily activities including cooking/feeding.  At baseline per family patient is able to ambulate, but has been falling a lot recently secondary to imbalance/vision issues secondary to glaucoma.  Patient was found at home on the floor on his right side with associated right-sided facial swelling, questionable right-sided weakness and increased confusion.  He was brought to the ED.  Hemodynamics were stable.  Lab significant for elevated CPK.  Troponins negative.  No acute injuries on imaging.  Hospitalist medicine services consulted and neurology services consulted given concern for CVA.  Stroke workup ordered.  Stroke workup was negative.  Echocardiogram unremarkable except for diastolic dysfunction paid bilateral lower extremity weakness noted and therefore MRI L-spine ordered.  EEG to rule out seizures given elevated CPK.  IV fluids continued.  No renal insufficiency.  Severe physical deconditioning and therefore therapy services consulted .  CT chest, abdomen, pelvis to rule out occult malignancy.  Also ordered tumor markers.  CT chest, abdomen, pelvis not impressive except for some liver lesion/renal lesions for which ultrasound abdomen was ordered to further evaluate.  Tumor markers negative.  CPK trending down on IV fluids.  Ultrasound abdomen-non impressive.  Given bradykinesia/progressive cognitive decline decided to reconsult Neurology to rule out Parkinson's disease.  MRI C-spine also was ordered.  MRI C-spine only with degenerated changes.  Neurology reconsulted to evaluate for possible Parkinson's disease given  bradykinesia/cognitive decline.  They initiated him on Sinemet on 03/14 . CPK trending down on IV fluids which were continued.        Interval Hx:   Patient doing okay this morning.  Resting comfortably in the bed.  His son is at the bedside.  Still some low blood pressures overnight but son does report significant improved movement in the patient's extremities.  Case he was able actually feed himself yesterday.      Objective/physical exam:  General: In no acute distress, afebrile  Chest: Clear to auscultation bilaterally  Heart: S1, S2, no appreciable murmur  Abdomen: Soft, nontender, BS +  MSK: Warm, no lower extremity edema, no clubbing or cyanosis  Neurologic:  Alert and awake, minimally communicative.  Global weakness    VITAL SIGNS: 24 HRS MIN & MAX LAST   Temp  Min: 97.5 °F (36.4 °C)  Max: 98 °F (36.7 °C) 97.7 °F (36.5 °C)   BP  Min: 82/46  Max: 105/66 101/64   Pulse  Min: 57  Max: 74  (!) 57     Resp  Min: 18  Max: 20 18   SpO2  Min: 97 %  Max: 99 % 99 %       Recent Labs   Lab 03/10/23  1239 03/11/23 0452 03/13/23 0424   WBC 12.6* 9.9 9.3   RBC 4.21* 3.79* 3.90*   HGB 13.9* 12.4* 12.7*   HCT 39.6* 35.6* 36.3*   MCV 94.1* 93.9 93.1   MCH 33.0 32.7 32.6   MCHC 35.1 34.8 35.0   RDW 13.3 13.3 12.9    225 228   MPV 9.7 9.9 10.1       Recent Labs   Lab 03/10/23  1239 03/10/23  2019 03/11/23  0452 03/13/23  0424     --  137 137   K 3.8  --  3.7 3.8   CO2 23  --  22* 25   BUN 16.0  --  13.5 8.1*   CREATININE 1.12  --  0.84 0.68*   CALCIUM 8.9  --  8.1* 8.2*   MG  --  2.00 1.80  --    ALBUMIN 3.4  --  2.7* 2.7*   ALKPHOS 85  --  69 64   ALT 20  --  27 33   AST 51*  --  80* 58*   BILITOT 1.1  --  1.2 0.8          Microbiology Results (last 7 days)       Procedure Component Value Units Date/Time    Blood Culture [[id number]] Collected: 03/15/23 1923    Order Status: Resulted Specimen: Blood from Arm, Left Updated: 03/15/23 1950    Blood Culture [[id number]] Collected: 03/15/23 1928    Order Status:  Resulted Specimen: Blood from Arm, Right Updated: 03/15/23 1950             See below for Radiology    Scheduled Med:   carbidopa-levodopa  mg  1 tablet Oral TID    enoxaparin  40 mg Subcutaneous Daily    midodrine  10 mg Oral BID WM    polyethylene glycol  17 g Oral BID        Continuous Infusions:   lactated ringers 100 mL/hr at 03/16/23 0445        PRN Meds:  labetalol       Assessment/Plan:   Hypotension  Adult failure to thrive  Acute nontraumatic rhabdomyolysis   Global weakness  Bradykinesia with progressive cognitive decline-suspected Parkinson's disease  Mechanical fall-recurrent  Progressive vision loss secondary to glaucoma  Severe physical deconditioning     Patient seems to be getting some improvement from his Sinemet initiation.    Will continue working with therapy at least daily.    Neurology is following along.  Follow up any further recommendations.    He is on midodrine for blood pressure support.  Currently at 10 mg b.i.d..  Blood pressure this morning is stable.  Hopefully with further conditioning this will continue to improve and we can wean off the midodrine altogether.    Case management is looking into placement.  There in conversations with TCU.      Patient condition:  Stable    Anticipated discharge and Disposition: TBD      All diagnosis and differential diagnosis have been reviewed; assessment and plan has been documented; I have personally reviewed the labs and test results that are presently available; I have reviewed the patients medication list; I have reviewed the consulting providers response and recommendations. I have reviewed or attempted to review medical records based upon their availability    All of the patient's questions have been  addressed and answered. Patient's is agreeable to the above stated plan. I will continue to monitor closely and make adjustments to medical management as  needed.  _____________________________________________________________________      Radiology:  MRI Cervical Spine Without Contrast  Narrative: EXAMINATION:  MRI CERVICAL SPINE WITHOUT CONTRAST    CLINICAL HISTORY:  RUE weakness;    TECHNIQUE:  Multiple MRI pulse sequences were performed of the cervical spine without contrast.    COMPARISON:  CT of the cervical spine March 10, 2023.    FINDINGS:  Cervical vertebrae stature preserved and the alignment is unremarkable.  There are no acute marrow edematous changes.  There are mild interspinous ligamentous edematous changes at C4-C5 and C5-C6.  Cervical cord images are partially degraded by artifacts without definite cord edema or myelomalacia.  Disc segmental analysis is given below:    At C2-C3, disc height is preserved.  Central canal is not stenosed.  Right neural foramen is unremarkable.  Mild narrowing of the left neural foramen is caused by uncovertebral and facet arthropathy.    At C3-C4, disc height is preserved.  Central canal is not stenosed.  There is bilateral uncovertebral and facet arthropathy.  There is no significant narrowing of the right neural foramen.  There is mild spondylotic narrowing of the left neural canal.    At C4-C5, there is mild osteophyte disc complex which slightly indents the ventral thecal sac.  Cord is not compressed.  There is bilateral uncovertebral and facet arthropathy.  This results in mild narrowing of the right neural foramen.  The left neural foramen is patent.    At C5-C6, there is osteophyte disc complex which indents the ventral thecal sac without causing cord compression.  There is moderate spondylotic narrowing of the right neural foramen and mild narrowing of left neural canal.    At C6-C7, disc height is preserved.  Central canal is not stenosed.  There is moderate to marked narrowing of the right neural foramen.  There is mild spondylotic narrowing of the left neural foramen.    At C7-T1, disc height is preserved.   Central canal is not stenosed.  Right neural foramen is unremarkable.  There is mild spondylotic narrowing of the left neural foramen  Impression: 1. Cervical spine degenerative disc disease and spondylosis level by level discussed above.    2. C4-C5 and C5-C6 interspinous ligamentous mild edematous changes.    Electronically signed by: Los Crenshaw  Date:    03/14/2023  Time:    13:34      Zaki Jim MD   03/16/2023

## 2023-03-16 NOTE — PLAN OF CARE
03/16/23 1107   Final Note   Assessment Type Final Discharge Note   Anticipated Discharge Disposition SNF   Hospital Resources/Appts/Education Provided Post-Acute resouces added to AVS   Post-Acute Status   Post-Acute Authorization Placement   Post-Acute Placement Status Set-up Complete/Auth obtained   Discharge Delays None known at this time     Spoke to Desiree (TCU) with acceptance of patient received. Transportation has been arranged for today @ 1:00 PM with Liz.

## 2023-03-16 NOTE — PRE ADMISSION SCREENING
[facility]    Pre-Admission Patient Screening                    Pre-Screen type:  SNF    Facility Status: Accept     Referring Physician:  Denita    Admitting Physician:  Barbara Barger MD    Primary Care Physician:  Primary Doctor No    History         Patient Active Problem List    Diagnosis Date Noted    Bradykinesia 03/14/2023    Rhabdomyolysis 03/11/2023         Previous Specialties/Consulted physicians:      Neurology      Past and Current Medical History    No past medical history on file.        History of Present Illness     67-year-old  male with significant history of glaucoma, vision loss secondary to same was brought to the family as a level 2 trauma after an unwitnessed fall at home.  Patient lives alone, but has good support system/family around who helps with his daily activities including cooking/feeding.  At baseline per family patient is able to ambulate, but has been falling a lot recently secondary to imbalance/vision issues secondary to glaucoma.  Patient was found at home on the floor on his right side with associated right-sided facial swelling, questionable right-sided weakness and increased confusion.  He was brought to the ED.  Hemodynamics were stable.  Lab significant for elevated CPK.  Troponins negative.  No acute injuries on imaging.  Hospitalist medicine services consulted and neurology services consulted given concern for CVA.  Stroke workup ordered.  Stroke workup was negative.  Echocardiogram unremarkable except for diastolic dysfunction paid bilateral lower extremity weakness noted and therefore MRI L-spine ordered.  EEG to rule out seizures given elevated CPK.  IV fluids continued.  No renal insufficiency.  Severe physical deconditioning and therefore therapy services consulted .  CT chest, abdomen, pelvis to rule out occult malignancy.  Also ordered tumor markers.  CT chest, abdomen, pelvis not impressive except for some liver lesion/renal  lesions for which ultrasound abdomen was ordered to further evaluate.  Tumor markers negative.  CPK trending down on IV fluids.  Ultrasound abdomen-non impressive.  Given bradykinesia/progressive cognitive decline decided to reconsult Neurology to rule out Parkinson's disease.  MRI C-spine also was ordered.  MRI C-spine only with degenerated changes.  Neurology reconsulted to evaluate for possible Parkinson's disease given bradykinesia/cognitive decline.  They initiated him on Sinemet on 03/14 . CPK trending down on IV fluids which were continued.  Assessment/Plan:   Hypotension  Adult failure to thrive  Acute nontraumatic rhabdomyolysis   Global weakness  Bradykinesia with progressive cognitive decline-suspected Parkinson's disease  Mechanical fall-recurrent  Progressive vision loss secondary to glaucoma  Severe physical deconditioning      Patient seems to be getting some improvement from his Sinemet initiation.    Will continue working with therapy at least daily.    Neurology is following along.  Follow up any further recommendations.    He is on midodrine for blood pressure support.  Currently at 10 mg b.i.d..  Blood pressure this morning is stable.  Hopefully with further conditioning this will continue to improve and we can wean off the midodrine altogether.   LTACH Admission Criteria:    N/A: SNF Inpatient    LTACH more appropriate than other levels of care (eg, skilled nursing facility, home health care), as indicated by:    Clinical management of patient deemed too frequent and needed beyond the capabilities of alternative levels of care as evidence by: Continued Titration of IV Medications, Continued use of IV analgesics, Blood glucose monitoring greater than or equal to 4 times daily requiring clinical intervention, Active titration of oxygen , and Frequency of IV medications greater than or equal to 2 times daily  Frequent diagnostic services needed on an inpatient basis, including clinical assessments,  laboratory, and imaging as evidence by: Frequent monitoring and clinical assessments performed by a licensed RN to identify current and future patient needs by incorporating the recognition of normal vs abnormal body physiology, and to prompt recognition of pertinent changes to identify and prioritize appropriate interventions that can be performed within the acute inpatient setting. , Frequent monitoring and clinical assessments performed by a licensed RT to identify current and future patient needs by incorporating the recognition of normal vs abnormal body physiology of the Respiratory System, and to prompt recognition of pertinent changes to identify and prioritize appropriate interventions that can be performed within the acute inpatient setting. , and Frequent laboratory testing and/or imaging to aide in the improvement and effectiveness of patient's individualized treatment plan.   More intensive services, such as speciality nursing care, and/or onsite physician assessments needed that are not available at a lower level of care as evidence by: Daily physician intervention , Collaboration between consulting and attending providers still deemed a necessity to aide in the improvement and effectiveness of patient's individualized treatment plan, which can be provided at an [facility] level of care. , Continued inpatient hemodialysis (HD), Continued inpatient hyperbarics (HBO) , and Therapy Services to be included in patient's treatment plan in an effort to restore/improve patient's modality status to a safe level of functioning prior to acute illness.    Lower level of care has failed (eg, patient readmitted to acute care from a lower level of care).   Palliative and/or Hospice Care has been refused by patient/patient family.    [facility] as indicated by ALL of the following:      Hypotension Absent     Cardiovascular status stable     Stable chest findings     Renal function  accepctable   Pain adequately managed    Intake acceptable       No acute significant hepatic dysfunction (eg, new encephalopathy)   No acute severe unstable neurologic abnormalities (eg, Altered mental status that is severe or persistent, or evidence of ongoing CNS embolization or ischemia, worsening hydrocephalus)   No active bleeding or unstable disorders of hemostasis (eg, no recent need for transfusion, severe thrombocytopenia with bleeding)   No need for respiratory or other isolation, OR manageable at LTACH level of care    Long-term enteral feeding (eg, PEG) and intravenous access established, not needed, OR to be placed at LTACH level of care      Anticipated Discharge Disposition:    Home with caregiver support       Home with brother-in-law and sister. Brother-in-law is an ER nurse at [employer].      Patient Traveled outside of the [place] in the last 3 months? not applicable     Patient discharged from this LTAC to SNF within the last 45 days? no    Patient discharged from this LTAC to Rehab within the last 27 days? no    Prior residence: home    Prior Post-Acute Services: N/A     Allergies:   Review of patient's allergies indicates:   Allergen Reactions    Fish containing products     Shellfish containing products     Tramadol        Has patient received the current influenza vaccine (Oct 1 - March 31)? Unknown     Has patient received PPD skin test prior to admit? N/A     Code Status: Full Code    Orientation: Time, Place, Person, and Events  Confused at times.    Speech: with slurred speech    Vital Signs:     Date     Blood Pressure     Pulse     Respiratory Rate     O2 Saturation     Temperature         Bowel/Bladder: continent of bladder and continent of bowel    Dialysis: N/A         Peripheral IV - Single Lumen 03/15/23 2206 20 G Anterior;Left;Proximal Forearm (Active)   Site Assessment Clean;Dry;Intact;No redness;No swelling 03/16/23 0000   Line Status Infusing 03/16/23 0000   Dressing Status  Clean;Dry;Intact 23 0000   Dressing Intervention First dressing 23 0000   Number of days: 0       CBGs/Accuchecks: No     Precautions: Fall    Restraints: No     Isolation Precautions: N/A       Facility-Administered Medications as of 3/16/2023   Medication Dose Route Frequency Provider Last Rate Last Admin    carbidopa-levodopa  mg TBSR 1 tablet  1 tablet Oral TID Blessing Guo MD   1 tablet at 23 0812    enoxaparin injection 40 mg  40 mg Subcutaneous Daily Nallely Donald MD   40 mg at 03/15/23 1620    [COMPLETED] iopamidoL (ISOVUE-370) injection 100 mL  100 mL Intravenous ONCE PRN Orly Ascencio MD   100 mL at 03/10/23 1325    labetaloL injection 10 mg  10 mg Intravenous Q6H PRN Nitza Foreman AGACNP-BC        [] lactated ringers infusion   Intravenous Continuous Nallely Donald  mL/hr at 23 0155 New Bag at 23 0155    [] lactated ringers infusion   Intravenous Continuous Nallely Donald  mL/hr at 23 0932 New Bag at 23 0932    lactated ringers infusion   Intravenous Continuous Nallely Donald  mL/hr at 23 0445 New Bag at 23 0445    midodrine tablet 10 mg  10 mg Oral BID WM Nallely Dnoald MD   10 mg at 23 0812    polyethylene glycol packet 17 g  17 g Oral BID Nallely Donald MD   17 g at 23 0812    [COMPLETED] sodium chloride 0.9% bolus 1,000 mL 1,000 mL  1,000 mL Intravenous ED 1 Time Orly Ascencio MD   Stopped at 03/10/23 1327    [COMPLETED] Tdap (BOOSTRIX) vaccine injection 0.5 mL  0.5 mL Intramuscular ED 1 Time Orly Ascencio MD   0.5 mL at 03/10/23 1231     No current outpatient medications on file as of 3/16/2023.        Cardiovascular:    Cardiovascular Review: Within definable limits (WDL)    Telemetry: Yes    Rhythm: N/A    AICD: No      Respiratory:    Oxygen: N/A    CPT/Frequency: N/A    Incentive Spirometer/Frequency: N/A    CPAP/Settings: N/A    BiPAP/Settings: N/A    Oxygen Saturation:  Upper  "90's    Suction Frequency: N/A      Vent Settings:   Mode:   Rate:   TV:   FIO2:   PEEP:   PS:   iTime:    Trial/HS Settings:   Mode:   Rate:   TV:   FIO2:   PEEP:   PS:       Nutrition:      Ht Readings from Last 3 Encounters:   03/10/23 5' 5" (1.651 m)     Wt Readings from Last 1 Encounters:   03/10/23 2200 52.4 kg (115 lb 8 oz)   03/10/23 1500 (P) 64 kg (141 lb 1.5 oz)   03/10/23 1225 63.5 kg (140 lb)       Feeding Status:   Current Diet: Diet/PN Order: Diet Easy to Chew   Supplements: None   Tube Feeding: N/A   Flushes: N/A  Diet/PN Order: Diet Easy to Chew  Oral Supplement Order: none  Tube Feeding Order: none  Appetite/Oral Intake: poor/25-50% of meals  Factors Affecting Nutritional Intake: impaired cognitive status/motor control  Food/Christianity/Cultural Preferences: none reported  Food Allergies: shellfish      Integumentary:    Integumentary: Within definable limits (WDL)              Altered Skin Integrity 03/10/23 1229 Right Shoulder Abrasion(s) (Active)   03/10/23 1229   Altered Skin Integrity Present on Admission - Did Patient arrive to the hospital with altered skin?: yes   Side: Right   Orientation:    Location: Shoulder   Wound Number:    Is this injury device related?:    Primary Wound Type: Abrasion(s)   Description of Altered Skin Integrity:    Ankle-Brachial Index:    Pulses:    Removal Indication and Assessment:    Wound Outcome:    (Retired) Wound Length (cm):    (Retired) Wound Width (cm):    (Retired) Depth (cm):    Wound Description (Comments):    Removal Indications:    Wound Image   03/13/23 1440   Description of Altered Skin Integrity Partial thickness tissue loss. Shallow open ulcer with a red or pink wound bed, without slough. Intact or Open/Ruptured Serum-filled blister. 03/15/23 0800   Dressing Appearance Open to air;Dry 03/15/23 0800   Drainage Amount None 03/15/23 2000   Drainage Characteristics/Odor Clear 03/15/23 0800   Appearance Dressing in place, unable to visualize 03/15/23 " 2000   Tissue loss description Partial thickness 03/13/23 1440   Periwound Area Intact 03/14/23 2000   Wound Edges Jagged 03/14/23 2000   Wound Length (cm) 3 cm 03/13/23 1440   Wound Width (cm) 2.8 cm 03/13/23 1440   Wound Depth (cm) 0.1 cm 03/13/23 1440   Wound Volume (cm^3) 0.84 cm^3 03/13/23 1440   Wound Surface Area (cm^2) 8.4 cm^2 03/13/23 1440   Care Cleansed with:;Soap and water 03/15/23 0800   Dressing Applied 03/13/23 1440   Number of days: 6            Altered Skin Integrity 03/10/23 1242 Right Cheek Contusion (Active)   03/10/23 1242   Altered Skin Integrity Present on Admission - Did Patient arrive to the hospital with altered skin?: yes   Side: Right   Orientation:    Location: Cheek   Wound Number:    Is this injury device related?:    Primary Wound Type: Contusion   Description of Altered Skin Integrity:    Ankle-Brachial Index:    Pulses:    Removal Indication and Assessment:    Wound Outcome:    (Retired) Wound Length (cm):    (Retired) Wound Width (cm):    (Retired) Depth (cm):    Wound Description (Comments):    Removal Indications:    Description of Altered Skin Integrity Partial thickness tissue loss. Shallow open ulcer with a red or pink wound bed, without slough. Intact or Open/Ruptured Serum-filled blister. 03/15/23 0800   Dressing Appearance Open to air;Dry;Intact 03/15/23 0800   Drainage Amount None 03/15/23 0800   Appearance Pink;Red 03/12/23 1830   Tissue loss description Not applicable 03/10/23 2200   Periwound Area Edematous 03/10/23 2200   Care Cleansed with:;Soap and water 03/15/23 0800   Number of days: 6            Altered Skin Integrity 03/10/23 1242 Right dorsal Elbow Contusion (Active)   03/10/23 1242   Altered Skin Integrity Present on Admission - Did Patient arrive to the hospital with altered skin?: yes   Side: Right   Orientation: dorsal   Location: Elbow   Wound Number:    Is this injury device related?:    Primary Wound Type: Contusion   Description of Altered Skin  Integrity:    Ankle-Brachial Index:    Pulses:    Removal Indication and Assessment:    Wound Outcome:    (Retired) Wound Length (cm):    (Retired) Wound Width (cm):    (Retired) Depth (cm):    Wound Description (Comments):    Removal Indications:    Description of Altered Skin Integrity Partial thickness tissue loss. Shallow open ulcer with a red or pink wound bed, without slough. Intact or Open/Ruptured Serum-filled blister. 03/15/23 0800   Dressing Appearance Open to air;Dry 03/15/23 0800   Drainage Amount None 03/15/23 0800   Appearance Pink;Red 03/12/23 1830   Tissue loss description Partial thickness 03/10/23 2200   Periwound Area Hematoma;Edematous 03/10/23 2200   Care Cleansed with:;Soap and water 03/15/23 0800   Number of days: 6            Altered Skin Integrity 03/10/23 1243 Right anterior Knee Contusion (Active)   03/10/23 1243   Altered Skin Integrity Present on Admission - Did Patient arrive to the hospital with altered skin?: yes   Side: Right   Orientation: anterior   Location: Knee   Wound Number:    Is this injury device related?:    Primary Wound Type: Contusion   Description of Altered Skin Integrity:    Ankle-Brachial Index:    Pulses:    Removal Indication and Assessment:    Wound Outcome:    (Retired) Wound Length (cm):    (Retired) Wound Width (cm):    (Retired) Depth (cm):    Wound Description (Comments):    Removal Indications:    Description of Altered Skin Integrity Partial thickness tissue loss. Shallow open ulcer with a red or pink wound bed, without slough. Intact or Open/Ruptured Serum-filled blister. 03/15/23 0800   Dressing Appearance Dry;Open to air 03/15/23 0800   Drainage Amount None 03/15/23 0800   Appearance Pink;Red 03/12/23 1830   Tissue loss description Partial thickness 03/10/23 2200   Periwound Area Hematoma 03/10/23 2200   Care Cleansed with:;Soap and water 03/15/23 0800   Number of days: 6            Altered Skin Integrity 03/10/23 2200 Right lateral;posterior Hip  Abrasion(s) Partial thickness tissue loss. Shallow open ulcer with a red or pink wound bed, without slough. Intact or Open/Ruptured Serum-filled blister. (Active)   03/10/23 2200   Altered Skin Integrity Present on Admission - Did Patient arrive to the hospital with altered skin?: yes   Side: Right   Orientation: lateral;posterior   Location: Hip   Wound Number:    Is this injury device related?: No   Primary Wound Type: Abrasion(s)   Description of Altered Skin Integrity: Partial thickness tissue loss. Shallow open ulcer with a red or pink wound bed, without slough. Intact or Open/Ruptured Serum-filled blister.   Ankle-Brachial Index:    Pulses:    Removal Indication and Assessment:    Wound Outcome:    (Retired) Wound Length (cm):    (Retired) Wound Width (cm):    (Retired) Depth (cm):    Wound Description (Comments):    Removal Indications:    Wound Image   03/13/23 1440   Description of Altered Skin Integrity Partial thickness tissue loss. Shallow open ulcer with a red or pink wound bed, without slough. Intact or Open/Ruptured Serum-filled blister. 03/15/23 0800   Dressing Appearance Open to air 03/15/23 2000   Drainage Amount None 03/15/23 2000   Appearance Pink 03/15/23 2000   Tissue loss description Partial thickness 03/10/23 2200   Periwound Area Dry;Edematous 03/10/23 2200   Wound Edges Undefined 03/10/23 2200   Wound Length (cm) 0 cm 03/13/23 1440   Wound Width (cm) 0 cm 03/13/23 1440   Wound Depth (cm) 0 cm 03/13/23 1440   Wound Volume (cm^3) 0 cm^3 03/13/23 1440   Wound Surface Area (cm^2) 0 cm^2 03/13/23 1440   Care Cleansed with:;Soap and water 03/15/23 0800   Number of days: 6            Altered Skin Integrity 03/10/23 2200 Left medial;lower Buttocks Abrasion(s) Partial thickness tissue loss. Shallow open ulcer with a red or pink wound bed, without slough. Intact or Open/Ruptured Serum-filled blister. (Active)   03/10/23 2200   Altered Skin Integrity Present on Admission - Did Patient arrive to the  hospital with altered skin?: yes   Side: Left   Orientation: medial;lower   Location: Buttocks   Wound Number:    Is this injury device related?:    Primary Wound Type: Abrasion(s)   Description of Altered Skin Integrity: Partial thickness tissue loss. Shallow open ulcer with a red or pink wound bed, without slough. Intact or Open/Ruptured Serum-filled blister.   Ankle-Brachial Index:    Pulses:    Removal Indication and Assessment:    Wound Outcome:    (Retired) Wound Length (cm):    (Retired) Wound Width (cm):    (Retired) Depth (cm):    Wound Description (Comments):    Removal Indications:    Wound Image   03/13/23 1440   Description of Altered Skin Integrity Partial thickness tissue loss. Shallow open ulcer with a red or pink wound bed, without slough. Intact or Open/Ruptured Serum-filled blister. 03/15/23 0800   Dressing Appearance Open to air 03/15/23 0800   Drainage Amount None 03/14/23 2000   Appearance Intact 03/15/23 0800   Periwound Area Dry 03/10/23 2200   Wound Edges Undefined 03/10/23 2200   Wound Length (cm) 0 cm 03/13/23 1440   Wound Width (cm) 0 cm 03/13/23 1440   Wound Depth (cm) 0 cm 03/13/23 1440   Wound Volume (cm^3) 0 cm^3 03/13/23 1440   Wound Surface Area (cm^2) 0 cm^2 03/13/23 1440   Care Cleansed with:;Soap and water 03/15/23 0800   Number of days: 6            Altered Skin Integrity 03/10/23 2200 Right anterior Foot Abrasion(s) Partial thickness tissue loss. Shallow open ulcer with a red or pink wound bed, without slough. Intact or Open/Ruptured Serum-filled blister. (Active)   03/10/23 2200   Altered Skin Integrity Present on Admission - Did Patient arrive to the hospital with altered skin?: yes   Side: Right   Orientation: anterior   Location: Foot   Wound Number:    Is this injury device related?:    Primary Wound Type: Abrasion(s)   Description of Altered Skin Integrity: Partial thickness tissue loss. Shallow open ulcer with a red or pink wound bed, without slough. Intact or  Open/Ruptured Serum-filled blister.   Ankle-Brachial Index:    Pulses:    Removal Indication and Assessment:    Wound Outcome:    (Retired) Wound Length (cm):    (Retired) Wound Width (cm):    (Retired) Depth (cm):    Wound Description (Comments):    Removal Indications:    Wound Image   03/13/23 1440   Description of Altered Skin Integrity Partial thickness tissue loss. Shallow open ulcer with a red or pink wound bed, without slough. Intact or Open/Ruptured Serum-filled blister. 03/15/23 0800   Dressing Appearance Dry;Open to air 03/15/23 0800   Drainage Amount None 03/15/23 0800   Appearance Fibrin 03/13/23 1440   Tissue loss description Partial thickness 03/10/23 2200   Wound Edges Undefined 03/10/23 2200   Wound Length (cm) 0 cm 03/13/23 1440   Wound Width (cm) 0 cm 03/13/23 1440   Wound Depth (cm) 0 cm 03/13/23 1440   Wound Volume (cm^3) 0 cm^3 03/13/23 1440   Wound Surface Area (cm^2) 0 cm^2 03/13/23 1440   Care Cleansed with:;Soap and water 03/15/23 0800   Number of days: 6            Altered Skin Integrity 03/10/23 2200 Left anterior Foot Abrasion(s) (Active)   03/10/23 2200   Altered Skin Integrity Present on Admission - Did Patient arrive to the hospital with altered skin?: yes   Side: Left   Orientation: anterior   Location: Foot   Wound Number:    Is this injury device related?:    Primary Wound Type: Abrasion(s)   Description of Altered Skin Integrity:    Ankle-Brachial Index:    Pulses:    Removal Indication and Assessment:    Wound Outcome:    (Retired) Wound Length (cm):    (Retired) Wound Width (cm):    (Retired) Depth (cm):    Wound Description (Comments):    Removal Indications:    Wound Image   03/13/23 1440   Description of Altered Skin Integrity Partial thickness tissue loss. Shallow open ulcer with a red or pink wound bed, without slough. Intact or Open/Ruptured Serum-filled blister. 03/15/23 0800   Dressing Appearance Open to air;Dry 03/15/23 0800   Drainage Amount None 03/15/23 0800    Appearance Pink;Red 03/12/23 1600   Tissue loss description Partial thickness 03/10/23 2200   Periwound Area Dry 03/10/23 2200   Care Cleansed with:;Soap and water 03/15/23 0800   Number of days: 6            Altered Skin Integrity 03/10/23 2200 Right lateral Ankle Abrasion(s) Partial thickness tissue loss. Shallow open ulcer with a red or pink wound bed, without slough. Intact or Open/Ruptured Serum-filled blister. (Active)   03/10/23 2200   Altered Skin Integrity Present on Admission - Did Patient arrive to the hospital with altered skin?: yes   Side: Right   Orientation: lateral   Location: Ankle   Wound Number:    Is this injury device related?:    Primary Wound Type: Abrasion(s)   Description of Altered Skin Integrity: Partial thickness tissue loss. Shallow open ulcer with a red or pink wound bed, without slough. Intact or Open/Ruptured Serum-filled blister.   Ankle-Brachial Index:    Pulses:    Removal Indication and Assessment:    Wound Outcome:    (Retired) Wound Length (cm):    (Retired) Wound Width (cm):    (Retired) Depth (cm):    Wound Description (Comments):    Removal Indications:    Description of Altered Skin Integrity Partial thickness tissue loss. Shallow open ulcer with a red or pink wound bed, without slough. Intact or Open/Ruptured Serum-filled blister. 03/15/23 0800   Dressing Appearance Open to air;Dry 03/15/23 0800   Drainage Amount None 03/15/23 0800   Appearance Pink;Red 03/12/23 1600   Tissue loss description Partial thickness 03/10/23 2200   Care Cleansed with:;Soap and water 03/15/23 0800   Number of days: 6         Musculoskeletal:    Transfer assist: Transfer Training:   Sit>stand from EOB Min A. Stand-step t/f >BS chair with Min A and cues for walker management. Also demo'd difficulty following commands requiring TC to advance feet at times.    Weight Bearing Status: Full    Comments: Bed Mobility:     Supine to Sit: moderate assistance  Sit to Supine: moderate  assistance  Transitional Sit-to-stand: Min A with RW  Static Standing: Min A with RW  Pt stood EOB while being cleaned and brief donned   Gait: Min/Mod A with RW  Pt amb 84ft X 2 with step through gait pattern. Pt presents with NBOS with LLE crossing into midline. Pt presents with R lateral lean with R lateral excursion of RW. Assistance to steer and advance RW.     ADL Assist: Activity did not occur    Special Equipment: walker    Radiology:    Radiology (Last 168 hours)               03/16 0642 CARDIAC MONITORING STRIPS     03/16 0357 CARDIAC MONITORING STRIPS     03/16 0356 CARDIAC MONITORING STRIPS     03/15 1459 CARDIAC MONITORING STRIPS     03/15 1036 CARDIAC MONITORING STRIPS     03/15 0635 CARDIAC MONITORING STRIPS     03/15 0315 CARDIAC MONITORING STRIPS     03/14 2331 CARDIAC MONITORING STRIPS     03/14 1912 CARDIAC MONITORING STRIPS     03/14 1524 CARDIAC MONITORING STRIPS     03/14 1103 CARDIAC MONITORING STRIPS     03/14 0652 CARDIAC MONITORING STRIPS     03/14 0242 CARDIAC MONITORING STRIPS     03/13 2238 CARDIAC MONITORING STRIPS     03/13 1846 CARDIAC MONITORING STRIPS     03/13 1440 CARDIAC MONITORING STRIPS     03/13 1216 MRI Cervical Spine Without Contrast       03/13 1050 CARDIAC MONITORING STRIPS     03/13 0638 CARDIAC MONITORING STRIPS     03/13 0320 CARDIAC MONITORING STRIPS     03/12 2312 CARDIAC MONITORING STRIPS     03/12 1918 CARDIAC MONITORING STRIPS     03/12 1311 US Abdomen Complete       03/12 1146 CARDIAC MONITORING STRIPS     03/12 0658 CARDIAC MONITORING STRIPS     03/12 0328 CARDIAC MONITORING STRIPS     03/11 2321 CARDIAC MONITORING STRIPS     03/11 2230 CT Chest Abdomen Pelvis Without Contrast (XPD)       03/11 1928 CARDIAC MONITORING STRIPS     03/11 1446 CARDIAC MONITORING STRIPS     03/11 1229 MRI Lumbar Spine Without Contrast       03/11 0704 CARDIAC MONITORING STRIPS     03/11 0242 CARDIAC MONITORING STRIPS     03/11 0242 CARDIAC MONITORING STRIPS     03/11 0240 CARDIAC  MONITORING STRIPS     03/10 2131 CV Ultrasound Bilateral Doppler Carotid       03/10 1728 Echo       03/10 1537 MRI Brain Without Contrast       03/10 1325 CTA Head and Neck (xpd)       03/10 1312 CT Maxillofacial Without Contrast       03/10 1311 CT Cervical Spine Without Contrast       03/10 1310 CT Head Without Contrast       03/10 1301 X-Ray Forearm Right       03/10 1300 X-ray Shoulder 2 or More Views Right       03/10 1259 X-Ray Pelvis Routine AP       03/10 1257 X-Ray Chest 1 View       03/10 0000 CARDIAC MONITORING STRIPS     03/10 0000 CARDIAC MONITORING STRIPS     03/10 0000 CARDIAC MONITORING STRIPS     03/10 0000 CARDIAC MONITORING STRIPS              MRI Cervical Spine Without Contrast  Narrative: EXAMINATION:  MRI CERVICAL SPINE WITHOUT CONTRAST    CLINICAL HISTORY:  RUE weakness;    TECHNIQUE:  Multiple MRI pulse sequences were performed of the cervical spine without contrast.    COMPARISON:  CT of the cervical spine March 10, 2023.    FINDINGS:  Cervical vertebrae stature preserved and the alignment is unremarkable.  There are no acute marrow edematous changes.  There are mild interspinous ligamentous edematous changes at C4-C5 and C5-C6.  Cervical cord images are partially degraded by artifacts without definite cord edema or myelomalacia.  Disc segmental analysis is given below:    At C2-C3, disc height is preserved.  Central canal is not stenosed.  Right neural foramen is unremarkable.  Mild narrowing of the left neural foramen is caused by uncovertebral and facet arthropathy.    At C3-C4, disc height is preserved.  Central canal is not stenosed.  There is bilateral uncovertebral and facet arthropathy.  There is no significant narrowing of the right neural foramen.  There is mild spondylotic narrowing of the left neural canal.    At C4-C5, there is mild osteophyte disc complex which slightly indents the ventral thecal sac.  Cord is not compressed.  There is bilateral uncovertebral and facet  arthropathy.  This results in mild narrowing of the right neural foramen.  The left neural foramen is patent.    At C5-C6, there is osteophyte disc complex which indents the ventral thecal sac without causing cord compression.  There is moderate spondylotic narrowing of the right neural foramen and mild narrowing of left neural canal.    At C6-C7, disc height is preserved.  Central canal is not stenosed.  There is moderate to marked narrowing of the right neural foramen.  There is mild spondylotic narrowing of the left neural foramen.    At C7-T1, disc height is preserved.  Central canal is not stenosed.  Right neural foramen is unremarkable.  There is mild spondylotic narrowing of the left neural foramen  Impression: 1. Cervical spine degenerative disc disease and spondylosis level by level discussed above.    2. C4-C5 and C5-C6 interspinous ligamentous mild edematous changes.    Electronically signed by: Los Crenshaw  Date:    03/14/2023  Time:    13:34      Lab/Cultures:    Blood Urea Nitrogen   Date Value Ref Range Status   03/13/2023 8.1 (L) 8.4 - 25.7 mg/dL Final   03/11/2023 13.5 8.4 - 25.7 mg/dL Final     Creatinine   Date Value Ref Range Status   03/13/2023 0.68 (L) 0.73 - 1.18 mg/dL Final   03/11/2023 0.84 0.73 - 1.18 mg/dL Final     WBC   Date Value Ref Range Status   03/13/2023 9.3 4.5 - 11.5 x10(3)/mcL Final   03/11/2023 9.9 4.5 - 11.5 x10(3)/mcL Final      No results found for: CULTBLD, LABBLOO, CULBFAERO, CULBFANAERO, CULTGAS, CULTMRSA, CULTSTOOL, THROATCULTA, CULTURESP, LABURIN, URINESENSE, CULWND, TCULT, RESPIRATORYC, CDIFFICILEAN, CDIFFTOX  No results for input(s): PH, PCO2, PO2, HCO3, POCSATURATED, BE in the last 72 hours.

## 2023-03-16 NOTE — PT/OT/SLP PROGRESS
Physical Therapy Treatment    Patient Name:  Cristopher Farooq   MRN:  [id number]    Recommendations:     Discharge Recommendations: nursing facility, basic  Discharge Equipment Recommendations: none  Barriers to discharge:  Severity of deficits    Assessment:     Cristopher Farooq is a 67 y.o. male admitted with a medical diagnosis of Rhabdomyolysis.  He presents with the following impairments/functional limitations: weakness, impaired endurance, impaired functional mobility, gait instability, impaired balance, impaired cognition, decreased upper extremity function.    Rehab Prognosis: Good; patient would benefit from acute skilled PT services to address these deficits and reach maximum level of function.    Recent Surgery: * No surgery found *      Plan:     During this hospitalization, patient to be seen 6 x/week to address the identified rehab impairments via gait training, therapeutic activities, therapeutic exercises and progress toward the following goals:    Plan of Care Expires:  23    Subjective     Chief Complaint: None  Patient/Family Comments/goals: None  Pain/Comfort:  None      Objective:     Pt found in bed upon entry with son-in-law present.     General Precautions: Standard, fall  Orthopedic Precautions:    Braces: N/A  Respiratory Status: Room air  Blood Pressure:    Supine: 98/62   Sittin/64   Standin/74  Skin Integrity: Visible skin intact      Functional Mobility:  Bed Mobility:     Supine to Sit: moderate assistance  Transitional sit-to-stand: Min A with RW  Static Standing:   Pt stood EOB while brief donned  Gait: Mod A with RW  Pt amb 86ft with step through gait pattern. Pt presents with NBOS with LLE crossing into midline. Pt also presents with R lateral lean. Assistance given to weight shift, steer and advance RW.   Transfer Training: Mod A with RW  Stand step t/f from bed to bedside chair.     Therapeutic Activities/Exercises:  Trunk Stretching: Pt assisted into wb on L forearm while R  lateral trunk stretched    Patient left up in chair with call button in reach and son-in-law present..    GOALS:   Multidisciplinary Problems       Physical Therapy Goals          Problem: Physical Therapy    Goal Priority Disciplines Outcome Goal Variances Interventions   Physical Therapy Goal     PT, PT/OT Ongoing, Progressing     Description: Goals to be met by: 2023     Patient will increase functional independence with mobility by performin. Supine to sit with Stand-by Assistance  2. Sit to supine with Stand-by Assistance  3. Sit to stand transfer with Stand-by Assistance  4. Gait  x 200 feet with Contact Guard Assistance using Rolling Walker.                          Time Tracking:     PT Received On: 23  PT Start Time: 937     PT Stop Time: 1004  PT Total Time (min): 27 min     Billable Minutes: Gait Training 10 and Therapeutic Activity 17    Treatment Type: Treatment  PT/PTA: PTA     Number of PTA visits since last PT visit: 4     2023

## 2023-03-16 NOTE — PT/OT/SLP PROGRESS
Occupational Therapy  Treatment    Cristopher Farooq   MRN: [id number]   Admitting Diagnosis: Rhabdomyolysis    OT Date of Treatment: 03/16/23   OT Start Time: 1036  OT Stop Time: 1103  OT Total Time (min): 27 min     Billable Minutes:  Self Care/Home Management 27  Total Minutes: 27     OT/KWASI: KWASI     Number of KWASI visits since last OT visit: 2    General Precautions: Standard, fall  Orthopedic Precautions:    Braces:      Spiritual, Cultural Beliefs, Yarsanism Practices, Values that Affect Care: no    Subjective:  Communicated with RN prior to session.  Pt UIC upon entry and agreeable to OT session.      Objective:  Patient found with: peripheral IV, telemetry, PureWick  BP 93/60, RN aware    Functional Mobility:  Bed Mobility:   Supine to sit: Activity did not occur   Sit to supine: Activity did not occur   Rolling: Activity did not occur   Scooting: Activity did not occur    Transfer Training:   Sit>stand from BS chair x3 trials Min A with cues for proper hand placement on RW. Noted with R lateral lean    Grooming:  Pt completed oral hygiene in standing with Mod A for sequencing and incorporating RUE into ax 2/2 weakness and neglect. Min A for standing balance.     Toilet Training:  Pt noted to have large BM upon standing. Total A for brief management and posterior pericare in standing.     Balance:   Static Sit: GOOD+: Takes MAXIMAL challenges from all directions.    Dynamic Sit:  GOOD-: Incosistently Maintains balance through MODERATE excursions of active trunk movement,     Static Stand: POOR+: Needs MINIMAL assist to maintain  Dynamic stand: N/A    Additional Treatment:      Patient left up in chair with all lines intact, call button in reach, and family present    ASSESSMENT:  Cristopher Farooq is a 67 y.o. male with a medical diagnosis of Rhabdomyolysis and presents with good ax tolerance this session. Pt R arm noted to be slightly contracted in flexed position, provided PROM to extremity and applied soft elbow splint to  keep extended.     Rehab potential is excellent    Activity tolerance: Excellent    Discharge recommendations: nursing facility, skilled     Equipment recommendations:       GOALS:   Multidisciplinary Problems       Occupational Therapy Goals          Problem: Occupational Therapy    Goal Priority Disciplines Outcome Interventions   Occupational Therapy Goal     OT, PT/OT Ongoing, Progressing    Description: Goals to be met by: 3/24/23     Patient will increase functional independence with ADLs by performing:    UE Dressing with Stand-by Assistance.  LE Dressing with Minimal Assistance, using dressing AEDs as needed.  Grooming while standing at sink with Contact Guard Assistance.  Toileting from bedside commode with Minimal Assistance for hygiene and clothing management.                          Plan:  Patient to be seen 5 x/week to address the above listed problems via self-care/home management, therapeutic activities, therapeutic exercises  Plan of Care expires: 03/24/23  Plan of Care reviewed with: patient, family         03/16/2023

## 2023-03-20 LAB
BACTERIA BLD CULT: NORMAL
BACTERIA BLD CULT: NORMAL

## 2023-05-17 DIAGNOSIS — H40.1190 PRIMARY OPEN ANGLE GLAUCOMA, UNSPECIFIED GLAUCOMA STAGE, UNSPECIFIED LATERALITY: Primary | ICD-10-CM

## 2023-05-30 ENCOUNTER — HOSPITAL ENCOUNTER (EMERGENCY)
Facility: HOSPITAL | Age: 68
Discharge: HOME | End: 2023-05-30
Attending: EMERGENCY MEDICINE
Payer: MEDICARE

## 2023-05-30 VITALS
HEART RATE: 59 BPM | OXYGEN SATURATION: 100 % | DIASTOLIC BLOOD PRESSURE: 79 MMHG | HEIGHT: 66 IN | BODY MASS INDEX: 18.96 KG/M2 | SYSTOLIC BLOOD PRESSURE: 130 MMHG | TEMPERATURE: 99 F | RESPIRATION RATE: 18 BRPM | WEIGHT: 118 LBS

## 2023-05-30 DIAGNOSIS — N30.00 ACUTE CYSTITIS WITHOUT HEMATURIA: ICD-10-CM

## 2023-05-30 DIAGNOSIS — I95.9 HYPOTENSION: ICD-10-CM

## 2023-05-30 DIAGNOSIS — E86.0 DEHYDRATION: Primary | ICD-10-CM

## 2023-05-30 LAB
ALBUMIN SERPL-MCNC: 3.4 G/DL (ref 3.4–4.8)
ALBUMIN/GLOB SERPL: 1 RATIO (ref 1.1–2)
ALP SERPL-CCNC: 321 UNIT/L (ref 40–150)
ALT SERPL-CCNC: 16 UNIT/L (ref 0–55)
APPEARANCE UR: ABNORMAL
APTT PPP: 23.9 SECONDS (ref 23.4–33.9)
AST SERPL-CCNC: 17 UNIT/L (ref 5–34)
BACTERIA #/AREA URNS AUTO: ABNORMAL /HPF
BASOPHILS # BLD AUTO: 0.13 X10(3)/MCL
BASOPHILS NFR BLD AUTO: 1.1 %
BILIRUB UR QL STRIP.AUTO: NEGATIVE MG/DL
BILIRUBIN DIRECT+TOT PNL SERPL-MCNC: 0.3 MG/DL
BUN SERPL-MCNC: 20.5 MG/DL (ref 8.4–25.7)
CALCIUM SERPL-MCNC: 9 MG/DL (ref 8.8–10)
CHLORIDE SERPL-SCNC: 110 MMOL/L (ref 98–107)
CK SERPL-CCNC: 55 U/L (ref 30–200)
CO2 SERPL-SCNC: 22 MMOL/L (ref 23–31)
COLOR UR: ABNORMAL
CREAT SERPL-MCNC: 0.73 MG/DL (ref 0.73–1.18)
EOSINOPHIL # BLD AUTO: 0.27 X10(3)/MCL (ref 0–0.9)
EOSINOPHIL NFR BLD AUTO: 2.2 %
ERYTHROCYTE [DISTWIDTH] IN BLOOD BY AUTOMATED COUNT: 13.8 % (ref 11.5–17)
FLUAV AG UPPER RESP QL IA.RAPID: NOT DETECTED
FLUBV AG UPPER RESP QL IA.RAPID: NOT DETECTED
GFR SERPLBLD CREATININE-BSD FMLA CKD-EPI: >60 MLS/MIN/1.73/M2
GLOBULIN SER-MCNC: 3.5 GM/DL (ref 2.4–3.5)
GLUCOSE SERPL-MCNC: 86 MG/DL (ref 82–115)
GLUCOSE UR QL STRIP.AUTO: NEGATIVE MG/DL
HCT VFR BLD AUTO: 44 % (ref 42–52)
HGB BLD-MCNC: 14.6 G/DL (ref 14–18)
IMM GRANULOCYTES # BLD AUTO: 0.08 X10(3)/MCL (ref 0–0.04)
IMM GRANULOCYTES NFR BLD AUTO: 0.7 %
INR BLD: 1.04 (ref 2–3)
KETONES UR QL STRIP.AUTO: NEGATIVE MG/DL
LACTATE SERPL-SCNC: 1.5 MMOL/L (ref 0.5–2.2)
LEUKOCYTE ESTERASE UR QL STRIP.AUTO: ABNORMAL UNIT/L
LYMPHOCYTES # BLD AUTO: 2.03 X10(3)/MCL (ref 0.6–4.6)
LYMPHOCYTES NFR BLD AUTO: 16.8 %
MAGNESIUM SERPL-MCNC: 2 MG/DL (ref 1.6–2.6)
MCH RBC QN AUTO: 31.8 PG (ref 27–31)
MCHC RBC AUTO-ENTMCNC: 33.2 G/DL (ref 33–36)
MCV RBC AUTO: 95.9 FL (ref 80–94)
MONOCYTES # BLD AUTO: 0.91 X10(3)/MCL (ref 0.1–1.3)
MONOCYTES NFR BLD AUTO: 7.5 %
MUCOUS THREADS URNS QL MICRO: ABNORMAL /LPF
NEUTROPHILS # BLD AUTO: 8.69 X10(3)/MCL (ref 2.1–9.2)
NEUTROPHILS NFR BLD AUTO: 71.7 %
NITRITE UR QL STRIP.AUTO: POSITIVE
NRBC BLD AUTO-RTO: 0 %
PH UR STRIP.AUTO: 5.5 [PH]
PLATELET # BLD AUTO: 234 X10(3)/MCL (ref 130–400)
PMV BLD AUTO: 12.4 FL (ref 7.4–10.4)
POTASSIUM SERPL-SCNC: 3.8 MMOL/L (ref 3.5–5.1)
PROT SERPL-MCNC: 6.9 GM/DL (ref 5.8–7.6)
PROT UR QL STRIP.AUTO: NEGATIVE MG/DL
PROTHROMBIN TIME: 13.7 SECONDS (ref 11.7–14.5)
RBC # BLD AUTO: 4.59 X10(6)/MCL (ref 4.7–6.1)
RBC #/AREA URNS AUTO: ABNORMAL /HPF
RBC UR QL AUTO: ABNORMAL UNIT/L
RSV A 5' UTR RNA NPH QL NAA+PROBE: NOT DETECTED
SARS-COV-2 RNA RESP QL NAA+PROBE: NOT DETECTED
SODIUM SERPL-SCNC: 142 MMOL/L (ref 136–145)
SP GR UR STRIP.AUTO: >=1.03
SQUAMOUS #/AREA URNS AUTO: ABNORMAL /HPF
TROPONIN I SERPL-MCNC: <0.01 NG/ML (ref 0–0.04)
UROBILINOGEN UR STRIP-ACNC: 0.2 MG/DL
WBC # SPEC AUTO: 12.11 X10(3)/MCL (ref 4.5–11.5)
WBC #/AREA URNS AUTO: ABNORMAL /HPF

## 2023-05-30 PROCEDURE — 85025 COMPLETE CBC W/AUTO DIFF WBC: CPT | Performed by: EMERGENCY MEDICINE

## 2023-05-30 PROCEDURE — 25000003 PHARM REV CODE 250: Performed by: EMERGENCY MEDICINE

## 2023-05-30 PROCEDURE — 0241U COVID/RSV/FLU A&B PCR: CPT | Performed by: EMERGENCY MEDICINE

## 2023-05-30 PROCEDURE — 87088 URINE BACTERIA CULTURE: CPT | Performed by: EMERGENCY MEDICINE

## 2023-05-30 PROCEDURE — 82550 ASSAY OF CK (CPK): CPT | Performed by: EMERGENCY MEDICINE

## 2023-05-30 PROCEDURE — 84484 ASSAY OF TROPONIN QUANT: CPT | Performed by: EMERGENCY MEDICINE

## 2023-05-30 PROCEDURE — 83605 ASSAY OF LACTIC ACID: CPT | Performed by: EMERGENCY MEDICINE

## 2023-05-30 PROCEDURE — 93010 EKG 12-LEAD: ICD-10-PCS | Mod: ,,, | Performed by: INTERNAL MEDICINE

## 2023-05-30 PROCEDURE — 93010 ELECTROCARDIOGRAM REPORT: CPT | Mod: ,,, | Performed by: INTERNAL MEDICINE

## 2023-05-30 PROCEDURE — 80053 COMPREHEN METABOLIC PANEL: CPT | Performed by: EMERGENCY MEDICINE

## 2023-05-30 PROCEDURE — 63600175 PHARM REV CODE 636 W HCPCS: Performed by: EMERGENCY MEDICINE

## 2023-05-30 PROCEDURE — 87040 BLOOD CULTURE FOR BACTERIA: CPT | Performed by: EMERGENCY MEDICINE

## 2023-05-30 PROCEDURE — 81001 URINALYSIS AUTO W/SCOPE: CPT | Performed by: EMERGENCY MEDICINE

## 2023-05-30 PROCEDURE — 96361 HYDRATE IV INFUSION ADD-ON: CPT

## 2023-05-30 PROCEDURE — 85730 THROMBOPLASTIN TIME PARTIAL: CPT | Performed by: EMERGENCY MEDICINE

## 2023-05-30 PROCEDURE — 83735 ASSAY OF MAGNESIUM: CPT | Performed by: EMERGENCY MEDICINE

## 2023-05-30 PROCEDURE — 99285 EMERGENCY DEPT VISIT HI MDM: CPT | Mod: 25

## 2023-05-30 PROCEDURE — 96365 THER/PROPH/DIAG IV INF INIT: CPT

## 2023-05-30 PROCEDURE — 85610 PROTHROMBIN TIME: CPT | Performed by: EMERGENCY MEDICINE

## 2023-05-30 PROCEDURE — 87077 CULTURE AEROBIC IDENTIFY: CPT | Performed by: EMERGENCY MEDICINE

## 2023-05-30 PROCEDURE — 93005 ELECTROCARDIOGRAM TRACING: CPT

## 2023-05-30 RX ORDER — BRIMONIDINE TARTRATE 1 MG/ML
1 SOLUTION/ DROPS OPHTHALMIC 3 TIMES DAILY
COMMUNITY
End: 2023-09-18 | Stop reason: SDUPTHER

## 2023-05-30 RX ORDER — CEPHALEXIN 500 MG/1
500 CAPSULE ORAL 4 TIMES DAILY
Qty: 28 CAPSULE | Refills: 0 | Status: SHIPPED | OUTPATIENT
Start: 2023-05-30 | End: 2023-06-06

## 2023-05-30 RX ORDER — ALPRAZOLAM 0.25 MG/1
0.25 TABLET ORAL
Status: COMPLETED | OUTPATIENT
Start: 2023-05-30 | End: 2023-05-30

## 2023-05-30 RX ORDER — ALPRAZOLAM 0.25 MG/1
0.25 TABLET ORAL 2 TIMES DAILY PRN
Qty: 10 TABLET | Refills: 0 | Status: SHIPPED | OUTPATIENT
Start: 2023-05-30

## 2023-05-30 RX ORDER — LATANOPROSTENE BUNOD 0.24 MG/ML
SOLUTION/ DROPS OPHTHALMIC
COMMUNITY

## 2023-05-30 RX ORDER — CARBIDOPA AND LEVODOPA 25; 100 MG/1; MG/1
1 TABLET ORAL 2 TIMES DAILY
COMMUNITY
Start: 2023-05-24

## 2023-05-30 RX ORDER — SODIUM CHLORIDE 9 MG/ML
1000 INJECTION, SOLUTION INTRAVENOUS
Status: COMPLETED | OUTPATIENT
Start: 2023-05-30 | End: 2023-05-30

## 2023-05-30 RX ORDER — AMANTADINE HYDROCHLORIDE 100 MG/1
100 CAPSULE, GELATIN COATED ORAL 2 TIMES DAILY
COMMUNITY
Start: 2023-05-03

## 2023-05-30 RX ADMIN — SODIUM CHLORIDE 500 ML: 9 INJECTION, SOLUTION INTRAVENOUS at 03:05

## 2023-05-30 RX ADMIN — ALPRAZOLAM 0.25 MG: 0.25 TABLET ORAL at 04:05

## 2023-05-30 RX ADMIN — CEFTRIAXONE SODIUM 1 G: 1 INJECTION, POWDER, FOR SOLUTION INTRAMUSCULAR; INTRAVENOUS at 05:05

## 2023-05-30 NOTE — ED TRIAGE NOTES
Pt instructed to go to ed from OT, due to lower BP 85/50 today with family relating that pt appears generally weaker that normal

## 2023-05-30 NOTE — ED PROVIDER NOTES
Encounter Date: 5/30/2023       History     Chief Complaint   Patient presents with    Hypotension     Pt instructed to go to ed from OT, due to lower BP 85/50 today with family relating that pt appears generally weaker that normal     Patient is a 66 yo M presenting with low blood pressure. Family who cares for him has noticed it has been slightly lower the past few days and then today they brought him to the  and it was 85 systolic and they told family to bring him here. Family has noticed he's been a l little more subdued lately but he's also been out of his xanax 0.25mg and hasn't been sleeping very well. No fevers. Family says he has been complaining of headaches more frequently. No vomiting, diarrhea, or otherwise noted abnormality. The patient has no complaints at this time. He has otherwise bene compliance with his medications.       Review of patient's allergies indicates:   Allergen Reactions    Fish containing products     Shellfish containing products     Tramadol      Past Medical History:   Diagnosis Date    Parkinson's disease     Rhabdomyolysis     Unspecified glaucoma      Past Surgical History:   Procedure Laterality Date    ABDOMINAL SURGERY      CHOLECYSTECTOMY      FOOT SURGERY       Family History   Family history unknown: Yes     Social History     Tobacco Use    Smoking status: Unknown     Review of Systems   Constitutional:  Positive for fatigue. Negative for fever.   HENT:  Negative for sore throat.    Respiratory:  Negative for shortness of breath.    Cardiovascular:  Negative for chest pain.   Gastrointestinal:  Negative for nausea.   Genitourinary:  Negative for dysuria.   Musculoskeletal:  Negative for back pain.   Skin:  Negative for rash.   Neurological:  Negative for weakness.   Hematological:  Does not bruise/bleed easily.     Physical Exam     Initial Vitals [05/30/23 1436]   BP Pulse Resp Temp SpO2   (!) 105/59 65 18 98.6 °F (37 °C) 100 %      MAP       --         Physical  Exam    Nursing note and vitals reviewed.  Constitutional: He appears well-developed and well-nourished. He is not diaphoretic. No distress.   HENT:   Head: Normocephalic and atraumatic.   Eyes: Conjunctivae are normal.   Neck: Neck supple.   Cardiovascular:  Normal rate, regular rhythm and normal heart sounds.           Pulmonary/Chest: Breath sounds normal. No respiratory distress. He has no wheezes. He has no rhonchi.   Abdominal: Abdomen is soft. Bowel sounds are normal. He exhibits no distension. There is no abdominal tenderness. There is no rebound and no guarding.   Musculoskeletal:         General: No edema. Normal range of motion.      Cervical back: Neck supple.     Neurological: He is alert and oriented to person, place, and time.   Skin: Skin is warm and dry.   Psychiatric: He has a normal mood and affect. Thought content normal.       ED Course   Procedures  Labs Reviewed   CULTURE, URINE - Abnormal; Notable for the following components:       Result Value    Urine Culture >/= 100,000 colonies/ml Escherichia coli (*)     All other components within normal limits   COMPREHENSIVE METABOLIC PANEL - Abnormal; Notable for the following components:    Chloride 110 (*)     Carbon Dioxide 22 (*)     Albumin/Globulin Ratio 1.0 (*)     Alkaline Phosphatase 321 (*)     All other components within normal limits   PROTIME-INR - Abnormal; Notable for the following components:    INR 1.04 (*)     All other components within normal limits   URINALYSIS, REFLEX TO URINE CULTURE - Abnormal; Notable for the following components:    Appearance, UA Hazy (*)     Blood, UA Small (*)     Nitrites, UA Positive (*)     Leukocyte Esterase, UA Moderate (*)     All other components within normal limits   CBC WITH DIFFERENTIAL - Abnormal; Notable for the following components:    WBC 12.11 (*)     RBC 4.59 (*)     MCV 95.9 (*)     MCH 31.8 (*)     MPV 12.4 (*)     IG# 0.08 (*)     All other components within normal limits    URINALYSIS, MICROSCOPIC - Abnormal; Notable for the following components:    Bacteria, UA Many (*)     Mucous, UA Moderate (*)     RBC, UA 11-20 (*)     WBC, UA 21-50 (*)     All other components within normal limits   BLOOD CULTURE OLG - Normal   BLOOD CULTURE OLG - Normal   CK - Normal   APTT - Normal   TROPONIN I - Normal   LACTIC ACID, PLASMA - Normal   COVID/RSV/FLU A&B PCR - Normal    Narrative:     The Xpert Xpress SARS-CoV-2/FLU/RSV plus is a rapid, multiplexed real-time PCR test intended for the simultaneous qualitative detection and differentiation of SARS-CoV-2, Influenza A, Influenza B, and respiratory syncytial virus (RSV) viral RNA in either nasopharyngeal swab or nasal swab specimens.         MAGNESIUM - Normal   CBC W/ AUTO DIFFERENTIAL    Narrative:     The following orders were created for panel order CBC auto differential.  Procedure                               Abnormality         Status                     ---------                               -----------         ------                     CBC with Differential[[id number]]        Abnormal            Final result                 Please view results for these tests on the individual orders.     EKG Readings: (Independently Interpreted)   EKG Interpretation 2:55 PM    Rate: 62  Rhythm: NSR  QRS: WNL  Qtc: WNL  L anterior fascicular block is old. No changes in morphology from EKG from 3/10/23     ECG Results              EKG 12-lead (Final result)  Result time 05/31/23 14:46:20      Final result by Interface, [facility] (05/31/23 14:46:20)                   Narrative:    Test Reason : I95.9,    Vent. Rate : 062 BPM     Atrial Rate : 062 BPM     P-R Int : 146 ms          QRS Dur : 078 ms      QT Int : 398 ms       P-R-T Axes : 075 -49 037 degrees     QTc Int : 403 ms    Normal sinus rhythm  Left anterior fascicular block  Abnormal ECG  No previous ECGs available  Confirmed by Primo Wing MD ([id number]) on 5/31/2023 2:46:15 PM    Referred By:  [email or web address]   SELF           Confirmed By:Primo Wing MD                                  Imaging Results              X-Ray Chest AP Portable (Final result)  Result time 05/30/23 16:17:07      Final result by Primitivo Hernández MD (05/30/23 16:17:07)                   Impression:      No acute findings.      Electronically signed by: Primitivo Hernández  Date:    05/30/2023  Time:    16:17               Narrative:    EXAMINATION:  XR CHEST AP PORTABLE    CLINICAL HISTORY:  Hypotension, unspecified    COMPARISON:  11 March 2023    FINDINGS:  Portable frontal view of the chest was obtained. The heart is not enlarged.  Lungs are clear.  There is no pneumothorax or significant effusion.                                       CT Head Without Contrast (Final result)  Result time 05/30/23 15:49:42      Final result by Elliot Matthews MD (05/30/23 15:49:42)                   Narrative:    EXAMINATION  CT HEAD WITHOUT CONTRAST    CLINICAL HISTORY  Headache, new or worsening (Age >= 50y);    TECHNIQUE  Axial non-contrast CT images of the head were acquired and multiplanar reconstructions accomplished by a CT technologist at a separate workstation, pushed to PACS for physician review.    COMPARISON  10 March 2023    FINDINGS  Images were reviewed in subdural, brain, soft tissue, and bone windows.    Exam quality: Motion/streak artifact limits assessment of the posterior fossa.    Hemorrhage: No evidence of acute hyperattenuating blood products.    Parenchyma: There is similar appearance of diffuse bilateral supratentorial white matter hypoattenuation, typical of chronic microvascular changes. No discrete mass, mass effect, or CT evidence of acute large vascular territory insult. Gray-white differentiation is preserved.    Midline shift: None.    CSF spaces: Proportional appearance of ventricular and sulcal enlargement. No hydrocephalus. No masses or fluid collections.    Vasculature: No focally hyperdense artery. Scattered carotid  siphon calcifications are present. No abnormal densities within the dural sinuses.    Other findings: No abnormalities of the scalp or subjacent osseous structures. Mastoids are well aerated. No focal abnormality of the sella. The included facial structures are unremarkable.    IMPRESSION  1. No convincing acute intracranial abnormality.  2. Additional secondary details discussed above, relatively unchanged from the 10 March 2023 appearance.    RADIATION DOSE  Automated tube current modulation, weight-based exposure dosing, and/or iterative reconstruction technique utilized to reach lowest reasonably achievable exposure rate.    DLP: 838 mGy*cm      Electronically signed by: Elliot Matthews  Date:    05/30/2023  Time:    15:49                                     Medications   0.9%  NaCl infusion (0 mLs Intravenous Stopped 5/30/23 1634)   ALPRAZolam tablet 0.25 mg (0.25 mg Oral Given 5/30/23 1648)   cefTRIAXone (ROCEPHIN) 1 g in dextrose 5 % in water (D5W) 5 % 50 mL IVPB (MB+) (0 g Intravenous Stopped 5/30/23 1805)     Medical Decision Making:   Results discussed with patient and family. They are comfortable with plan to treat UTI at home. Discussed signs and symptoms to return for.           ED Course as of 06/02/23 0917   Tue May 30, 2023   1629 Dispo pending UA. BP has been stable here. Most recent 130/79 but he still hasn't urinated.  [GM]   1729 Patient doesn't meet SIRS. His BP has responded well to treatment. Will give first dose of antibiotics here and d/c on medication [GM]      ED Course User Index  [GM] Katarina Hebert MD                 Clinical Impression:   Final diagnoses:  [I95.9] Hypotension  [E86.0] Dehydration (Primary)  [N30.00] Acute cystitis without hematuria        ED Disposition Condition    Discharge Stable          ED Prescriptions       Medication Sig Dispense Start Date End Date Auth. Provider    ALPRAZolam (XANAX) 0.25 MG tablet Take 1 tablet (0.25 mg total) by mouth 2 (two) times daily as  needed for Anxiety. 10 tablet 5/30/2023 -- Katarina Hebert MD    cephALEXin (KEFLEX) 500 MG capsule Take 1 capsule (500 mg total) by mouth 4 (four) times daily. for 7 days 28 capsule 5/30/2023 6/6/2023 Katarina Hebert MD          Follow-up Information       Follow up With Specialties Details Why Contact Info    Joel Castillo MD Internal Medicine In 1 day If symptoms worsen, return to the  [facility]  [place] [id number]  [phone]               Katarina Hebert MD  06/02/23 0919       Katarina Hebert MD  06/27/23 [id number]

## 2023-06-01 LAB — BACTERIA UR CULT: ABNORMAL

## 2023-06-04 LAB
BACTERIA BLD CULT: NORMAL
BACTERIA BLD CULT: NORMAL

## 2023-07-18 ENCOUNTER — HOSPITAL ENCOUNTER (INPATIENT)
Facility: HOSPITAL | Age: 68
LOS: 6 days | Discharge: NURSING, REHAB OR LONG-TERM CARE | DRG: 057 | End: 2023-07-24
Attending: EMERGENCY MEDICINE | Admitting: INTERNAL MEDICINE
Payer: MEDICARE

## 2023-07-18 DIAGNOSIS — S01.81XA LACERATION OF FOREHEAD, INITIAL ENCOUNTER: ICD-10-CM

## 2023-07-18 DIAGNOSIS — G20.A1 PARKINSON'S DISEASE: ICD-10-CM

## 2023-07-18 DIAGNOSIS — R29.6 FREQUENT FALLS: ICD-10-CM

## 2023-07-18 DIAGNOSIS — G20.A1 PARKINSON DISEASE: Primary | ICD-10-CM

## 2023-07-18 DIAGNOSIS — N30.00 ACUTE CYSTITIS WITHOUT HEMATURIA: ICD-10-CM

## 2023-07-18 LAB
ALBUMIN SERPL-MCNC: 3.5 G/DL (ref 3.4–4.8)
ALBUMIN/GLOB SERPL: 1.1 RATIO (ref 1.1–2)
ALP SERPL-CCNC: 205 UNIT/L (ref 40–150)
ALT SERPL-CCNC: 5 UNIT/L (ref 0–55)
APPEARANCE UR: ABNORMAL
APTT PPP: 24.2 SECONDS (ref 23.2–33.7)
AST SERPL-CCNC: 19 UNIT/L (ref 5–34)
BACTERIA #/AREA URNS AUTO: ABNORMAL /HPF
BASOPHILS # BLD AUTO: 0.06 X10(3)/MCL
BASOPHILS NFR BLD AUTO: 0.7 %
BILIRUB UR QL STRIP.AUTO: ABNORMAL
BILIRUBIN DIRECT+TOT PNL SERPL-MCNC: 1 MG/DL
BUN SERPL-MCNC: 25.4 MG/DL (ref 8.4–25.7)
CALCIUM SERPL-MCNC: 8.7 MG/DL (ref 8.8–10)
CAOX CRY URNS QL MICRO: ABNORMAL /HPF
CHLORIDE SERPL-SCNC: 109 MMOL/L (ref 98–107)
CO2 SERPL-SCNC: 24 MMOL/L (ref 23–31)
COLOR UR: ABNORMAL
CREAT SERPL-MCNC: 0.81 MG/DL (ref 0.73–1.18)
EOSINOPHIL # BLD AUTO: 0.14 X10(3)/MCL (ref 0–0.9)
EOSINOPHIL NFR BLD AUTO: 1.5 %
ERYTHROCYTE [DISTWIDTH] IN BLOOD BY AUTOMATED COUNT: 15.2 % (ref 11.5–17)
GFR SERPLBLD CREATININE-BSD FMLA CKD-EPI: >60 MLS/MIN/1.73/M2
GLOBULIN SER-MCNC: 3.1 GM/DL (ref 2.4–3.5)
GLUCOSE SERPL-MCNC: 97 MG/DL (ref 82–115)
GLUCOSE UR QL STRIP.AUTO: NEGATIVE
HCT VFR BLD AUTO: 32.9 % (ref 42–52)
HGB BLD-MCNC: 10.5 G/DL (ref 14–18)
IMM GRANULOCYTES # BLD AUTO: 0.12 X10(3)/MCL (ref 0–0.04)
IMM GRANULOCYTES NFR BLD AUTO: 1.3 %
INR BLD: 1.12 (ref 0–1.3)
KETONES UR QL STRIP.AUTO: ABNORMAL
LEUKOCYTE ESTERASE UR QL STRIP.AUTO: ABNORMAL
LYMPHOCYTES # BLD AUTO: 0.99 X10(3)/MCL (ref 0.6–4.6)
LYMPHOCYTES NFR BLD AUTO: 10.7 %
MCH RBC QN AUTO: 30.7 PG (ref 27–31)
MCHC RBC AUTO-ENTMCNC: 31.9 G/DL (ref 33–36)
MCV RBC AUTO: 96.2 FL (ref 80–94)
MONOCYTES # BLD AUTO: 0.79 X10(3)/MCL (ref 0.1–1.3)
MONOCYTES NFR BLD AUTO: 8.6 %
NEUTROPHILS # BLD AUTO: 7.12 X10(3)/MCL (ref 2.1–9.2)
NEUTROPHILS NFR BLD AUTO: 77.2 %
NITRITE UR QL STRIP.AUTO: NEGATIVE
NRBC BLD AUTO-RTO: 0 %
PH UR STRIP.AUTO: 6 [PH]
PLATELET # BLD AUTO: 432 X10(3)/MCL (ref 130–400)
PMV BLD AUTO: 10.2 FL (ref 7.4–10.4)
POTASSIUM SERPL-SCNC: 3.9 MMOL/L (ref 3.5–5.1)
PROT SERPL-MCNC: 6.6 GM/DL (ref 5.8–7.6)
PROT UR QL STRIP.AUTO: ABNORMAL
PROTHROMBIN TIME: 14.3 SECONDS (ref 12.5–14.5)
RBC # BLD AUTO: 3.42 X10(6)/MCL (ref 4.7–6.1)
RBC #/AREA URNS AUTO: ABNORMAL /HPF
RBC UR QL AUTO: NEGATIVE
SODIUM SERPL-SCNC: 142 MMOL/L (ref 136–145)
SP GR UR STRIP.AUTO: >=1.04 (ref 1–1.03)
SQUAMOUS #/AREA URNS AUTO: ABNORMAL /HPF
UROBILINOGEN UR STRIP-ACNC: 1
WBC # SPEC AUTO: 9.22 X10(3)/MCL (ref 4.5–11.5)
WBC #/AREA URNS AUTO: >100 /HPF

## 2023-07-18 PROCEDURE — 81001 URINALYSIS AUTO W/SCOPE: CPT | Performed by: EMERGENCY MEDICINE

## 2023-07-18 PROCEDURE — 99223 PR INITIAL HOSPITAL CARE,LEVL III: ICD-10-PCS | Mod: ,,, | Performed by: PSYCHIATRY & NEUROLOGY

## 2023-07-18 PROCEDURE — 99285 EMERGENCY DEPT VISIT HI MDM: CPT | Mod: 25

## 2023-07-18 PROCEDURE — 85610 PROTHROMBIN TIME: CPT | Performed by: EMERGENCY MEDICINE

## 2023-07-18 PROCEDURE — 63600175 PHARM REV CODE 636 W HCPCS: Performed by: EMERGENCY MEDICINE

## 2023-07-18 PROCEDURE — 25000003 PHARM REV CODE 250: Performed by: EMERGENCY MEDICINE

## 2023-07-18 PROCEDURE — 96365 THER/PROPH/DIAG IV INF INIT: CPT

## 2023-07-18 PROCEDURE — 25000003 PHARM REV CODE 250

## 2023-07-18 PROCEDURE — 99223 1ST HOSP IP/OBS HIGH 75: CPT | Mod: ,,, | Performed by: PSYCHIATRY & NEUROLOGY

## 2023-07-18 PROCEDURE — 85730 THROMBOPLASTIN TIME PARTIAL: CPT | Performed by: EMERGENCY MEDICINE

## 2023-07-18 PROCEDURE — 12013 RPR F/E/E/N/L/M 2.6-5.0 CM: CPT

## 2023-07-18 PROCEDURE — 85025 COMPLETE CBC W/AUTO DIFF WBC: CPT | Performed by: EMERGENCY MEDICINE

## 2023-07-18 PROCEDURE — 80053 COMPREHEN METABOLIC PANEL: CPT | Performed by: EMERGENCY MEDICINE

## 2023-07-18 PROCEDURE — 87088 URINE BACTERIA CULTURE: CPT | Performed by: EMERGENCY MEDICINE

## 2023-07-18 PROCEDURE — 25000003 PHARM REV CODE 250: Performed by: STUDENT IN AN ORGANIZED HEALTH CARE EDUCATION/TRAINING PROGRAM

## 2023-07-18 PROCEDURE — 21400001 HC TELEMETRY ROOM

## 2023-07-18 PROCEDURE — 11000001 HC ACUTE MED/SURG PRIVATE ROOM

## 2023-07-18 RX ORDER — CIPROFLOXACIN 2 MG/ML
400 INJECTION, SOLUTION INTRAVENOUS
Status: COMPLETED | OUTPATIENT
Start: 2023-07-18 | End: 2023-07-18

## 2023-07-18 RX ORDER — TALC
6 POWDER (GRAM) TOPICAL NIGHTLY PRN
Status: DISCONTINUED | OUTPATIENT
Start: 2023-07-18 | End: 2023-07-24 | Stop reason: HOSPADM

## 2023-07-18 RX ORDER — ALPRAZOLAM 0.25 MG/1
0.25 TABLET ORAL 2 TIMES DAILY PRN
Status: DISCONTINUED | OUTPATIENT
Start: 2023-07-18 | End: 2023-07-24 | Stop reason: HOSPADM

## 2023-07-18 RX ORDER — SODIUM CHLORIDE 0.9 % (FLUSH) 0.9 %
10 SYRINGE (ML) INJECTION
Status: DISCONTINUED | OUTPATIENT
Start: 2023-07-18 | End: 2023-07-24 | Stop reason: HOSPADM

## 2023-07-18 RX ORDER — ACETAMINOPHEN 325 MG/1
650 TABLET ORAL EVERY 8 HOURS PRN
Status: DISCONTINUED | OUTPATIENT
Start: 2023-07-18 | End: 2023-07-24 | Stop reason: HOSPADM

## 2023-07-18 RX ORDER — CARBIDOPA AND LEVODOPA 25; 100 MG/1; MG/1
1 TABLET ORAL 2 TIMES DAILY
Status: DISCONTINUED | OUTPATIENT
Start: 2023-07-18 | End: 2023-07-18

## 2023-07-18 RX ORDER — ONDANSETRON 2 MG/ML
4 INJECTION INTRAMUSCULAR; INTRAVENOUS EVERY 8 HOURS PRN
Status: DISCONTINUED | OUTPATIENT
Start: 2023-07-18 | End: 2023-07-24 | Stop reason: HOSPADM

## 2023-07-18 RX ORDER — CARBIDOPA AND LEVODOPA 25; 100 MG/1; MG/1
1 TABLET ORAL 4 TIMES DAILY
Status: DISCONTINUED | OUTPATIENT
Start: 2023-07-18 | End: 2023-07-24 | Stop reason: HOSPADM

## 2023-07-18 RX ORDER — MIDODRINE HYDROCHLORIDE 5 MG/1
10 TABLET ORAL 2 TIMES DAILY WITH MEALS
Status: DISCONTINUED | OUTPATIENT
Start: 2023-07-18 | End: 2023-07-24 | Stop reason: HOSPADM

## 2023-07-18 RX ORDER — LIDOCAINE HYDROCHLORIDE 10 MG/ML
5 INJECTION INFILTRATION; PERINEURAL
Status: COMPLETED | OUTPATIENT
Start: 2023-07-18 | End: 2023-07-18

## 2023-07-18 RX ADMIN — Medication 6 MG: at 09:07

## 2023-07-18 RX ADMIN — CARBIDOPA AND LEVODOPA 1 TABLET: 25; 100 TABLET ORAL at 04:07

## 2023-07-18 RX ADMIN — AMANTADINE HYDROCHLORIDE 100 MG: 100 CAPSULE, LIQUID FILLED ORAL at 09:07

## 2023-07-18 RX ADMIN — CARBIDOPA AND LEVODOPA 1 TABLET: 25; 100 TABLET ORAL at 09:07

## 2023-07-18 RX ADMIN — MIDODRINE HYDROCHLORIDE 10 MG: 5 TABLET ORAL at 09:07

## 2023-07-18 RX ADMIN — LIDOCAINE HYDROCHLORIDE 5 ML: 10 INJECTION, SOLUTION INFILTRATION; PERINEURAL at 09:07

## 2023-07-18 RX ADMIN — CIPROFLOXACIN 400 MG: 400 INJECTION, SOLUTION INTRAVENOUS at 09:07

## 2023-07-18 NOTE — CONSULTS
[facility] - Emergency Dept  Neurology  Consult Note    Patient Name: Cristopher Farooq  MRN: [id number]  Admission Date: 7/18/2023  Hospital Length of Stay: 0 days  Code Status: Full Code   Attending Provider: Joel Castillo,*   Consulting Provider: Michelle Domingo AGACNP-BC  Primary Care Physician: Joel Castillo MD  Principal Problem:<principal problem not specified>    Inpatient consult to Neurology  Consult performed by: ROLAN Yanez  Consult ordered by: Jaime Romero MD         Subjective:     Chief Complaint:       HPI:   67-year-old male with PMH Parkinson's disease glaucoma with vision loss who presented to ED on 07/18 following a fall while getting out of bed this morning and subsequently strike his head on the floor.  Patient did not lose consciousness.  Patient reportedly having worsening Parkinson's symptoms, including increased falls over the last week.  Of note, patient recently noted to have UTI o/p and was started on ciprofloxacin by his PCP.    CT head without negative for acute intracranial abnormalities.  Labs unremarkable.    Patient's neurologist Dr. South. Recent o/p visit where Sinemet 25/100 mg 1 tab was increased from TID to QID, which has been resumed.       Past Medical History:   Diagnosis Date    Parkinson's disease     Rhabdomyolysis     Unspecified glaucoma        Past Surgical History:   Procedure Laterality Date    ABDOMINAL SURGERY      CHOLECYSTECTOMY      FOOT SURGERY         Review of patient's allergies indicates:   Allergen Reactions    Fish containing products     Shellfish containing products     Tramadol        Current Neurological Medications:     No current facility-administered medications on file prior to encounter.     Current Outpatient Medications on File Prior to Encounter   Medication Sig    ALPRAZolam (XANAX) 0.25 MG tablet Take 1 tablet (0.25 mg total) by mouth 2 (two) times daily as needed for Anxiety.    amantadine HCL  (SYMMETREL) 100 mg capsule Take 100 mg by mouth 2 (two) times daily.    brimonidine 0.1% (ALPHAGAN P) 0.1 % Drop Place 1 drop into both eyes 3 (three) times daily.    carbidopa-levodopa  mg (SINEMET)  mg per tablet Take 1 tablet by mouth 2 (two) times daily.    latanoprostene bunod (VYZULTA) 0.024 % Drop Apply to eye.    midodrine (PROAMATINE) 10 MG tablet Take 1 tablet (10 mg total) by mouth 2 (two) times daily with meals.     Family History       Family history is unknown by patient.          Tobacco Use    Smoking status: Unknown    Smokeless tobacco: Not on file   Substance and Sexual Activity    Alcohol use: Not on file    Drug use: Not on file    Sexual activity: Not on file     Review of Systems   Unable to perform ROS: Dementia .    Objective:     Vital Signs (Most Recent):  Temp: 97.9 °F (36.6 °C) (07/18/23 0757)  Pulse: 64 (07/18/23 1112)  Resp: 17 (07/18/23 1112)  BP: 114/74 (07/18/23 1112)  SpO2: 100 % (07/18/23 1112) Vital Signs (24h Range):  Temp:  [97.9 °F (36.6 °C)] 97.9 °F (36.6 °C)  Pulse:  [64-80] 64  Resp:  [11-17] 17  SpO2:  [98 %-100 %] 100 %  BP: (113-118)/(68-74) 114/74     Weight: 77.1 kg (170 lb)  Body mass index is 27.44 kg/m².     Physical Exam  Vitals reviewed.   Constitutional:       General: He is awake.      Comments: Hypomimia noted   HENT:      Head: Normocephalic.      Comments: R frontal head laceration with sutures intact       Nose: Nose normal.      Mouth/Throat:      Mouth: Mucous membranes are moist.      Pharynx: Oropharynx is clear.   Eyes:      Extraocular Movements: Extraocular movements intact and EOM normal.      Pupils: Pupils are equal, round, and reactive to light.   Psychiatric:         Speech: Speech normal.         Behavior: Behavior is cooperative.        NEUROLOGICAL EXAMINATION:     MENTAL STATUS   Oriented to person.   Oriented to place.   Follows 1 step commands.   Attention: decreased. Concentration: decreased.   Speech: speech is normal    Level of consciousness: alert  Knowledge: poor.        Disoriented to elements of situation     CRANIAL NERVES     CN II   Visual acuity: decreased    CN III, IV, VI   Pupils are equal, round, and reactive to light.  Extraocular motions are normal.   Nystagmus: none   Conjugate gaze: present    CN V   Facial sensation intact.     CN VII   Facial expression full, symmetric.     MOTOR EXAM   Overall muscle tone: increased  Right arm tone: cogwheel rigidity  Left arm tone: cogwheel rigidity       Moves all extremities purposefully and antigravity     SENSORY EXAM   Light touch normal.     GAIT AND COORDINATION     Tremor   Resting tremor: absent    Significant Labs:   Recent Lab Results         07/18/23  0844        Albumin/Globulin Ratio 1.1       Albumin 3.5       Alkaline Phosphatase 205       ALT 5       aPTT 24.2  Comment: For Minimal Heparin Infusion, the goal aPTT 64-85 seconds corresponds to an anti-Xa of 0.3-0.5.    For Low Intensity and High Intensity Heparin, the goal aPTT  seconds corresponds to an anti-Xa of 0.3-0.7       AST 19       Baso # 0.06       Basophil % 0.7       BILIRUBIN TOTAL 1.0       BUN 25.4       Calcium 8.7       Chloride 109       CO2 24       Creatinine 0.81       eGFR >60       Eos # 0.14       Eosinophil % 1.5       Globulin, Total 3.1       Glucose 97       Hematocrit 32.9       Hemoglobin 10.5       Immature Grans (Abs) 0.12       Immature Granulocytes 1.3       INR 1.12       Lymph # 0.99       LYMPH % 10.7       MCH 30.7       MCHC 31.9       MCV 96.2       Mono # 0.79       Mono % 8.6       MPV 10.2       Neut # 7.12       Neut % 77.2       nRBC 0.0       Platelets 432       Potassium 3.9       PROTEIN TOTAL 6.6       Protime 14.3       RBC 3.42       RDW 15.2       Sodium 142       WBC 9.22               Significant Imaging:   CT head w/o 7/18/2023:  FINDINGS:  There is no acute intracranial hemorrhage, midline shift, mass-effect, or extra-axial collection.  The  ventricles and sulci are globally proportional prominent compatible with moderate involutional changes.  There are mild patchy areas of decreased attenuation in the periventricular subcortical white matter, while nonspecific, most commonly sequela of chronic microvascular ischemia.  No sulcal effacement.  Gray-white matter differentiation is preserved.  There is right frontal scalp soft tissue injury and swelling evident.  The visualized osseous structures are intact.  The visualized paranasal sinuses and mastoid air cells are clear.     Impression:     No acute intracranial abnormality     Chronic changes as above     Right frontal scalp injury and swelling.       I have reviewed all pertinent imaging results/findings within the past 24 hours.    Assessment and Plan:     Parkinson's disease  With recurring falls    -home sinemet resumed  -fall precautions          VTE Risk Mitigation (From admission, onward)         Ordered     Place DEBRA hose  Until discontinued         07/18/23 1220     IP VTE LOW RISK PATIENT  Once         07/18/23 1220                Thank you for your consult. Further recommendations to follow per MD Michelle Domingo, [facility]  Inpatient Neurology  [facility] - Emergency Dept

## 2023-07-18 NOTE — HPI
67-year-old male with PMH Parkinson's disease glaucoma with vision loss who presented to ED on 07/18 following a fall while getting out of bed this morning and subsequently strike his head on the floor.  Patient did not lose consciousness.  Patient reportedly having worsening Parkinson's symptoms, including increased falls over the last week.  Of note, patient recently noted to have UTI o/p and was started on ciprofloxacin by his PCP.    CT head without negative for acute intracranial abnormalities.  Labs unremarkable.    Patient's neurologist Dr. South. Recent o/p visit where Sinemet 25/100 mg 1 tab was increased from TID to QID, which has been resumed.

## 2023-07-18 NOTE — PT/OT/SLP PROGRESS
SLP attempting clinical swallowing evaluation, however pt receiving ADLs from nursing. SLP to continue to follow and treat as appropriate.

## 2023-07-18 NOTE — SUBJECTIVE & OBJECTIVE
Past Medical History:   Diagnosis Date    Parkinson's disease     Rhabdomyolysis     Unspecified glaucoma        Past Surgical History:   Procedure Laterality Date    ABDOMINAL SURGERY      CHOLECYSTECTOMY      FOOT SURGERY         Review of patient's allergies indicates:   Allergen Reactions    Fish containing products     Shellfish containing products     Tramadol        Current Neurological Medications:     No current facility-administered medications on file prior to encounter.     Current Outpatient Medications on File Prior to Encounter   Medication Sig    ALPRAZolam (XANAX) 0.25 MG tablet Take 1 tablet (0.25 mg total) by mouth 2 (two) times daily as needed for Anxiety.    amantadine HCL (SYMMETREL) 100 mg capsule Take 100 mg by mouth 2 (two) times daily.    brimonidine 0.1% (ALPHAGAN P) 0.1 % Drop Place 1 drop into both eyes 3 (three) times daily.    carbidopa-levodopa  mg (SINEMET)  mg per tablet Take 1 tablet by mouth 2 (two) times daily.    latanoprostene bunod (VYZULTA) 0.024 % Drop Apply to eye.    midodrine (PROAMATINE) 10 MG tablet Take 1 tablet (10 mg total) by mouth 2 (two) times daily with meals.     Family History       Family history is unknown by patient.          Tobacco Use    Smoking status: Unknown    Smokeless tobacco: Not on file   Substance and Sexual Activity    Alcohol use: Not on file    Drug use: Not on file    Sexual activity: Not on file     Review of Systems   Unable to perform ROS: Dementia .    Objective:     Vital Signs (Most Recent):  Temp: 97.9 °F (36.6 °C) (07/18/23 0757)  Pulse: 64 (07/18/23 1112)  Resp: 17 (07/18/23 1112)  BP: 114/74 (07/18/23 1112)  SpO2: 100 % (07/18/23 1112) Vital Signs (24h Range):  Temp:  [97.9 °F (36.6 °C)] 97.9 °F (36.6 °C)  Pulse:  [64-80] 64  Resp:  [11-17] 17  SpO2:  [98 %-100 %] 100 %  BP: (113-118)/(68-74) 114/74     Weight: 77.1 kg (170 lb)  Body mass index is 27.44 kg/m².     Physical Exam  Vitals reviewed.   Constitutional:        General: He is awake.      Comments: Hypomimia noted   HENT:      Head: Normocephalic.      Comments: R frontal head laceration with sutures intact       Nose: Nose normal.      Mouth/Throat:      Mouth: Mucous membranes are moist.      Pharynx: Oropharynx is clear.   Eyes:      Extraocular Movements: Extraocular movements intact and EOM normal.      Pupils: Pupils are equal, round, and reactive to light.   Psychiatric:         Speech: Speech normal.         Behavior: Behavior is cooperative.        NEUROLOGICAL EXAMINATION:     MENTAL STATUS   Oriented to person.   Oriented to place.   Follows 1 step commands.   Attention: decreased. Concentration: decreased.   Speech: speech is normal   Level of consciousness: alert  Knowledge: poor.        Disoriented to elements of situation     CRANIAL NERVES     CN II   Visual acuity: decreased    CN III, IV, VI   Pupils are equal, round, and reactive to light.  Extraocular motions are normal.   Nystagmus: none   Conjugate gaze: present    CN V   Facial sensation intact.     CN VII   Facial expression full, symmetric.     MOTOR EXAM   Overall muscle tone: increased  Right arm tone: cogwheel rigidity  Left arm tone: cogwheel rigidity       Moves all extremities purposefully and antigravity     SENSORY EXAM   Light touch normal.     GAIT AND COORDINATION     Tremor   Resting tremor: absent    Significant Labs:   Recent Lab Results         07/18/23  0844        Albumin/Globulin Ratio 1.1       Albumin 3.5       Alkaline Phosphatase 205       ALT 5       aPTT 24.2  Comment: For Minimal Heparin Infusion, the goal aPTT 64-85 seconds corresponds to an anti-Xa of 0.3-0.5.    For Low Intensity and High Intensity Heparin, the goal aPTT  seconds corresponds to an anti-Xa of 0.3-0.7       AST 19       Baso # 0.06       Basophil % 0.7       BILIRUBIN TOTAL 1.0       BUN 25.4       Calcium 8.7       Chloride 109       CO2 24       Creatinine 0.81       eGFR >60       Eos # 0.14        Eosinophil % 1.5       Globulin, Total 3.1       Glucose 97       Hematocrit 32.9       Hemoglobin 10.5       Immature Grans (Abs) 0.12       Immature Granulocytes 1.3       INR 1.12       Lymph # 0.99       LYMPH % 10.7       MCH 30.7       MCHC 31.9       MCV 96.2       Mono # 0.79       Mono % 8.6       MPV 10.2       Neut # 7.12       Neut % 77.2       nRBC 0.0       Platelets 432       Potassium 3.9       PROTEIN TOTAL 6.6       Protime 14.3       RBC 3.42       RDW 15.2       Sodium 142       WBC 9.22               Significant Imaging:   CT head w/o 7/18/2023:  FINDINGS:  There is no acute intracranial hemorrhage, midline shift, mass-effect, or extra-axial collection.  The ventricles and sulci are globally proportional prominent compatible with moderate involutional changes.  There are mild patchy areas of decreased attenuation in the periventricular subcortical white matter, while nonspecific, most commonly sequela of chronic microvascular ischemia.  No sulcal effacement.  Gray-white matter differentiation is preserved.  There is right frontal scalp soft tissue injury and swelling evident.  The visualized osseous structures are intact.  The visualized paranasal sinuses and mastoid air cells are clear.     Impression:     No acute intracranial abnormality     Chronic changes as above     Right frontal scalp injury and swelling.       I have reviewed all pertinent imaging results/findings within the past 24 hours.

## 2023-07-18 NOTE — PT/OT/SLP PROGRESS
Attempted PT eval 2x, however first attempt pt was on bed pan and second attempt neurology was consulting. Will follow up as able.

## 2023-07-18 NOTE — ED PROVIDER NOTES
Encounter Date: 7/18/2023       History     Chief Complaint   Patient presents with    Fall     Roll out of bed this morning, hit head on floor. 6 cm lac to r side of head. -loc. -thinners. Denies pain on arrival. C-collar placed per precaution     67-year-old male with a history of Parkinson's disease presents the ED with family for evaluation after he rolled out of bed this morning and hit his head on the floor.  His family at bedside states that he has been having worsening Parkinson's symptoms with increased falls for the past week as well as dark foul-smelling urine prompting them to notify his PCP who ordered and urinalysis and the urine culture resulted yesterday and he was prescribed ciprofloxacin that he was not been able to take it.  He did not lose consciousness and denies any complaints.  The family mentions that a initially were trying home health to help manage his care however they feel that he will need nursing home placement and would like to look into that.  Tetanus is up-to-date  Family also reports increased difficulty swallowing    The history is provided by the patient and a relative. No  was used.   Review of patient's allergies indicates:   Allergen Reactions    Fish containing products     Shellfish containing products     Tramadol      Past Medical History:   Diagnosis Date    Parkinson's disease     Rhabdomyolysis     Unspecified glaucoma      Past Surgical History:   Procedure Laterality Date    ABDOMINAL SURGERY      CHOLECYSTECTOMY      FOOT SURGERY       Family History   Family history unknown: Yes     Social History     Tobacco Use    Smoking status: Unknown     Review of Systems   Skin:  Positive for wound.   Neurological:  Positive for tremors.     Physical Exam     Initial Vitals [07/18/23 0757]   BP Pulse Resp Temp SpO2   118/68 80 16 97.9 °F (36.6 °C) 99 %      MAP       --         Physical Exam    Nursing note and vitals reviewed.  Constitutional: He appears  well-developed and well-nourished. He is not diaphoretic. No distress.   Appears older than stated age   HENT:   Head: Normocephalic.   Nose: Nose normal.   Mouth/Throat: Oropharynx is clear and moist.   6 cm laceration to the right side of the forehead with no active bleeding and no foreign body linear   Eyes: Conjunctivae and EOM are normal. Pupils are equal, round, and reactive to light.   Neck: Trachea normal. Neck supple.   Normal range of motion.  Cardiovascular:  Normal rate, regular rhythm, normal heart sounds and intact distal pulses.     Exam reveals no gallop and no friction rub.       No murmur heard.  Pulmonary/Chest: Breath sounds normal. No respiratory distress. He has no wheezes. He has no rhonchi. He has no rales. He exhibits no tenderness.   Abdominal: Abdomen is soft. Bowel sounds are normal. He exhibits no distension and no mass. There is no abdominal tenderness. There is no rebound.   Musculoskeletal:         General: No tenderness or edema. Normal range of motion.      Cervical back: Normal range of motion and neck supple.      Lumbar back: Normal. No tenderness. Normal range of motion.     Neurological: He is alert and oriented to person, place, and time. No cranial nerve deficit or sensory deficit.   Generally weak   Skin: Skin is warm and dry. Capillary refill takes less than 2 seconds. No rash and no abscess noted. No erythema. No pallor.   Psychiatric: He has a normal mood and affect. His behavior is normal. Judgment and thought content normal.       ED Course   Lac Repair    Date/Time: 7/18/2023 8:49 AM  Performed by: Elisha Curtis MD  Authorized by: Elisha Curtis MD     Consent:     Consent obtained:  Verbal    Consent given by:  Patient and guardian    Risks, benefits, and alternatives were discussed: yes      Risks discussed:  Infection, pain, retained foreign body, need for additional repair and poor cosmetic result    Alternatives discussed:  No treatment, delayed treatment  and observation  Universal protocol:     Procedure explained and questions answered to patient or proxy's satisfaction: yes      Patient identity confirmed:  Verbally with patient  Anesthesia:     Anesthesia method:  Local infiltration    Local anesthetic:  Lidocaine 1% w/o epi  Laceration details:     Location:  Face    Face location:  Forehead    Length (cm):  6    Depth (mm):  2  Pre-procedure details:     Preparation:  Imaging obtained to evaluate for foreign bodies  Exploration:     Limited defect created (wound extended): no      Imaging outcome: foreign body not noted      Wound exploration: wound explored through full range of motion and entire depth of wound visualized      Wound extent: no fascia violation noted, no foreign bodies/material noted and no muscle damage noted      Contaminated: no    Treatment:     Area cleansed with:  Saline    Amount of cleaning:  Standard    Irrigation solution:  Sterile saline    Irrigation method:  Syringe    Visualized foreign bodies/material removed: no      Debridement:  None    Undermining:  None    Scar revision: no    Skin repair:     Repair method:  Sutures    Suture size:  5-0    Suture material:  Prolene    Suture technique:  Simple interrupted    Number of sutures:  9  Approximation:     Approximation:  Close  Repair type:     Repair type:  Simple  Post-procedure details:     Dressing:  Open (no dressing)    Procedure completion:  Tolerated well, no immediate complications  Labs Reviewed   CBC WITH DIFFERENTIAL - Abnormal; Notable for the following components:       Result Value    RBC 3.42 (*)     Hgb 10.5 (*)     Hct 32.9 (*)     MCV 96.2 (*)     MCHC 31.9 (*)     Platelet 432 (*)     IG# 0.12 (*)     All other components within normal limits   CBC W/ AUTO DIFFERENTIAL    Narrative:     The following orders were created for panel order CBC auto differential.  Procedure                               Abnormality         Status                     ---------                                -----------         ------                     CBC with Differential[[id number]]        Abnormal            Final result                 Please view results for these tests on the individual orders.   COMPREHENSIVE METABOLIC PANEL   APTT   URINALYSIS, REFLEX TO URINE CULTURE   PROTIME-INR          Imaging Results    None     Medical Decision Making  Given patient's presentation, differential diagnosis includes but is not limited to ich, skull fracture, facial laceration, uti, debility, suzanna  To evaluate these  possible etiologies cbc, cmp, coags, ua, ct head and c spine were ordered and reviewed  Family reports difficulty swallowing pills, urine culture growing bacteria susceptible to cipro, which was prescribed but not yet started by PCP.  Because of this, Iv cipro ordered.  Labs without significant abnormality, slight anemia noted. Ct head and c spine without acute abnormality, facial laceration cleaned and repaired with sutures. Tetanus is up to date. Family reporting difficulty caring for patient and patient is unable to care for himself and are requesting nursing home placement. Patient admitted to his pcp    Problems Addressed:  Acute cystitis without hematuria: acute illness or injury that poses a threat to life or bodily functions  Frequent falls: acute illness or injury that poses a threat to life or bodily functions  Laceration of forehead, initial encounter: acute illness or injury that poses a threat to life or bodily functions  Parkinson disease: chronic illness or injury with exacerbation, progression, or side effects of treatment    Amount and/or Complexity of Data Reviewed  Independent Historian: caregiver  External Data Reviewed: notes.  Labs: ordered.  Radiology: ordered.    Risk  OTC drugs.  Prescription drug management.  Decision regarding hospitalization.  Diagnosis or treatment significantly limited by social determinants of health.           Medications   LIDOcaine HCL 10  mg/ml (1%) injection 5 mL (has no administration in time range)   midodrine tablet 10 mg (has no administration in time range)   carbidopa-levodopa  mg per tablet 1 tablet (has no administration in time range)   amantadine HCL capsule/tablet 100 mg (has no administration in time range)   ALPRAZolam tablet 0.25 mg (has no administration in time range)   ciprofloxacin (CIPRO)400mg/200ml D5W IVPB 400 mg (has no administration in time range)     Medical Decision Making:   History:   I obtained history from: someone other than patient.       <> Summary of History: Family  Old Medical Records: I decided to obtain old medical records.  Old Records Summarized: records from previous admission(s).       <> Summary of Records: Dehydration  Initial Assessment:   See HPI  Clinical Tests:   Lab Tests: Ordered and Reviewed  Radiological Study: Ordered and Reviewed  Other:   I have discussed this case with another health care provider.           ED Course as of 07/19/23 1401   Tue Jul 18, 2023   0945 Laceration repaired, ct delayed by multiple traumas, cipro ordered per urine culture results per family, iv ordered as they reported difficulty with taking pills more recently. After ct plan to admit to pcp for nh placement [BS]   1214 Paged Joel Lazcano MD  [ED]   1216 Spoke with MD Neno [ED]   1220 Discussed with Dr. Lazcano.  Will admit.  Recommends consultation to PT OT, speech therapy.  Consult to Dr. Brannon joe. [RP]      ED Course User Index  [BS] Elisha Curtis MD  [ED] Adeline Huynh  [RP] Jaime Romero MD                 Clinical Impression:   Final diagnoses:  [G20] Parkinson disease (Primary)  [R29.6] Frequent falls  [S01.81XA] Laceration of forehead, initial encounter  [N30.00] Acute cystitis without hematuria               Elisha Curtis MD  07/18/23 0924       Elisha Curtis MD  07/19/23 1404

## 2023-07-19 LAB
ALBUMIN SERPL-MCNC: 3.5 G/DL (ref 3.4–4.8)
ALBUMIN/GLOB SERPL: 1.1 RATIO (ref 1.1–2)
ALP SERPL-CCNC: 224 UNIT/L (ref 40–150)
ALT SERPL-CCNC: 7 UNIT/L (ref 0–55)
AST SERPL-CCNC: 22 UNIT/L (ref 5–34)
BASOPHILS # BLD AUTO: 0.06 X10(3)/MCL
BASOPHILS NFR BLD AUTO: 0.7 %
BILIRUBIN DIRECT+TOT PNL SERPL-MCNC: 1 MG/DL
BUN SERPL-MCNC: 20.5 MG/DL (ref 8.4–25.7)
CALCIUM SERPL-MCNC: 8.7 MG/DL (ref 8.8–10)
CHLORIDE SERPL-SCNC: 109 MMOL/L (ref 98–107)
CO2 SERPL-SCNC: 25 MMOL/L (ref 23–31)
CREAT SERPL-MCNC: 0.78 MG/DL (ref 0.73–1.18)
EOSINOPHIL # BLD AUTO: 0.15 X10(3)/MCL (ref 0–0.9)
EOSINOPHIL NFR BLD AUTO: 1.8 %
ERYTHROCYTE [DISTWIDTH] IN BLOOD BY AUTOMATED COUNT: 15.2 % (ref 11.5–17)
GFR SERPLBLD CREATININE-BSD FMLA CKD-EPI: >60 MLS/MIN/1.73/M2
GLOBULIN SER-MCNC: 3.1 GM/DL (ref 2.4–3.5)
GLUCOSE SERPL-MCNC: 86 MG/DL (ref 82–115)
HCT VFR BLD AUTO: 34.5 % (ref 42–52)
HGB BLD-MCNC: 11.1 G/DL (ref 14–18)
IMM GRANULOCYTES # BLD AUTO: 0.09 X10(3)/MCL (ref 0–0.04)
IMM GRANULOCYTES NFR BLD AUTO: 1.1 %
LYMPHOCYTES # BLD AUTO: 1.33 X10(3)/MCL (ref 0.6–4.6)
LYMPHOCYTES NFR BLD AUTO: 16.2 %
MCH RBC QN AUTO: 31.4 PG (ref 27–31)
MCHC RBC AUTO-ENTMCNC: 32.2 G/DL (ref 33–36)
MCV RBC AUTO: 97.5 FL (ref 80–94)
MONOCYTES # BLD AUTO: 0.72 X10(3)/MCL (ref 0.1–1.3)
MONOCYTES NFR BLD AUTO: 8.8 %
NEUTROPHILS # BLD AUTO: 5.87 X10(3)/MCL (ref 2.1–9.2)
NEUTROPHILS NFR BLD AUTO: 71.4 %
NRBC BLD AUTO-RTO: 0 %
PLATELET # BLD AUTO: 434 X10(3)/MCL (ref 130–400)
PMV BLD AUTO: 10.3 FL (ref 7.4–10.4)
POTASSIUM SERPL-SCNC: 3.9 MMOL/L (ref 3.5–5.1)
PROT SERPL-MCNC: 6.6 GM/DL (ref 5.8–7.6)
RBC # BLD AUTO: 3.54 X10(6)/MCL (ref 4.7–6.1)
SODIUM SERPL-SCNC: 140 MMOL/L (ref 136–145)
WBC # SPEC AUTO: 8.22 X10(3)/MCL (ref 4.5–11.5)

## 2023-07-19 PROCEDURE — 92610 EVALUATE SWALLOWING FUNCTION: CPT

## 2023-07-19 PROCEDURE — 80053 COMPREHEN METABOLIC PANEL: CPT | Performed by: STUDENT IN AN ORGANIZED HEALTH CARE EDUCATION/TRAINING PROGRAM

## 2023-07-19 PROCEDURE — 25000003 PHARM REV CODE 250

## 2023-07-19 PROCEDURE — 25000003 PHARM REV CODE 250: Performed by: STUDENT IN AN ORGANIZED HEALTH CARE EDUCATION/TRAINING PROGRAM

## 2023-07-19 PROCEDURE — 11000001 HC ACUTE MED/SURG PRIVATE ROOM

## 2023-07-19 PROCEDURE — 25000003 PHARM REV CODE 250: Performed by: INTERNAL MEDICINE

## 2023-07-19 PROCEDURE — 25500020 PHARM REV CODE 255: Performed by: INTERNAL MEDICINE

## 2023-07-19 PROCEDURE — A9698 NON-RAD CONTRAST MATERIALNOC: HCPCS | Performed by: INTERNAL MEDICINE

## 2023-07-19 PROCEDURE — 85025 COMPLETE CBC W/AUTO DIFF WBC: CPT | Performed by: STUDENT IN AN ORGANIZED HEALTH CARE EDUCATION/TRAINING PROGRAM

## 2023-07-19 PROCEDURE — 97162 PT EVAL MOD COMPLEX 30 MIN: CPT

## 2023-07-19 PROCEDURE — 99233 SBSQ HOSP IP/OBS HIGH 50: CPT | Mod: ,,, | Performed by: PSYCHIATRY & NEUROLOGY

## 2023-07-19 PROCEDURE — 99233 PR SUBSEQUENT HOSPITAL CARE,LEVL III: ICD-10-PCS | Mod: ,,, | Performed by: PSYCHIATRY & NEUROLOGY

## 2023-07-19 PROCEDURE — 21400001 HC TELEMETRY ROOM

## 2023-07-19 PROCEDURE — 97166 OT EVAL MOD COMPLEX 45 MIN: CPT

## 2023-07-19 PROCEDURE — 92611 MOTION FLUOROSCOPY/SWALLOW: CPT

## 2023-07-19 PROCEDURE — 25000003 PHARM REV CODE 250: Performed by: EMERGENCY MEDICINE

## 2023-07-19 RX ORDER — SODIUM CHLORIDE 9 MG/ML
INJECTION, SOLUTION INTRAVENOUS CONTINUOUS
Status: ACTIVE | OUTPATIENT
Start: 2023-07-19 | End: 2023-07-19

## 2023-07-19 RX ORDER — LATANOPROST 50 UG/ML
1 SOLUTION/ DROPS OPHTHALMIC DAILY
Status: DISCONTINUED | OUTPATIENT
Start: 2023-07-19 | End: 2023-07-24 | Stop reason: HOSPADM

## 2023-07-19 RX ORDER — BRIMONIDINE TARTRATE 1.5 MG/ML
1 SOLUTION/ DROPS OPHTHALMIC 3 TIMES DAILY
Status: DISCONTINUED | OUTPATIENT
Start: 2023-07-19 | End: 2023-07-24 | Stop reason: HOSPADM

## 2023-07-19 RX ORDER — CIPROFLOXACIN 500 MG/1
500 TABLET ORAL EVERY 12 HOURS
Status: DISCONTINUED | OUTPATIENT
Start: 2023-07-19 | End: 2023-07-24 | Stop reason: HOSPADM

## 2023-07-19 RX ADMIN — MIDODRINE HYDROCHLORIDE 10 MG: 5 TABLET ORAL at 09:07

## 2023-07-19 RX ADMIN — ACETAMINOPHEN 650 MG: 325 TABLET, FILM COATED ORAL at 05:07

## 2023-07-19 RX ADMIN — CARBIDOPA AND LEVODOPA 1 TABLET: 25; 100 TABLET ORAL at 09:07

## 2023-07-19 RX ADMIN — CIPROFLOXACIN HYDROCHLORIDE 500 MG: 500 TABLET, FILM COATED ORAL at 12:07

## 2023-07-19 RX ADMIN — LATANOPROST 1 DROP: 50 SOLUTION OPHTHALMIC at 12:07

## 2023-07-19 RX ADMIN — CARBIDOPA AND LEVODOPA 1 TABLET: 25; 100 TABLET ORAL at 12:07

## 2023-07-19 RX ADMIN — BARIUM SULFATE 10 ML: 0.81 POWDER, FOR SUSPENSION ORAL at 10:07

## 2023-07-19 RX ADMIN — MIDODRINE HYDROCHLORIDE 10 MG: 5 TABLET ORAL at 05:07

## 2023-07-19 RX ADMIN — ALPRAZOLAM 0.25 MG: 0.25 TABLET ORAL at 12:07

## 2023-07-19 RX ADMIN — AMANTADINE HYDROCHLORIDE 100 MG: 100 CAPSULE, LIQUID FILLED ORAL at 09:07

## 2023-07-19 RX ADMIN — BRIMONIDINE TARTRATE 1 DROP: 1.5 SOLUTION OPHTHALMIC at 09:07

## 2023-07-19 RX ADMIN — SODIUM CHLORIDE: 9 INJECTION, SOLUTION INTRAVENOUS at 12:07

## 2023-07-19 RX ADMIN — AMANTADINE HYDROCHLORIDE 100 MG: 100 CAPSULE, LIQUID FILLED ORAL at 08:07

## 2023-07-19 RX ADMIN — CARBIDOPA AND LEVODOPA 1 TABLET: 25; 100 TABLET ORAL at 08:07

## 2023-07-19 RX ADMIN — CIPROFLOXACIN HYDROCHLORIDE 500 MG: 500 TABLET, FILM COATED ORAL at 08:07

## 2023-07-19 RX ADMIN — CARBIDOPA AND LEVODOPA 1 TABLET: 25; 100 TABLET ORAL at 05:07

## 2023-07-19 RX ADMIN — BRIMONIDINE TARTRATE 1 DROP: 1.5 SOLUTION OPHTHALMIC at 04:07

## 2023-07-19 NOTE — ASSESSMENT & PLAN NOTE
With recurring falls    -home sinemet resumed, no need to increase dose at this time  -antimicrobial management for UTI per primary team  -fall precautions  -Further recommendations per MD

## 2023-07-19 NOTE — PLAN OF CARE
07/19/23 1131   Discharge Assessment   Assessment Type Discharge Planning Assessment   Confirmed/corrected address, phone number and insurance Yes   Confirmed Demographics Correct on Facesheet   Source of Information family   Communicated DENNIS with patient/caregiver Date not available/Unable to determine   Reason For Admission Parkinsons   People in Home alone   Do you expect to return to your current living situation? Other (see comments)  (rehab)   Do you have help at home or someone to help you manage your care at home? No   Readmission within 30 days? No   Patient currently being followed by outpatient case management? No   Do you currently have service(s) that help you manage your care at home? Yes   Name and Contact number of agency [facility]   Is the pt/caregiver preference to resume services with current agency Yes   Do you take prescription medications? Yes   Do you have any problems affording any of your prescribed medications? No   Is the patient taking medications as prescribed? yes   How do you get to doctors appointments? family or friend will provide   Are you on dialysis? No   Do you take coumadin? No   Discharge Plan A Rehab   Discharge Plan B Skilled Nursing Facility   DME Needed Upon Discharge  none   Discharge Plan discussed with: Sibling   Transition of Care Barriers None

## 2023-07-19 NOTE — PLAN OF CARE
Problem: Occupational Therapy  Goal: Occupational Therapy Goal  Description: Goals to be met by: 8/2/23     Patient will increase functional independence with ADLs by performing:    UE Dressing with CGA.  LE Dressing with CGA.  Grooming while standing at sink with Modified [place].  Toileting from toilet with Stand-by Assistance for hygiene and clothing management.   Toilet transfer to toilet with Stand-by Assistance.    Outcome: Ongoing, Progressing

## 2023-07-19 NOTE — PT/OT/SLP EVAL
Physical Therapy Evaluation    Patient Name:  Cristopher Farooq   MRN:  [id number]    Recommendations:     Discharge Recommendations: nursing facility, skilled   Discharge Equipment Recommendations: none   Barriers to discharge: Impaired mobility    Assessment:     Cristopher Farooq is a 67 y.o. male admitted with a medical diagnosis of fall out of bed x2, parkinson's disease, UTI, laceration to forehead.  He presents with the following impairments/functional limitations: weakness, impaired endurance, impaired functional mobility, impaired self care skills, gait instability, impaired balance, impaired cognition, decreased coordination. Pt's niece reports pt increasing falls in the past several weeks. Normally, pt can ambulate with RW, dress, and feed himself. He does need A with showers. Recommending SNF placement for further therapies to decrease caregiver burden and lessen fall risk. Pt demonstrates poor balance and weakness.     Rehab Prognosis: Fair; patient would benefit from acute skilled PT services to address these deficits and reach maximum level of function.    Recent Surgery: * No surgery found *      Plan:     During this hospitalization, patient to be seen 6 x/week to address the identified rehab impairments via gait training, therapeutic activities, therapeutic exercises, neuromuscular re-education and progress toward the following goals:    Plan of Care Expires:  08/19/23    Subjective     Chief Complaint: falls  Patient/Family Comments/goals: to get stronger  Pain/Comfort:  Pain Rating 1: 0/10    Patients cultural, spiritual, Congregational conflicts given the current situation: no    Living Environment:  One story home with no steps to enter. Lives with niece who works from home.   Prior to admission, patients level of function was ambulatory with RW, needs assistance with showers..  Equipment used at home: walker, rolling, other (see comments), bedside commode (transport chair).    Upon discharge, patient will have  assistance from family.    Objective:     Communicated with nurse prior to session.  Patient found supine with peripheral IV, pulse ox (continuous), telemetry, restraints  upon PT entry to room.    General Precautions: Standard, fall  Orthopedic Precautions:    Braces: N/A  Respiratory Status: Room air      Exams:  Sensation:    -       Intact  RLE ROM: WNL  RLE Strength: Deficits: grossly 4+/5   LLE ROM: WNL  LLE Strength: Deficits: grossly 4+/5  Skin integrity: Visible skin intact      Functional Mobility:  Bed Mobility:     Supine to Sit: contact guard assistance  Sit to Supine: contact guard assistance  Transfers:     Sit to Stand:  minimum assistance with rolling walker  Gait: 80ft with min-mod A with RW, pt easily distractible and needs VC to keep hands on RW. Pt had one major LOB with episodic steppage gait.       AM-PAC 6 CLICK MOBILITY  Total Score:16       Treatment & Education:      Patient and family provided with verbal education regarding PT POC.  Understanding was verbalized.     Patient left supine with all lines intact, call button in reach, bed alarm on, restraints reapplied at end of session, and CM notified.    GOALS:   Multidisciplinary Problems       Physical Therapy Goals          Problem: Physical Therapy    Goal Priority Disciplines Outcome Goal Variances Interventions   Physical Therapy Goal     PT, PT/OT Ongoing, Progressing     Description: Goals to be met by: 2023     Patient will increase functional independence with mobility by performin. Supine to sit with Modified [place]  2. Sit to stand transfer with Stand-by Assistance  3. Gait  x 200 feet with Stand-by Assistance using Rolling Walker.                          History:     Past Medical History:   Diagnosis Date    Parkinson's disease     Rhabdomyolysis     Unspecified glaucoma        Past Surgical History:   Procedure Laterality Date    ABDOMINAL SURGERY      CHOLECYSTECTOMY      FOOT SURGERY         Time  Tracking:     PT Received On: 07/19/23  PT Start Time: 0913     PT Stop Time: 0936  PT Total Time (min): 23 min     Billable Minutes: Evaluation 23 07/19/2023

## 2023-07-19 NOTE — PLAN OF CARE
Spoke to patient's sister she is trying to navigate the medicaid process and understand that he will have to pay penalties to get Part B and is looking into it. Will ask financial counselor to assist with application

## 2023-07-19 NOTE — SUBJECTIVE & OBJECTIVE
Subjective:     Interval History:   Neurologic exam unchanged. No issues overnight or this AM per pt's niece at bedside.     Current Neurological Medications:     Current Facility-Administered Medications   Medication Dose Route Frequency Provider Last Rate Last Admin    acetaminophen tablet 650 mg  650 mg Oral Q8H PRN Jaime Romero MD        ALPRAZolam tablet 0.25 mg  0.25 mg Oral BID PRN Elisha Curtis MD   0.25 mg at 07/19/23 0036    amantadine HCL capsule/tablet 100 mg  100 mg Oral BID Elisha Curtis MD   100 mg at 07/19/23 0923    carbidopa-levodopa  mg per tablet 1 tablet  1 tablet Oral QID ROLAN Yanez   1 tablet at 07/19/23 0922    melatonin tablet 6 mg  6 mg Oral Nightly PRN Jaime Romero MD   6 mg at 07/18/23 2120    midodrine tablet 10 mg  10 mg Oral BID WM Elisha Curtis MD   10 mg at 07/19/23 0922    ondansetron injection 4 mg  4 mg Intravenous Q8H PRN Jaime Romero MD        sodium chloride 0.9% flush 10 mL  10 mL Intravenous PRN Jaime Romero MD           Review of Systems  Unable to perform ROS: Dementia  Objective:     Vital Signs (Most Recent):  Temp: 98.2 °F (36.8 °C) (07/19/23 0717)  Pulse: 67 (07/19/23 0717)  Resp: 18 (07/19/23 0717)  BP: 113/71 (07/19/23 0717)  SpO2: 100 % (07/19/23 0717) Vital Signs (24h Range):  Temp:  [98 °F (36.7 °C)-98.2 °F (36.8 °C)] 98.2 °F (36.8 °C)  Pulse:  [63-75] 67  Resp:  [17-19] 18  SpO2:  [99 %-100 %] 100 %  BP: (102-114)/(60-74) 113/71     Weight: 77.1 kg (170 lb)  Body mass index is 28.29 kg/m².     Physical Exam   Vitals reviewed.   Constitutional:       General: He is awake.      Comments: Hypomimia noted   HENT:      Head: Normocephalic.      Comments: R frontal head laceration with sutures intact        Nose: Nose normal.      Mouth/Throat:      Mouth: Mucous membranes are moist.      Pharynx: Oropharynx is clear.   Eyes:      Extraocular Movements: Extraocular movements intact and EOM normal.      Pupils: Pupils are equal,  round, and reactive to light.   Psychiatric:         Speech: Speech normal.         Behavior: Behavior is cooperative.      NEUROLOGICAL EXAMINATION:      MENTAL STATUS   Oriented to person.   Oriented to place.   Follows 1 step commands.   Attention: decreased. Concentration: decreased.   Speech: speech is normal   Level of consciousness: alert  Knowledge: poor.        Disoriented to elements of situation      CRANIAL NERVES      CN II   Visual acuity: decreased     CN III, IV, VI   Pupils are equal, round, and reactive to light.  Extraocular motions are normal.   Nystagmus: none   Conjugate gaze: present     CN V   Facial sensation intact.      CN VII   Facial expression full, symmetric.      MOTOR EXAM   Overall muscle tone: increased  Right arm tone: cogwheel rigidity  Left arm tone: cogwheel rigidity       Moves all extremities purposefully and antigravity      SENSORY EXAM   Light touch normal.      GAIT AND COORDINATION      Tremor   Resting tremor: absent          Significant Labs:   Recent Lab Results         07/19/23  0556   07/18/23  1602        Albumin/Globulin Ratio 1.1         Albumin 3.5         Alkaline Phosphatase 224         ALT 7         Appearance, UA   Turbid       AST 22         Bacteria, UA   Many       Baso # 0.06         Basophil % 0.7         BILIRUBIN TOTAL 1.0         Bilirubin, UA   1+       BUN 20.5         Ca Oxalate Kiki, UA   Occasional       Calcium 8.7         Chloride 109         CO2 25         Color, UA   Dark Yellow       Creatinine 0.78         eGFR >60         Eos # 0.15         Eosinophil % 1.8         Globulin, Total 3.1         Glucose 86         Glucose, UA   Negative       Hematocrit 34.5         Hemoglobin 11.1         Immature Grans (Abs) 0.09         Immature Granulocytes 1.1         Ketones, UA   Trace       Leukocytes, UA   2+       Lymph # 1.33         LYMPH % 16.2         MCH 31.4         MCHC 32.2         MCV 97.5         Mono # 0.72         Mono % 8.8         MPV  10.3         Neut # 5.87         Neut % 71.4         NITRITE UA   Negative       nRBC 0.0         Occult Blood UA   Negative       pH, UA   6.0       Platelets 434         Potassium 3.9         PROTEIN TOTAL 6.6         Protein, UA   1+       RBC 3.54         RBC, UA   0-2       RDW 15.2         Sodium 140         Specific Gravity,UA   >=1.040       Squam Epithel, UA   0-4       Urine Culture, Routine   No Growth At 24 Hours  [P]       Urobilinogen, UA   1.0       WBC, UA   >100       WBC 8.22                  [P] - Preliminary Result               Significant Imaging: I have reviewed all pertinent imaging results/findings within the past 24 hours.

## 2023-07-19 NOTE — PT/OT/SLP EVAL
Speech Language Pathology Department  Clinical Swallow Evaluation    Patient Name:  Cristopher Farooq   MRN:  [id number]    Recommendations:     General recommendations:  Modified Barium Swallow Study  Diet recommendations:  NPO, Liquid Diet Level: NPO   Precautions: Standard,      History:     Cristopher Farooq is a/n 67 y.o. male admitted following a fall while getting out of bed.    Past Medical History:   Diagnosis Date    Parkinson's disease     Rhabdomyolysis     Unspecified glaucoma      Past Surgical History:   Procedure Laterality Date    ABDOMINAL SURGERY      CHOLECYSTECTOMY      FOOT SURGERY       Home Diet: Regular and thin liquids  Current Method of Nutrition: NPO    Imaging   Results for orders placed during the hospital encounter of 03/10/23    X-Ray Chest 1 View    Narrative  EXAMINATION:  XR CHEST 1 VIEW    CLINICAL HISTORY:  Injury, unspecified, initial encounter    TECHNIQUE:  AP chest    COMPARISON:  None.    FINDINGS:  The heart is normal in size.  There is no focal airspace consolidation.  There is no pleural effusion or definite visible pneumothorax.    Impression  No acute abnormality of the chest.      Electronically signed by: Agatha Buenrostro  Date:    03/10/2023  Time:    13:16    No results found for this or any previous visit.    Results for orders placed during the hospital encounter of 03/10/23    MRI Brain Without Contrast  Narrative  EXAMINATION:  MRI BRAIN WITHOUT CONTRAST    CLINICAL HISTORY:  Stroke, follow up;    TECHNIQUE:  Multiplanar multisequence MR imaging of the brain was performed without contrast.    COMPARISON:  None    FINDINGS:  No intracranial mass or lesion is seen.  No hemorrhage is seen.  No acute infarct is seen.  No diffusion abnormality seen.  There is diffuse cerebral atrophy seen along with some compensatory ventricular dilatation and periventricular white matter change consistent with patient's age.  Posterior fossa appears normal.  Calvarium is intact.  Paranasal sinuses  appear grossly unremarkable.    Impression  Chronic age related changes  Otherwise unremarkable  Electronically signed by: Nona Fernández  Date:    03/10/2023  Time:    15:41    Subjective     Patient awake and alert.  Spiritual/Cultural/Restorationist Beliefs/Practices that affect care:  no  Pain/Comfort:  0/10    Objective:     ORAL MUSCULATURE  Dentition: own teeth  Secretion Management: adequate  Mucosal Quality: good  Facial Movement: WFL  Buccal Strength & Mobility: WFL  Mandibular Strength & Mobility: WFL    Consistency Fed By Oral Symptoms Pharyngeal Symptoms   Thin liquid by straw SLP None Multiple swallows  Wet vocal quality after swallow  Throat clear after swallow   Puree SLP None Multiple swallows     Assessment:     Signs/sx of aspiration. REC: MBS to further evaluate swallow function.   Patient Education:     Patient provided with verbal education regarding POC.  Understanding was verbalized.     Time Tracking:     SLP Treatment Date:    7/19/23  Speech Start Time:   0945  Speech Stop Time:    1000    Speech Total Time (min):   15    Billable minutes:  Swallow and Oral Function Evaluation, 15 minutes     07/19/2023

## 2023-07-19 NOTE — PLAN OF CARE
Problem: Adult Inpatient Plan of Care  Goal: Absence of Hospital-Acquired Illness or Injury  Outcome: Ongoing, Progressing     Problem: Adult Inpatient Plan of Care  Goal: Optimal Comfort and Wellbeing  Outcome: Ongoing, Progressing     Problem: Adult Inpatient Plan of Care  Goal: Readiness for Transition of Care  Outcome: Ongoing, Progressing     Problem: Impaired Wound Healing  Goal: Optimal Wound Healing  Outcome: Ongoing, Progressing     Problem: Skin Injury Risk Increased  Goal: Skin Health and Integrity  Outcome: Ongoing, Progressing

## 2023-07-19 NOTE — PLAN OF CARE
Problem: Physical Therapy  Goal: Physical Therapy Goal  Description: Goals to be met by: 2023     Patient will increase functional independence with mobility by performin. Supine to sit with Modified [place]  2. Sit to stand transfer with Stand-by Assistance  3. Gait  x 200 feet with Stand-by Assistance using Rolling Walker.     Outcome: Ongoing, Progressing

## 2023-07-19 NOTE — PROGRESS NOTES
[facility] - 5th Floor Med Surg  Neurology  Progress Note    Patient Name: Cristopher Farooq  MRN: [id number]  Admission Date: 7/18/2023  Hospital Length of Stay: 1 days  Code Status: Full Code   Attending Provider: Joel Castillo,*  Primary Care Physician: Joel Castillo MD   Principal Problem:<principal problem not specified>    HPI:   67-year-old male with PMH Parkinson's disease glaucoma with vision loss who presented to ED on 07/18 following a fall while getting out of bed this morning and subsequently strike his head on the floor.  Patient did not lose consciousness.  Patient reportedly having worsening Parkinson's symptoms, including increased falls over the last week.  Of note, patient recently noted to have UTI o/p and was started on ciprofloxacin by his PCP.    CT head without negative for acute intracranial abnormalities.  Labs unremarkable.    Patient's neurologist Dr. South. Recent o/p visit where Sinemet 25/100 mg 1 tab was increased from TID to QID, which has been resumed.      Overview/Hospital Course:  No notes on file        Subjective:     Interval History:   Neurologic exam unchanged. No issues overnight or this AM per pt's niece at bedside.     Current Neurological Medications:     Current Facility-Administered Medications   Medication Dose Route Frequency Provider Last Rate Last Admin    acetaminophen tablet 650 mg  650 mg Oral Q8H PRN Jaime Romero MD        ALPRAZolam tablet 0.25 mg  0.25 mg Oral BID PRN Elisha Curtis MD   0.25 mg at 07/19/23 0036    amantadine HCL capsule/tablet 100 mg  100 mg Oral BID Elisha Curtis MD   100 mg at 07/19/23 0923    carbidopa-levodopa  mg per tablet 1 tablet  1 tablet Oral QID ROLAN Yanez   1 tablet at 07/19/23 0922    melatonin tablet 6 mg  6 mg Oral Nightly PRN Jaime Romero MD   6 mg at 07/18/23 2120    midodrine tablet 10 mg  10 mg Oral BID WM Elisha Curtis MD   10 mg at 07/19/23 0922     ondansetron injection 4 mg  4 mg Intravenous Q8H PRN Jaime Romero MD        sodium chloride 0.9% flush 10 mL  10 mL Intravenous PRN Jaime Romero MD           Review of Systems  Unable to perform ROS: Dementia  Objective:     Vital Signs (Most Recent):  Temp: 98.2 °F (36.8 °C) (07/19/23 0717)  Pulse: 67 (07/19/23 0717)  Resp: 18 (07/19/23 0717)  BP: 113/71 (07/19/23 0717)  SpO2: 100 % (07/19/23 0717) Vital Signs (24h Range):  Temp:  [98 °F (36.7 °C)-98.2 °F (36.8 °C)] 98.2 °F (36.8 °C)  Pulse:  [63-75] 67  Resp:  [17-19] 18  SpO2:  [99 %-100 %] 100 %  BP: (102-114)/(60-74) 113/71     Weight: 77.1 kg (170 lb)  Body mass index is 28.29 kg/m².     Physical Exam   Vitals reviewed.   Constitutional:       General: He is awake.      Comments: Hypomimia noted   HENT:      Head: Normocephalic.      Comments: R frontal head laceration with sutures intact        Nose: Nose normal.      Mouth/Throat:      Mouth: Mucous membranes are moist.      Pharynx: Oropharynx is clear.   Eyes:      Extraocular Movements: Extraocular movements intact and EOM normal.      Pupils: Pupils are equal, round, and reactive to light.   Psychiatric:         Speech: Speech normal.         Behavior: Behavior is cooperative.      NEUROLOGICAL EXAMINATION:      MENTAL STATUS   Oriented to person.   Oriented to place.   Follows 1 step commands.   Attention: decreased. Concentration: decreased.   Speech: speech is normal   Level of consciousness: alert  Knowledge: poor.        Disoriented to elements of situation      CRANIAL NERVES      CN II   Visual acuity: decreased     CN III, IV, VI   Pupils are equal, round, and reactive to light.  Extraocular motions are normal.   Nystagmus: none   Conjugate gaze: present     CN V   Facial sensation intact.      CN VII   Facial expression full, symmetric.      MOTOR EXAM   Overall muscle tone: increased  Right arm tone: cogwheel rigidity  Left arm tone: cogwheel rigidity       Moves all extremities  purposefully and antigravity      SENSORY EXAM   Light touch normal.      GAIT AND COORDINATION      Tremor   Resting tremor: absent          Significant Labs:   Recent Lab Results         07/19/23  0556   07/18/23  1602        Albumin/Globulin Ratio 1.1         Albumin 3.5         Alkaline Phosphatase 224         ALT 7         Appearance, UA   Turbid       AST 22         Bacteria, UA   Many       Baso # 0.06         Basophil % 0.7         BILIRUBIN TOTAL 1.0         Bilirubin, UA   1+       BUN 20.5         Ca Oxalate Kiki, UA   Occasional       Calcium 8.7         Chloride 109         CO2 25         Color, UA   Dark Yellow       Creatinine 0.78         eGFR >60         Eos # 0.15         Eosinophil % 1.8         Globulin, Total 3.1         Glucose 86         Glucose, UA   Negative       Hematocrit 34.5         Hemoglobin 11.1         Immature Grans (Abs) 0.09         Immature Granulocytes 1.1         Ketones, UA   Trace       Leukocytes, UA   2+       Lymph # 1.33         LYMPH % 16.2         MCH 31.4         MCHC 32.2         MCV 97.5         Mono # 0.72         Mono % 8.8         MPV 10.3         Neut # 5.87         Neut % 71.4         NITRITE UA   Negative       nRBC 0.0         Occult Blood UA   Negative       pH, UA   6.0       Platelets 434         Potassium 3.9         PROTEIN TOTAL 6.6         Protein, UA   1+       RBC 3.54         RBC, UA   0-2       RDW 15.2         Sodium 140         Specific Gravity,UA   >=1.040       Squam Epithel, UA   0-4       Urine Culture, Routine   No Growth At 24 Hours  [P]       Urobilinogen, UA   1.0       WBC, UA   >100       WBC 8.22                  [P] - Preliminary Result               Significant Imaging: I have reviewed all pertinent imaging results/findings within the past 24 hours.    Assessment and Plan:     Parkinson's disease  With recurring falls    -home sinemet resumed, no need to increase dose at this time  -antimicrobial management for UTI per primary  team  -fall precautions  -Further recommendations per MD            VTE Risk Mitigation (From admission, onward)         Ordered     Place DEBRA hose  Until discontinued         07/18/23 1220     IP VTE LOW RISK PATIENT  Once         07/18/23 1220                JABARI Yanez-BC  Inpatient Neurology  [facility] - 5th Floor Med Surg

## 2023-07-19 NOTE — PLAN OF CARE
LTG: To tolerate least restrictive diet without clinical signs/sx of aspiration.   ST. Tongue base/laryngeal excursion exercise   2. Tolerate thermal stimulation x10 with 100% swallow response with less than a 2 second delay.   3. Puree trials without clinical signs/sx of aspiration.

## 2023-07-19 NOTE — NURSING
Nurses Note -- 4 Eyes      7/18/2023   7:16 PM      Skin assessed during: Admit      [] No Altered Skin Integrity Present    []Prevention Measures Documented      [x] Yes- Altered Skin Integrity Present or Discovered   [] LDA Added if Not in Epic (Describe Wound)   [] New Altered Skin Integrity was Present on Admit and Documented in LDA   [] Wound Image Taken    Wound Care Consulted? Yes    Attending Nurse:  WOODY LAMAS RN     Second RN/Staff Member: ANASTASIA WHARTON RN

## 2023-07-19 NOTE — H&P
[facility] - 5th Floor [facility] Surg  [facility] Medicine  History & Physical    Patient Name: Cristopher Farooq  MRN: [id number]  Patient Class: IP- Inpatient  Admission Date: 7/18/2023  Attending Physician: Joel Castillo,*   Primary Care Provider: Joel Castillo MD         Patient information was obtained from ER records.     Subjective: Patient brought by family to ED with severe declining in the past week. He have Hx of Parkinson disease recently medication adjusted by Dr South.  He continue to decline to the point he was no mobile, family decided to brimng him to ED. ED MD evaluated and found volume depleted and with (=) UTI. I was called for admission family thought he was too much to take care of him at home, will admit and start fluids continue Cipro 500 mg PO BID for UTI ( was recommended at the office ), consult Neuro and PT/OT/SLT for evaluation recommendations and possible [facility] consultantions.     Principal Problem: Severe declining and weakness/ Parkinson    Chief Complaint: Steady declining in the past week per family  Chief Complaint   Patient presents with    Fall     Roll out of bed this morning, hit head on floor. 6 cm lac to r side of head. -loc. -thinners. Denies pain on arrival. C-collar placed per precaution        HPI: As above    Patient Active Problem List   Diagnosis    Rhabdomyolysis    Bradykinesia    Parkinson's disease        Past Medical History:   Diagnosis Date    Parkinson's disease     Rhabdomyolysis     Unspecified glaucoma         Past Surgical History:   Procedure Laterality Date    ABDOMINAL SURGERY      CHOLECYSTECTOMY      FOOT SURGERY          No current facility-administered medications on file prior to encounter.     Current Outpatient Medications on File Prior to Encounter   Medication Sig Dispense Refill    ALPRAZolam (XANAX) 0.25 MG tablet Take 1 tablet (0.25 mg total) by mouth 2 (two) times daily as needed for Anxiety. 10 tablet 0    amantadine HCL  (SYMMETREL) 100 mg capsule Take 100 mg by mouth 2 (two) times daily.      brimonidine 0.1% (ALPHAGAN P) 0.1 % Drop Place 1 drop into both eyes 3 (three) times daily.      carbidopa-levodopa  mg (SINEMET)  mg per tablet Take 1 tablet by mouth 2 (two) times daily.      latanoprostene bunod (VYZULTA) 0.024 % Drop Apply to eye.      midodrine (PROAMATINE) 10 MG tablet Take 1 tablet (10 mg total) by mouth 2 (two) times daily with meals. 60 tablet 0        Review of patient's allergies indicates:   Allergen Reactions    Fish containing products     Shellfish containing products     Tramadol         Social History     Socioeconomic History    Marital status: Single   Tobacco Use    Smoking status: Unknown     Social Determinants of Health     Social Connections: Unknown    Marital Status: Never         Family History   Family history unknown: Yes        Review of Systems   Musculoskeletal:  Positive for falls.   Neurological:  Positive for weakness.   All other systems reviewed and are negative.       Current Facility-Administered Medications:     acetaminophen tablet 650 mg, 650 mg, Oral, Q8H PRN, Jaime Romero MD    ALPRAZolam tablet 0.25 mg, 0.25 mg, Oral, BID PRN, Elisha Curtis MD, 0.25 mg at 07/19/23 0036    amantadine HCL capsule/tablet 100 mg, 100 mg, Oral, BID, Elisha Curtis MD, 100 mg at 07/19/23 0923    carbidopa-levodopa  mg per tablet 1 tablet, 1 tablet, Oral, QID, Michelle Domingo, TETEP, 1 tablet at 07/19/23 0922    melatonin tablet 6 mg, 6 mg, Oral, Nightly PRN, Jaime Romero MD, 6 mg at 07/18/23 2120    midodrine tablet 10 mg, 10 mg, Oral, BID WM, Elisha Curtis MD, 10 mg at 07/19/23 0922    ondansetron injection 4 mg, 4 mg, Intravenous, Q8H PRN, Jaime Romero MD    sodium chloride 0.9% flush 10 mL, 10 mL, Intravenous, PRN, Jaime Romero MD     Objective      Vitals:    07/18/23 2356 07/19/23 0014 07/19/23 0510 07/19/23 0717   BP: 111/66 111/66 110/68 113/71   Pulse:  "72 72 63 67   Resp: 19 19 19 18   Temp: 98 °F (36.7 °C) 98 °F (36.7 °C) 98.2 °F (36.8 °C) 98.2 °F (36.8 °C)   TempSrc: Oral  Axillary Oral   SpO2: 99% 99% 100% 100%   Weight:  77.1 kg (170 lb)     Height:  5' 5" (1.651 m)            Intake/Output Summary (Last 24 hours) at 7/19/2023 1012  Last data filed at 7/19/2023 0510  Gross per 24 hour   Intake --   Output 1 ml   Net -1 ml           Recent Labs   Lab 07/18/23  0844 07/19/23  0556    140   K 3.9 3.9   CO2 24 25   BUN 25.4 20.5   CREATININE 0.81 0.78   CALCIUM 8.7* 8.7*   BILITOT 1.0 1.0   ALKPHOS 205* 224*   ALT 5 7   AST 19 22   GLUCOSE 97 86     Recent Labs   Lab 07/19/23  0556   WBC 8.22   HGB 11.1*   HCT 34.5*   *    Urine dipstick shows positive for RBC's.  Micro exam: >100 WBC's per HPF, many bacteria, and Calcium crystals seen.     Imaging Results              CT Cervical Spine Without Contrast (Final result)  Result time 07/18/23 10:52:44      Final result by Agatha Buenrostro MD (07/18/23 10:52:44)                   Impression:      No acute fracture identified.      Electronically signed by: Agatha Buenrostro  Date:    07/18/2023  Time:    10:52               Narrative:    EXAMINATION:  CT CERVICAL SPINE WITHOUT CONTRAST    CLINICAL HISTORY:  Neck trauma (Age >= 65y);    TECHNIQUE:  Noncontrast CT images of the cervical spine. Axial, coronal, and sagittal reformatted images were obtained. Dose length product is 299 mGycm. Automatic exposure control, adjustment of mA/kV or iterative reconstruction technique was used to limit radiation dose.    COMPARISON:  MRI cervical spine dated 03/13/2023    FINDINGS:  The cervical spine is visualized through the level of T1.    There is no acute fracture identified.  There is a healing fracture of the right posterior 1st rib.  There is straightening of normal cervical lordosis.  There are mild degenerative changes with disc height loss, marginal osteophyte formation and facet arthropathy.  There is no " paraspinal hematoma.                                       CT Head Without Contrast (Final result)  Result time 07/18/23 10:49:47      Final result by Valentín Guerrero MD (07/18/23 10:49:47)                   Impression:      No acute intracranial abnormality    Chronic changes as above    Right frontal scalp injury and swelling.      Electronically signed by: Valentín Guerrero MD  Date:    07/18/2023  Time:    10:49               Narrative:    EXAMINATION:  CT of the head without contrast    CLINICAL HISTORY:  Head trauma, injury    TECHNIQUE:  Routine CT of the head was performed without contrast    Total DLP: 968 mGy.cm    Automatic exposure control was utilized to reduce the patient's dose    COMPARISON:  05/30/2023    FINDINGS:  There is no acute intracranial hemorrhage, midline shift, mass-effect, or extra-axial collection.  The ventricles and sulci are globally proportional prominent compatible with moderate involutional changes.  There are mild patchy areas of decreased attenuation in the periventricular subcortical white matter, while nonspecific, most commonly sequela of chronic microvascular ischemia.  No sulcal effacement.  Gray-white matter differentiation is preserved.  There is right frontal scalp soft tissue injury and swelling evident.  The visualized osseous structures are intact.  The visualized paranasal sinuses and mastoid air cells are clear.                                       Physical Exam  Vitals and nursing note reviewed. Exam conducted with a chaperone present.   Constitutional:       Appearance: Normal appearance.   HENT:      Head: Normocephalic and atraumatic.      Right Ear: Tympanic membrane, ear canal and external ear normal.      Left Ear: Tympanic membrane, ear canal and external ear normal.      Nose: Nose normal.      Mouth/Throat:      Mouth: Mucous membranes are moist.   Eyes:      Extraocular Movements: Extraocular movements intact.      Pupils: Pupils are equal, round, and  reactive to light.   Cardiovascular:      Rate and Rhythm: Normal rate and regular rhythm.      Pulses: Normal pulses.      Heart sounds: Normal heart sounds.   Pulmonary:      Effort: Pulmonary effort is normal.      Breath sounds: Normal breath sounds.   Abdominal:      General: Abdomen is flat.      Palpations: Abdomen is soft.   Musculoskeletal:         General: Normal range of motion.      Cervical back: Normal range of motion and neck supple.   Skin:     General: Skin is warm and dry.   Neurological:      General: No focal deficit present.      Mental Status: He is alert and oriented to person, place, and time.   Psychiatric:         Mood and Affect: Mood normal.         Behavior: Behavior normal.        Assessment/Plan:  1-UTI  2-Volume depletion  3-Parkinson exacerbation  4-Steady declining last few weeks  Will start fluids NS @ 100 cc/hr times 1 bag, start Cipro 500 mg PO BID for 7 days and consult [facility] for evaluation.     No notes have been filed under this hospital service.  Service: Hospital Medicine    VTE Risk Mitigation (From admission, onward)           Ordered     Place DEBRA hose  Until discontinued         07/18/23 1220     IP VTE LOW RISK PATIENT  Once         07/18/23 1220                       Joel Castillo MD  Department of Hospital Medicine   [facility] - 5th Floor Med Surg

## 2023-07-19 NOTE — PT/OT/SLP EVAL
Occupational Therapy  Evaluation    Name: Cristopher Farooq  MRN: [id number]  Admitting Diagnosis: Fall: head lac, acute cystitis w/o hematuria, parkinsons dx  Recent Surgery: * No surgery found *      Recommendations:     Discharge Recommendations: nursing facility, skilled  Discharge Equipment Recommendations:  none  Barriers to discharge:  Inaccessible home environment    Assessment:     Cristopher Farooq is a 67 y.o. male with a medical diagnosis of Fall: head lac, acute cystitis w/o hematuria, parkinsons dx. Performance deficits affecting function: weakness, impaired functional mobility, impaired coordination, impaired self care skills.  Pt agreed to participate in therapy today. Pt's niece present at bedside. Pertinent information gathered from niece (Shaina Greene) d/t pt's AMS. Pt would benefit from SNF placement upon d/c.    Rehab Prognosis: Good; patient would benefit from acute skilled OT services to address these deficits and reach maximum level of function.       Plan:     Patient to be seen 3-5 x/week to address the above listed problems via self-care/home management, therapeutic activities, therapeutic exercises  Plan of Care Expires: 08/02/23  Plan of Care Reviewed with: patient, family    Subjective       Occupational Profile: info gathered from niece at bedside d/t pt's AMS  Living Environment: Lives c niece in [facility]. Has walk in shower at home, but it is too far across the house.   Previous level of function: pt was able to dress independently (on occasion). Pt received help for all other ADLs and received sponge bath. Pt uses RW for mobility   Equipment Used at Home: walker, rolling, bedside commode    Pain/Comfort:  Pain Rating 1: 0/10    Patients cultural, spiritual, Evangelical conflicts given the current situation: no    Objective:     Communicated with: RN prior to session.  Patient found supine with blood pressure cuff, Other (comments) (Roll belt) upon OT entry to room.    General Precautions: Standard,  fall  Orthopedic Precautions: N/A  Braces: N/A  Respiratory Status: Room air    Occupational Performance:    Bed Mobility:    Patient completed Supine to Sit with minimum assistance  Patient completed Sit to Supine with minimum assistance    Functional Mobility/Transfers:  Patient completed Sit <> Stand Transfer with moderate assistance  with  rolling walker   Patient completed Toilet Transfer Step Transfer technique with moderate assistance with  rolling walker  Functional Mobility: Pt demonstrated minimal LOB during mobility (3x) requiring mod A to correct c RW.     Activities of Daily Living:  Lower Body Dressing: total assistance Total A to cam socks   Toileting: moderate assistance mod A to manage clothing at toilet    Cognitive/Visual Perceptual:  Cognitive/Psychosocial Skills:     -       Oriented to: Person, not oriented to time or city   -       Follows Commands/attention:Easily distracted and Follows one-step commands  -       Safety awareness/insight to disability: intact   -       Mood/Affect/Coping skills/emotional control: Appropriate to situation and Cooperative    Physical Exam:  Upper Extremity Range of Motion:     -       Right Upper Extremity: WFL  -       Left Upper Extremity: WFL  Upper Extremity Strength:    -       Right Upper Extremity: WFL  -       Left Upper Extremity: WFL    Therapeutic Positioning  Risk for acquired pressure injuries is decreased due to ability to get to BSC/toilet with assist.    OT interventions performed during the course of today's session in an effort to prevent and/or reduce acquired pressure injuries:   Therapeutic positioning completed     Skin assessment: all bony prominences were assessed    Findings: no redness or breakdown noted    OT recommendations for therapeutic positioning throughout hospitalization:   Follow [facility] Pressure Injury Prevention Protocol    Patient Education:  Patient provided with verbal education regarding OT role/goals/POC, fall  prevention, safety awareness, and Discharge/DME recommendations.  Understanding was verbalized.     Patient left supine with all lines intact and call button in reach    GOALS:   Multidisciplinary Problems       Occupational Therapy Goals          Problem: Occupational Therapy    Goal Priority Disciplines Outcome Interventions   Occupational Therapy Goal     OT, PT/OT Ongoing, Progressing    Description: Goals to be met by: 8/2/23     Patient will increase functional independence with ADLs by performing:    UE Dressing with CGA.  LE Dressing with CGA.  Grooming while standing at sink with Modified [place].  Toileting from toilet with Stand-by Assistance for hygiene and clothing management.   Toilet transfer to toilet with Stand-by Assistance.                         History:     Past Medical History:   Diagnosis Date    Parkinson's disease     Rhabdomyolysis     Unspecified glaucoma          Past Surgical History:   Procedure Laterality Date    ABDOMINAL SURGERY      CHOLECYSTECTOMY      FOOT SURGERY         Time Tracking:     OT Date of Treatment:    OT Start Time: 1053  OT Stop Time: 1107  OT Total Time (min): 14 min    Billable Minutes:Evaluation moderate complexity    7/19/2023

## 2023-07-19 NOTE — PROCEDURES
Speech Language Pathology Department  Modified Barium Swallow (MBS) Study    Patient Name:  Cristopher Farooq   MRN:  [id number]    Recommendations:     General recommendations:  dysphagia therapy  Repeat MBS study: 5-7 days or as clinically warranted  Diet recommendations:  NPO, Liquid Diet Level: NPO   General precautions: Standard,      History/Reason for Referral:     Cristopher Farooq is a/n 67 y.o. male admitted following a fall out of bed. Pt with a history of parkinson's disease, UTI, and laceration to forehead. Pt has a history of dysphagia.   Past Medical History:   Diagnosis Date    Parkinson's disease     Rhabdomyolysis     Unspecified glaucoma      Past Surgical History:   Procedure Laterality Date    ABDOMINAL SURGERY      CHOLECYSTECTOMY      FOOT SURGERY       A MBS Study was completed to assess the efficiency of his swallow function, rule out aspiration and make recommendations regarding safe dietary consistencies, effective compensatory strategies, and safe eating environment.   Home Diet: Regular and thin liquids  Current Method of Nutrition: NPO    Imaging   Results for orders placed during the hospital encounter of 03/10/23    X-Ray Chest 1 View    Narrative  EXAMINATION:  XR CHEST 1 VIEW    CLINICAL HISTORY:  Injury, unspecified, initial encounter    TECHNIQUE:  AP chest    COMPARISON:  None.    FINDINGS:  The heart is normal in size.  There is no focal airspace consolidation.  There is no pleural effusion or definite visible pneumothorax.    Impression  No acute abnormality of the chest.  Electronically signed by: Agatha Buenrostro  Date:    03/10/2023  Time:    13:16    Results for orders placed during the hospital encounter of 03/10/23    MRI Brain Without Contrast    Narrative  EXAMINATION:  MRI BRAIN WITHOUT CONTRAST    CLINICAL HISTORY:  Stroke, follow up;  TECHNIQUE:  Multiplanar multisequence MR imaging of the brain was performed without contrast.    COMPARISON:  None  FINDINGS:  No intracranial mass or  lesion is seen.  No hemorrhage is seen.  No acute infarct is seen.  No diffusion abnormality seen.  There is diffuse cerebral atrophy seen along with some compensatory ventricular dilatation and periventricular white matter change consistent with patient's age.  Posterior fossa appears normal.  Calvarium is intact.  Paranasal sinuses appear grossly unremarkable.    Impression  Chronic age related changes  Otherwise unremarkable  Electronically signed by: Nona Fernández  Date:    03/10/2023  Time:    15:41    Subjective:     Patient awake and alert.  Spiritual/Cultural/Lutheran Beliefs/Practices that affect care: no  Pain/Comfort: Pain Rating 1: 0/10    Fluoroscopic Results:     Oral Musculature  Secretion Management: adequate  Mucosal Quality: good  Facial Movement: WFL  Buccal Strength & Mobility: WFL  Mandibular Strength & Mobility: WFL    Positioning: upright in bed  Visualization  Patient was seen in the lateral view    Oral Phase:   Prolonged/disorganized bolus formation  Disorganized lingual movement  Loss of bolus control with liquids    Pharyngeal Phase:   Swallow delay with spill to the valleculae/pyriform sinuses  Reduced base of tongue retraction  Reduced epiglottic deflection  Reduced hyolaryngeal excursion  Reduced airway protection  Laryngeal penetration    Aspiration    Consistency Laryngeal Penetration Aspiration Residue   Mildly thick liquid by spoon During the swallow  Did NOT clear During the swallow  SILENT Mild-moderate   Moderately thick liquid by spoon During the swallow None Mild   Moderately thick liquid by straw During the swallow None Mild   Puree During the swallow None Mild       Cervical Esophageal Phase:   residual material visualized      Assessment:     Pt exhibited severe oropharyngeal dysphagia characterized by the findings noted above.  SILENT aspiration of mildly thick liquids did NOT clear placing patient at a HIGH risk for aspiration.  Both swallow safety and efficiency  are impaired.     Patient appears to be at moderate risk for aspiration related pneumonia when considering poor, oral health status, complex overall health/immune status, reduced laryngeal vestibule closure, and severity of dysphagia.  Prognosis for behavioral swallow rehabilitation is poor due to reduced airway protection and cognitive impairements .    Goals:     Multidisciplinary Problems       SLP Goals          Problem: SLP    Goal Priority Disciplines Outcome   SLP Goal     SLP    Description: LTG: To tolerate least restrictive diet without clinical signs/sx of aspiration.   ST. Tongue base/laryngeal excursion exercise   2. Tolerate thermal stimulation x10 with 100% swallow response with less than a 2 second delay.   3. Puree trials without clinical signs/sx of aspiration.                      Patient Education:     Patient provided with verbal education regarding POC.  Understanding was verbalized.    Plan:     SLP Follow-Up:   23    Patient to be seen:    23    Time Tracking:     SLP Treatment Date:    23  Speech Start Time:   1020  Speech Stop Time:     1040  Speech Total Time (min):   20    Billable minutes:   Motion Fluoroscopic Evaluation, Video Recording, 20 minutes     2023

## 2023-07-20 LAB
ALBUMIN SERPL-MCNC: 3.4 G/DL (ref 3.4–4.8)
ALBUMIN/GLOB SERPL: 1.1 RATIO (ref 1.1–2)
ALP SERPL-CCNC: 236 UNIT/L (ref 40–150)
ALT SERPL-CCNC: 10 UNIT/L (ref 0–55)
AST SERPL-CCNC: 20 UNIT/L (ref 5–34)
BACTERIA UR CULT: NO GROWTH
BASOPHILS # BLD AUTO: 0.06 X10(3)/MCL
BASOPHILS NFR BLD AUTO: 0.8 %
BILIRUBIN DIRECT+TOT PNL SERPL-MCNC: 1 MG/DL
BUN SERPL-MCNC: 11.4 MG/DL (ref 8.4–25.7)
CALCIUM SERPL-MCNC: 8.7 MG/DL (ref 8.8–10)
CHLORIDE SERPL-SCNC: 108 MMOL/L (ref 98–107)
CO2 SERPL-SCNC: 22 MMOL/L (ref 23–31)
CREAT SERPL-MCNC: 0.72 MG/DL (ref 0.73–1.18)
EOSINOPHIL # BLD AUTO: 0.13 X10(3)/MCL (ref 0–0.9)
EOSINOPHIL NFR BLD AUTO: 1.8 %
ERYTHROCYTE [DISTWIDTH] IN BLOOD BY AUTOMATED COUNT: 14.8 % (ref 11.5–17)
GFR SERPLBLD CREATININE-BSD FMLA CKD-EPI: >60 MLS/MIN/1.73/M2
GLOBULIN SER-MCNC: 3 GM/DL (ref 2.4–3.5)
GLUCOSE SERPL-MCNC: 80 MG/DL (ref 82–115)
HCT VFR BLD AUTO: 34.8 % (ref 42–52)
HGB BLD-MCNC: 10.9 G/DL (ref 14–18)
IMM GRANULOCYTES # BLD AUTO: 0.06 X10(3)/MCL (ref 0–0.04)
IMM GRANULOCYTES NFR BLD AUTO: 0.8 %
LYMPHOCYTES # BLD AUTO: 1.15 X10(3)/MCL (ref 0.6–4.6)
LYMPHOCYTES NFR BLD AUTO: 16.1 %
MCH RBC QN AUTO: 30.6 PG (ref 27–31)
MCHC RBC AUTO-ENTMCNC: 31.3 G/DL (ref 33–36)
MCV RBC AUTO: 97.8 FL (ref 80–94)
MONOCYTES # BLD AUTO: 0.63 X10(3)/MCL (ref 0.1–1.3)
MONOCYTES NFR BLD AUTO: 8.8 %
NEUTROPHILS # BLD AUTO: 5.12 X10(3)/MCL (ref 2.1–9.2)
NEUTROPHILS NFR BLD AUTO: 71.7 %
NRBC BLD AUTO-RTO: 0 %
PLATELET # BLD AUTO: 422 X10(3)/MCL (ref 130–400)
PMV BLD AUTO: 10.6 FL (ref 7.4–10.4)
POTASSIUM SERPL-SCNC: 4.3 MMOL/L (ref 3.5–5.1)
PROT SERPL-MCNC: 6.4 GM/DL (ref 5.8–7.6)
RBC # BLD AUTO: 3.56 X10(6)/MCL (ref 4.7–6.1)
SODIUM SERPL-SCNC: 140 MMOL/L (ref 136–145)
WBC # SPEC AUTO: 7.15 X10(3)/MCL (ref 4.5–11.5)

## 2023-07-20 PROCEDURE — 80053 COMPREHEN METABOLIC PANEL: CPT | Performed by: INTERNAL MEDICINE

## 2023-07-20 PROCEDURE — 25000003 PHARM REV CODE 250: Performed by: EMERGENCY MEDICINE

## 2023-07-20 PROCEDURE — 25000003 PHARM REV CODE 250: Performed by: STUDENT IN AN ORGANIZED HEALTH CARE EDUCATION/TRAINING PROGRAM

## 2023-07-20 PROCEDURE — 94761 N-INVAS EAR/PLS OXIMETRY MLT: CPT

## 2023-07-20 PROCEDURE — 85025 COMPLETE CBC W/AUTO DIFF WBC: CPT | Performed by: INTERNAL MEDICINE

## 2023-07-20 PROCEDURE — 21400001 HC TELEMETRY ROOM

## 2023-07-20 PROCEDURE — 25000003 PHARM REV CODE 250: Performed by: INTERNAL MEDICINE

## 2023-07-20 PROCEDURE — 25000003 PHARM REV CODE 250

## 2023-07-20 PROCEDURE — 97116 GAIT TRAINING THERAPY: CPT | Mod: CQ

## 2023-07-20 RX ADMIN — ACETAMINOPHEN 650 MG: 325 TABLET, FILM COATED ORAL at 06:07

## 2023-07-20 RX ADMIN — LATANOPROST 1 DROP: 50 SOLUTION OPHTHALMIC at 11:07

## 2023-07-20 RX ADMIN — MIDODRINE HYDROCHLORIDE 10 MG: 5 TABLET ORAL at 08:07

## 2023-07-20 RX ADMIN — BRIMONIDINE TARTRATE 1 DROP: 1.5 SOLUTION OPHTHALMIC at 11:07

## 2023-07-20 RX ADMIN — CARBIDOPA AND LEVODOPA 1 TABLET: 25; 100 TABLET ORAL at 08:07

## 2023-07-20 RX ADMIN — MIDODRINE HYDROCHLORIDE 10 MG: 5 TABLET ORAL at 04:07

## 2023-07-20 RX ADMIN — CARBIDOPA AND LEVODOPA 1 TABLET: 25; 100 TABLET ORAL at 12:07

## 2023-07-20 RX ADMIN — ALPRAZOLAM 0.25 MG: 0.25 TABLET ORAL at 10:07

## 2023-07-20 RX ADMIN — BRIMONIDINE TARTRATE 1 DROP: 1.5 SOLUTION OPHTHALMIC at 10:07

## 2023-07-20 RX ADMIN — BRIMONIDINE TARTRATE 1 DROP: 1.5 SOLUTION OPHTHALMIC at 02:07

## 2023-07-20 RX ADMIN — CARBIDOPA AND LEVODOPA 1 TABLET: 25; 100 TABLET ORAL at 10:07

## 2023-07-20 RX ADMIN — CARBIDOPA AND LEVODOPA 1 TABLET: 25; 100 TABLET ORAL at 04:07

## 2023-07-20 RX ADMIN — AMANTADINE HYDROCHLORIDE 100 MG: 100 CAPSULE, LIQUID FILLED ORAL at 10:07

## 2023-07-20 RX ADMIN — AMANTADINE HYDROCHLORIDE 100 MG: 100 CAPSULE, LIQUID FILLED ORAL at 08:07

## 2023-07-20 RX ADMIN — CIPROFLOXACIN HYDROCHLORIDE 500 MG: 500 TABLET, FILM COATED ORAL at 08:07

## 2023-07-20 RX ADMIN — ALPRAZOLAM 0.25 MG: 0.25 TABLET ORAL at 11:07

## 2023-07-20 RX ADMIN — CIPROFLOXACIN HYDROCHLORIDE 500 MG: 500 TABLET, FILM COATED ORAL at 09:07

## 2023-07-20 NOTE — CONSULTS
Gastroenterology Consult    Reason for Consult:     PEG placement    HPI:  Cristopher Farooq is a 67 y.o. male unknown to our group w/ pmhx of Parkinson's, vision loss, admitted after a fall 7/18/23, sustaining head trauma. He also has a UTI and delirium. Neurology has been following, noting severe bradykinesia, hypophonic speech, tremor, axial rigidity. He is currently confused. He is going to be discharged to inpatient rehab.    MBS yesterday 7/19/23 revealing of severe oropharyngeal dysphagia with silent aspiration noted.     Spoke to patient, niece at bedside, and pt's next of kin his sister on speaker phone. Risks vs. Benefits of PEG placement discussed. They are all in agreement to proceed with placement tomorrow.     No history of major abdominal surgeries.     PCP:  Joel Castillo MD    Review of patient's allergies indicates:   Allergen Reactions    Fish containing products     Shellfish containing products     Tramadol        Current Facility-Administered Medications   Medication Dose Route Frequency Provider Last Rate Last Admin    acetaminophen tablet 650 mg  650 mg Oral Q8H PRN Jaime Romero MD   650 mg at 07/20/23 0641    ALPRAZolam tablet 0.25 mg  0.25 mg Oral BID PRN Elisha Curtis MD   0.25 mg at 07/20/23 1108    amantadine HCL capsule/tablet 100 mg  100 mg Oral BID Elisha Curtis MD   100 mg at 07/20/23 0816    brimonidine 0.15 % OPTH DROP ophthalmic solution 1 drop  1 drop Both Eyes TID Joel Castillo MD   1 drop at 07/20/23 1103    carbidopa-levodopa  mg per tablet 1 tablet  1 tablet Oral QID ROLAN Yanez   1 tablet at 07/20/23 1232    ciprofloxacin HCl tablet 500 mg  500 mg Oral Q12H Joel Castillo MD   500 mg at 07/20/23 0816    latanoprost 0.005 % ophthalmic solution 1 drop  1 drop Both Eyes Daily Joel Castillo MD   1 drop at 07/20/23 1103    melatonin tablet 6 mg  6 mg Oral Nightly PRN Jaime Romero MD   6 mg at 07/18/23 2120     midodrine tablet 10 mg  10 mg Oral BID  Elisha Curtis MD   10 mg at 07/20/23 0815    ondansetron injection 4 mg  4 mg Intravenous Q8H PRN Jaime Romero MD        sodium chloride 0.9% flush 10 mL  10 mL Intravenous PRN Jaime Romero MD         Medications Prior to Admission   Medication Sig Dispense Refill Last Dose    ALPRAZolam (XANAX) 0.25 MG tablet Take 1 tablet (0.25 mg total) by mouth 2 (two) times daily as needed for Anxiety. 10 tablet 0 Past Week    amantadine HCL (SYMMETREL) 100 mg capsule Take 100 mg by mouth 2 (two) times daily.   7/18/2023    brimonidine 0.1% (ALPHAGAN P) 0.1 % Drop Place 1 drop into both eyes 3 (three) times daily.   7/18/2023    carbidopa-levodopa  mg (SINEMET)  mg per tablet Take 1 tablet by mouth 2 (two) times daily.   7/18/2023    latanoprostene bunod (VYZULTA) 0.024 % Drop Apply to eye.   7/18/2023    midodrine (PROAMATINE) 10 MG tablet Take 1 tablet (10 mg total) by mouth 2 (two) times daily with meals. 60 tablet 0        Past Medical History:  Past Medical History:   Diagnosis Date    Parkinson's disease     Rhabdomyolysis     Unspecified glaucoma        Past Surgical History:  Past Surgical History:   Procedure Laterality Date    ABDOMINAL SURGERY      CHOLECYSTECTOMY      FOOT SURGERY         Family History:  Family History   Family history unknown: Yes       Social History:  Social History     Tobacco Use    Smoking status: Unknown    Smokeless tobacco: Not on file   Substance Use Topics    Alcohol use: Not on file           Review of Systems:    Review of Systems   Unable to perform ROS: Mental status change     Objective:      VITAL SIGNS: 24 HR MIN & MAX LAST  Temp  Min: 97.6 °F (36.4 °C)  Max: 98 °F (36.7 °C) 97.7 °F (36.5 °C)  BP  Min: 109/63  Max: 136/69 115/67  Pulse  Min: 56  Max: 118 (!) 56  Resp  Min: 16  Max: 18 18  SpO2  Min: 100 %  Max: 100 % 100 %      Intake/Output Summary (Last 24 hours) at 7/20/2023 1328  Last data filed at 7/20/2023  0548  Gross per 24 hour   Intake 120 ml   Output 1600 ml   Net -1480 ml       Physical Exam  Vitals and nursing note reviewed.   Constitutional:       Appearance: He is ill-appearing.   HENT:      Head: Normocephalic and atraumatic.   Eyes:      General: No scleral icterus.     Extraocular Movements: Extraocular movements intact.   Cardiovascular:      Rate and Rhythm: Normal rate and regular rhythm.      Heart sounds: Normal heart sounds.   Pulmonary:      Effort: Pulmonary effort is normal. No respiratory distress.      Breath sounds: Normal breath sounds.   Abdominal:      General: Abdomen is flat. Bowel sounds are normal. There is no distension.      Palpations: Abdomen is soft.      Tenderness: There is no abdominal tenderness.   Musculoskeletal:         General: No swelling or deformity. Normal range of motion.      Right lower leg: No edema.      Left lower leg: No edema.   Skin:     General: Skin is warm and dry.   Neurological:      Mental Status: He is alert. He is disoriented.      Motor: Weakness present.       Recent Results (from the past 48 hour(s))   Urinalysis, Reflex to Urine Culture    Collection Time: 07/18/23  4:02 PM    Specimen: Urine   Result Value Ref Range    Color, UA Dark Yellow Yellow, Light-Yellow, Dark Yellow, Radha, Straw    Appearance, UA Turbid (A) Clear    Specific Gravity, UA >=1.040 (H) 1.005 - 1.030    pH, UA 6.0 5.0 - 8.5    Protein, UA 1+ (A) Negative    Glucose, UA Negative Negative, Normal    Ketones, UA Trace (A) Negative    Blood, UA Negative Negative    Bilirubin, UA 1+ (A) Negative    Urobilinogen, UA 1.0 0.2, 1.0, Normal    Nitrites, UA Negative Negative    Leukocyte Esterase, UA 2+ (A) Negative   Urinalysis, Microscopic    Collection Time: 07/18/23  4:02 PM   Result Value Ref Range    RBC, UA 0-2 None Seen, 0-2, 3-5, 0-5 /HPF    WBC, UA >100 (A) None Seen, 0-2, 3-5, 0-5 /HPF    Squamous Epithelial Cells, UA 0-4 0-4, None Seen /HPF    Bacteria, UA Many (A) None Seen,  Rare, Occasional /HPF    Calcium Oxalate Crystals, UA Occasional (A) None Seen /HPF   Urine culture    Collection Time: 07/18/23  4:02 PM    Specimen: Urine   Result Value Ref Range    Urine Culture No Growth    Comprehensive metabolic panel    Collection Time: 07/19/23  5:56 AM   Result Value Ref Range    Sodium Level 140 136 - 145 mmol/L    Potassium Level 3.9 3.5 - 5.1 mmol/L    Chloride 109 (H) 98 - 107 mmol/L    Carbon Dioxide 25 23 - 31 mmol/L    Glucose Level 86 82 - 115 mg/dL    Blood Urea Nitrogen 20.5 8.4 - 25.7 mg/dL    Creatinine 0.78 0.73 - 1.18 mg/dL    Calcium Level Total 8.7 (L) 8.8 - 10.0 mg/dL    Protein Total 6.6 5.8 - 7.6 gm/dL    Albumin Level 3.5 3.4 - 4.8 g/dL    Globulin 3.1 2.4 - 3.5 gm/dL    Albumin/Globulin Ratio 1.1 1.1 - 2.0 ratio    Bilirubin Total 1.0 <=1.5 mg/dL    Alkaline Phosphatase 224 (H) 40 - 150 unit/L    Alanine Aminotransferase 7 0 - 55 unit/L    Aspartate Aminotransferase 22 5 - 34 unit/L    eGFR >60 mls/min/1.73/m2   CBC with Differential    Collection Time: 07/19/23  5:56 AM   Result Value Ref Range    WBC 8.22 4.50 - 11.50 x10(3)/mcL    RBC 3.54 (L) 4.70 - 6.10 x10(6)/mcL    Hgb 11.1 (L) 14.0 - 18.0 g/dL    Hct 34.5 (L) 42.0 - 52.0 %    MCV 97.5 (H) 80.0 - 94.0 fL    MCH 31.4 (H) 27.0 - 31.0 pg    MCHC 32.2 (L) 33.0 - 36.0 g/dL    RDW 15.2 11.5 - 17.0 %    Platelet 434 (H) 130 - 400 x10(3)/mcL    MPV 10.3 7.4 - 10.4 fL    Neut % 71.4 %    Lymph % 16.2 %    Mono % 8.8 %    Eos % 1.8 %    Basophil % 0.7 %    Lymph # 1.33 0.6 - 4.6 x10(3)/mcL    Neut # 5.87 2.1 - 9.2 x10(3)/mcL    Mono # 0.72 0.1 - 1.3 x10(3)/mcL    Eos # 0.15 0 - 0.9 x10(3)/mcL    Baso # 0.06 <=0.2 x10(3)/mcL    IG# 0.09 (H) 0 - 0.04 x10(3)/mcL    IG% 1.1 %    NRBC% 0.0 %   Comprehensive Metabolic Panel    Collection Time: 07/20/23  5:38 AM   Result Value Ref Range    Sodium Level 140 136 - 145 mmol/L    Potassium Level 4.3 3.5 - 5.1 mmol/L    Chloride 108 (H) 98 - 107 mmol/L    Carbon Dioxide 22 (L) 23 -  31 mmol/L    Glucose Level 80 (L) 82 - 115 mg/dL    Blood Urea Nitrogen 11.4 8.4 - 25.7 mg/dL    Creatinine 0.72 (L) 0.73 - 1.18 mg/dL    Calcium Level Total 8.7 (L) 8.8 - 10.0 mg/dL    Protein Total 6.4 5.8 - 7.6 gm/dL    Albumin Level 3.4 3.4 - 4.8 g/dL    Globulin 3.0 2.4 - 3.5 gm/dL    Albumin/Globulin Ratio 1.1 1.1 - 2.0 ratio    Bilirubin Total 1.0 <=1.5 mg/dL    Alkaline Phosphatase 236 (H) 40 - 150 unit/L    Alanine Aminotransferase 10 0 - 55 unit/L    Aspartate Aminotransferase 20 5 - 34 unit/L    eGFR >60 mls/min/1.73/m2   CBC with Differential    Collection Time: 07/20/23  5:38 AM   Result Value Ref Range    WBC 7.15 4.50 - 11.50 x10(3)/mcL    RBC 3.56 (L) 4.70 - 6.10 x10(6)/mcL    Hgb 10.9 (L) 14.0 - 18.0 g/dL    Hct 34.8 (L) 42.0 - 52.0 %    MCV 97.8 (H) 80.0 - 94.0 fL    MCH 30.6 27.0 - 31.0 pg    MCHC 31.3 (L) 33.0 - 36.0 g/dL    RDW 14.8 11.5 - 17.0 %    Platelet 422 (H) 130 - 400 x10(3)/mcL    MPV 10.6 (H) 7.4 - 10.4 fL    Neut % 71.7 %    Lymph % 16.1 %    Mono % 8.8 %    Eos % 1.8 %    Basophil % 0.8 %    Lymph # 1.15 0.6 - 4.6 x10(3)/mcL    Neut # 5.12 2.1 - 9.2 x10(3)/mcL    Mono # 0.63 0.1 - 1.3 x10(3)/mcL    Eos # 0.13 0 - 0.9 x10(3)/mcL    Baso # 0.06 <=0.2 x10(3)/mcL    IG# 0.06 (H) 0 - 0.04 x10(3)/mcL    IG% 0.8 %    NRBC% 0.0 %       CT Head Without Contrast    Result Date: 7/18/2023  EXAMINATION: CT of the head without contrast CLINICAL HISTORY: Head trauma, injury TECHNIQUE: Routine CT of the head was performed without contrast Total DLP: 968 mGy.cm Automatic exposure control was utilized to reduce the patient's dose COMPARISON: 05/30/2023 FINDINGS: There is no acute intracranial hemorrhage, midline shift, mass-effect, or extra-axial collection.  The ventricles and sulci are globally proportional prominent compatible with moderate involutional changes.  There are mild patchy areas of decreased attenuation in the periventricular subcortical white matter, while nonspecific, most commonly  sequela of chronic microvascular ischemia.  No sulcal effacement.  Gray-white matter differentiation is preserved.  There is right frontal scalp soft tissue injury and swelling evident.  The visualized osseous structures are intact.  The visualized paranasal sinuses and mastoid air cells are clear.     No acute intracranial abnormality Chronic changes as above Right frontal scalp injury and swelling. Electronically signed by: Valentín Guerrero MD Date:    07/18/2023 Time:    10:49    CT Cervical Spine Without Contrast    Result Date: 7/18/2023  EXAMINATION: CT CERVICAL SPINE WITHOUT CONTRAST CLINICAL HISTORY: Neck trauma (Age >= 65y); TECHNIQUE: Noncontrast CT images of the cervical spine. Axial, coronal, and sagittal reformatted images were obtained. Dose length product is 299 mGycm. Automatic exposure control, adjustment of mA/kV or iterative reconstruction technique was used to limit radiation dose. COMPARISON: MRI cervical spine dated 03/13/2023 FINDINGS: The cervical spine is visualized through the level of T1. There is no acute fracture identified.  There is a healing fracture of the right posterior 1st rib.  There is straightening of normal cervical lordosis.  There are mild degenerative changes with disc height loss, marginal osteophyte formation and facet arthropathy.  There is no paraspinal hematoma.     No acute fracture identified. Electronically signed by: Agatha Buenrostro Date:    07/18/2023 Time:    10:52    Fl Modified Barium Swallow Speech    Result Date: 7/19/2023  See procedure notes from Speech Pathologist. This procedure was auto-finalized.    Assessment & Plan:    67 y.o. male unknown to our group w/ pmhx of Parkinson's, vision loss, admitted after a fall 7/18/23, sustaining head trauma. Noted to have significant impairment from Parkinson's, pt failed MBS 7/19/23.    Need for alternative means of nutrition  - plan for EGD/PEG placement tomorrow       Thank you for allowing us to participate in  this patient's care.    Rita Chong PA-C  [facility], [facility]

## 2023-07-20 NOTE — PT/OT/SLP PROGRESS
SLP attempting therapy x2, however pt receiving ADLs from nursing and MD in room talking with family. SLP to continue to follow and treat as appropriate.

## 2023-07-20 NOTE — PLAN OF CARE
Problem: Adult Inpatient Plan of Care  Goal: Plan of Care Review  7/20/2023 1731 by Anya Bernstein LPN  Outcome: Ongoing, Progressing  7/20/2023 1711 by Anya Bernstein LPN  Outcome: Ongoing, Progressing  Goal: Patient-Specific Goal (Individualized)  7/20/2023 1731 by Anya Berntsein LPN  Outcome: Ongoing, Progressing  7/20/2023 1711 by Anya Berntsein LPN  Outcome: Ongoing, Progressing  Goal: Absence of Hospital-Acquired Illness or Injury  7/20/2023 1731 by Anya Bernstein LPN  Outcome: Ongoing, Progressing  7/20/2023 1711 by Anya Bernstein LPN  Outcome: Ongoing, Progressing  Goal: Optimal Comfort and Wellbeing  7/20/2023 1731 by Anya Bernstein LPN  Outcome: Ongoing, Progressing  7/20/2023 1711 by Anya Bernstein LPN  Outcome: Ongoing, Progressing  Goal: Readiness for Transition of Care  7/20/2023 1731 by Anya Bernstein LPN  Outcome: Ongoing, Progressing  7/20/2023 1711 by Anya Bernstein LPN  Outcome: Ongoing, Progressing     Problem: Impaired Wound Healing  Goal: Optimal Wound Healing  7/20/2023 1731 by Anya Bernstein LPN  Outcome: Ongoing, Progressing  7/20/2023 1711 by Anya Bernstein LPN  Outcome: Ongoing, Progressing     Problem: Skin Injury Risk Increased  Goal: Skin Health and Integrity  7/20/2023 1731 by Anya Bernstein LPN  Outcome: Ongoing, Not Progressing  7/20/2023 1711 by Anya Bernstein LPN  Outcome: Ongoing, Progressing     Problem: Fall Injury Risk  Goal: Absence of Fall and Fall-Related Injury  7/20/2023 1731 by Anya Bernstein LPN  Outcome: Ongoing, Not Progressing  7/20/2023 1711 by Anya Bernstein LPN  Outcome: Ongoing, Progressing

## 2023-07-20 NOTE — PT/OT/SLP PROGRESS
Physical Therapy Treatment    Patient Name:  Cristopher Farooq   MRN:  [id number]    Recommendations:     Discharge Recommendations:  nursing facility, skilled   Discharge Equipment Recommendations: none     Subjective     Patient awake and alert.     Objective:     General Precautions: Standard, fall   Orthopedic Precautions:    Braces:    Respiratory Status: Room air  Communicated with nurse prior to treatment.     Functional Mobility:    Rolling:Minimal Assistance  Supine to sit:Minimal Assistance  Sit to stand transfer: Minimal Assistance  Bed to chair transfer:Minimal Assistance    100 ft x 3 with RW and 100 ft with min HHA but better with AD. Roll belt and bed alarm and family at bedside.      Skin Integrity: Visible skin intact    PT interventions performed during the course of today's session in an effort to prevent and/or reduce acquired pressure injuries:   Therapeutic positioning completed       GOALS:   Multidisciplinary Problems       Physical Therapy Goals          Problem: Physical Therapy    Goal Priority Disciplines Outcome Goal Variances Interventions   Physical Therapy Goal     PT, PT/OT Ongoing, Progressing     Description: Goals to be met by: 2023     Patient will increase functional independence with mobility by performin. Supine to sit with Modified [place]  2. Sit to stand transfer with Stand-by Assistance  3. Gait  x 200 feet with Stand-by Assistance using Rolling Walker.                          Assessment:     Cristopher Farooq is a 67 y.o. male admitted with a medical diagnosis of <principal problem not specified>.     Patient Active Problem List   Diagnosis    Rhabdomyolysis    Bradykinesia    Parkinson's disease        Rehab Prognosis: Good; patient would benefit from acute skilled PT services to address these deficits and reach maximum level of function.    Recent Surgery: * No surgery found *      Plan:     During this hospitalization, patient to be seen 6 x/week to address the  identified rehab impairments via gait training, therapeutic activities, therapeutic exercises, neuromuscular re-education and progress toward the following goals:    Plan of Care Expires:  08/19/23    Billable Minutes     Billable Minutes: Gait Training 24    Treatment Type: Treatment  PT/PTA: PTA     Number of PTA visits since last PT visit: 1 25 07/20/2023  ]

## 2023-07-20 NOTE — PROGRESS NOTES
[facility] - 5th Floor [facility] Medicine  Progress Note    Patient Name: Cristopher Farooq  MRN: [id number]  Patient Class: IP- Inpatient   Admission Date: 7/18/2023  Length of Stay: 2 days  Attending Physician: Joel Castillo,*  Primary Care Provider: Joel Castillo MD        Subjective: Patient having problems with Parkinson exacerbation, multiple falls.  Now he failed his MBS and SLT recommend a PEG placement . Consulted GI for this purpose. He continue to do PO meds with pudding and tolerating well. [facility] accepted for [facility] will wait until PEG is placed and feedings start.     Principal Problem:Dysphagia    Interval History: As above    Review of Systems   Neurological:  Positive for tremors and weakness.   Psychiatric/Behavioral:  Positive for confusion.    All other systems reviewed and are negative.  Objective:     Vital Signs (Most Recent):  Temp: 97.7 °F (36.5 °C) (07/20/23 1100)  Pulse: (!) 56 (07/20/23 1100)  Resp: 18 (07/20/23 1100)  BP: 115/67 (07/20/23 1100)  SpO2: 100 % (07/20/23 1100) Vital Signs (24h Range):  Temp:  [97.6 °F (36.4 °C)-98 °F (36.7 °C)] 97.7 °F (36.5 °C)  Pulse:  [] 56  Resp:  [16-18] 18  SpO2:  [100 %] 100 %  BP: (109-136)/(63-69) 115/67     Weight: 77.1 kg (170 lb)  Body mass index is 28.29 kg/m².    Intake/Output Summary (Last 24 hours) at 7/20/2023 1326  Last data filed at 7/20/2023 0548  Gross per 24 hour   Intake 120 ml   Output 1600 ml   Net -1480 ml      Physical Exam  Vitals and nursing note reviewed. Exam conducted with a chaperone present.   Constitutional:       Appearance: Normal appearance.   HENT:      Head: Normocephalic and atraumatic.      Right Ear: Tympanic membrane, ear canal and external ear normal.      Left Ear: Tympanic membrane, ear canal and external ear normal.      Nose: Nose normal.      Mouth/Throat:      Mouth: Mucous membranes are moist.   Eyes:      Extraocular Movements: Extraocular movements intact.       Pupils: Pupils are equal, round, and reactive to light.   Cardiovascular:      Rate and Rhythm: Normal rate and regular rhythm.      Pulses: Normal pulses.      Heart sounds: Normal heart sounds.   Pulmonary:      Effort: Pulmonary effort is normal.      Breath sounds: Normal breath sounds.   Abdominal:      General: Abdomen is flat.      Palpations: Abdomen is soft.   Musculoskeletal:         General: Normal range of motion.      Cervical back: Normal range of motion and neck supple.   Skin:     General: Skin is warm and dry.   Neurological:      General: No focal deficit present.      Mental Status: He is alert and oriented to person, place, and time.   Psychiatric:         Mood and Affect: Mood normal.         Behavior: Behavior normal.         Overview/Hospital Course: AS above    Significant Labs: All pertinent labs within the past 24 hours have been reviewed.  CBC:   Recent Labs   Lab 07/19/23  0556 07/20/23  0538   WBC 8.22 7.15   HGB 11.1* 10.9*   HCT 34.5* 34.8*   * 422*     CMP:   Recent Labs   Lab 07/19/23  0556 07/20/23  0538    140   K 3.9 4.3   CO2 25 22*   BUN 20.5 11.4   CREATININE 0.78 0.72*   CALCIUM 8.7* 8.7*   ALBUMIN 3.5 3.4   BILITOT 1.0 1.0   ALKPHOS 224* 236*   AST 22 20   ALT 7 10       Significant Imaging: I have reviewed all pertinent imaging results/findings within the past 24 hours.    Assessment/Plan:  1-Parkinson Exacerbation  3-[id number]-ZGS failure  3-Severe weakness and falling risk  Continue management consulted GI for PEG placement       Active Diagnoses:    Diagnosis Date Noted POA    Parkinson's disease [G20] 07/18/2023 Unknown      Problems Resolved During this Admission:     VTE Risk Mitigation (From admission, onward)           Ordered     Place DEBRA hose  Until discontinued         07/18/23 1220     IP VTE LOW RISK PATIENT  Once         07/18/23 1220                       Joel Castillo MD  Department of Hospital Medicine   [facility] -  5th Floor Med Surg

## 2023-07-21 ENCOUNTER — ANESTHESIA EVENT (OUTPATIENT)
Dept: ENDOSCOPY | Facility: HOSPITAL | Age: 68
DRG: 057 | End: 2023-07-21
Payer: MEDICARE

## 2023-07-21 ENCOUNTER — ANESTHESIA (OUTPATIENT)
Dept: ENDOSCOPY | Facility: HOSPITAL | Age: 68
DRG: 057 | End: 2023-07-21
Payer: MEDICAID

## 2023-07-21 LAB
INR BLD: 1.18 (ref 0–1.3)
PROTHROMBIN TIME: 14.9 SECONDS (ref 12.5–14.5)

## 2023-07-21 PROCEDURE — 25000003 PHARM REV CODE 250: Performed by: INTERNAL MEDICINE

## 2023-07-21 PROCEDURE — 25000003 PHARM REV CODE 250: Performed by: NURSE ANESTHETIST, CERTIFIED REGISTERED

## 2023-07-21 PROCEDURE — 63600175 PHARM REV CODE 636 W HCPCS: Performed by: NURSE ANESTHETIST, CERTIFIED REGISTERED

## 2023-07-21 PROCEDURE — D9220A PRA ANESTHESIA: ICD-10-PCS | Mod: ANES,,, | Performed by: ANESTHESIOLOGY

## 2023-07-21 PROCEDURE — 37000009 HC ANESTHESIA EA ADD 15 MINS: Performed by: STUDENT IN AN ORGANIZED HEALTH CARE EDUCATION/TRAINING PROGRAM

## 2023-07-21 PROCEDURE — 85610 PROTHROMBIN TIME: CPT | Performed by: PHYSICIAN ASSISTANT

## 2023-07-21 PROCEDURE — D9220A PRA ANESTHESIA: ICD-10-PCS | Mod: CRNA,,, | Performed by: NURSE ANESTHETIST, CERTIFIED REGISTERED

## 2023-07-21 PROCEDURE — D9220A PRA ANESTHESIA: Mod: ANES,,, | Performed by: ANESTHESIOLOGY

## 2023-07-21 PROCEDURE — 25000003 PHARM REV CODE 250

## 2023-07-21 PROCEDURE — 27201423 OPTIME MED/SURG SUP & DEVICES STERILE SUPPLY: Performed by: STUDENT IN AN ORGANIZED HEALTH CARE EDUCATION/TRAINING PROGRAM

## 2023-07-21 PROCEDURE — 94761 N-INVAS EAR/PLS OXIMETRY MLT: CPT

## 2023-07-21 PROCEDURE — 37000008 HC ANESTHESIA 1ST 15 MINUTES: Performed by: STUDENT IN AN ORGANIZED HEALTH CARE EDUCATION/TRAINING PROGRAM

## 2023-07-21 PROCEDURE — 25000003 PHARM REV CODE 250: Performed by: EMERGENCY MEDICINE

## 2023-07-21 PROCEDURE — 63600175 PHARM REV CODE 636 W HCPCS: Performed by: PHYSICIAN ASSISTANT

## 2023-07-21 PROCEDURE — 21400001 HC TELEMETRY ROOM

## 2023-07-21 PROCEDURE — 43246 EGD PLACE GASTROSTOMY TUBE: CPT | Performed by: STUDENT IN AN ORGANIZED HEALTH CARE EDUCATION/TRAINING PROGRAM

## 2023-07-21 PROCEDURE — D9220A PRA ANESTHESIA: Mod: CRNA,,, | Performed by: NURSE ANESTHETIST, CERTIFIED REGISTERED

## 2023-07-21 RX ORDER — PROPOFOL 10 MG/ML
INJECTION, EMULSION INTRAVENOUS CONTINUOUS PRN
Status: DISCONTINUED | OUTPATIENT
Start: 2023-07-21 | End: 2023-07-21

## 2023-07-21 RX ORDER — CEFAZOLIN SODIUM 1 G/3ML
INJECTION, POWDER, FOR SOLUTION INTRAMUSCULAR; INTRAVENOUS
Status: DISCONTINUED | OUTPATIENT
Start: 2023-07-21 | End: 2023-07-21

## 2023-07-21 RX ORDER — PHENYLEPHRINE HCL IN 0.9% NACL 1 MG/10 ML
SYRINGE (ML) INTRAVENOUS
Status: DISPENSED
Start: 2023-07-21 | End: 2023-07-21

## 2023-07-21 RX ORDER — LIDOCAINE HYDROCHLORIDE 20 MG/ML
INJECTION INTRAVENOUS
Status: DISCONTINUED | OUTPATIENT
Start: 2023-07-21 | End: 2023-07-21

## 2023-07-21 RX ORDER — SODIUM CHLORIDE 9 MG/ML
INJECTION, SOLUTION INTRAVENOUS CONTINUOUS
Status: CANCELLED | OUTPATIENT
Start: 2023-07-21

## 2023-07-21 RX ORDER — LIDOCAINE HYDROCHLORIDE 10 MG/ML
1 INJECTION, SOLUTION EPIDURAL; INFILTRATION; INTRACAUDAL; PERINEURAL ONCE
Status: CANCELLED | OUTPATIENT
Start: 2023-07-21 | End: 2023-07-21

## 2023-07-21 RX ORDER — PANTOPRAZOLE SODIUM 40 MG/1
40 FOR SUSPENSION ORAL DAILY
Status: CANCELLED | OUTPATIENT
Start: 2023-07-21

## 2023-07-21 RX ORDER — CEFAZOLIN SODIUM 1 G/3ML
INJECTION, POWDER, FOR SOLUTION INTRAMUSCULAR; INTRAVENOUS
Status: DISPENSED
Start: 2023-07-21 | End: 2023-07-21

## 2023-07-21 RX ORDER — ONDANSETRON 2 MG/ML
4 INJECTION INTRAMUSCULAR; INTRAVENOUS ONCE
Status: CANCELLED | OUTPATIENT
Start: 2023-07-21 | End: 2023-07-21

## 2023-07-21 RX ORDER — PROPOFOL 10 MG/ML
VIAL (ML) INTRAVENOUS
Status: COMPLETED
Start: 2023-07-21 | End: 2023-07-21

## 2023-07-21 RX ORDER — CEFAZOLIN SODIUM 2 G/50ML
2 SOLUTION INTRAVENOUS ONCE
Status: COMPLETED | OUTPATIENT
Start: 2023-07-21 | End: 2023-07-21

## 2023-07-21 RX ORDER — PHENYLEPHRINE HCL IN 0.9% NACL 1 MG/10 ML
SYRINGE (ML) INTRAVENOUS
Status: DISCONTINUED | OUTPATIENT
Start: 2023-07-21 | End: 2023-07-21

## 2023-07-21 RX ADMIN — CARBIDOPA AND LEVODOPA 1 TABLET: 25; 100 TABLET ORAL at 06:07

## 2023-07-21 RX ADMIN — CARBIDOPA AND LEVODOPA 1 TABLET: 25; 100 TABLET ORAL at 08:07

## 2023-07-21 RX ADMIN — CIPROFLOXACIN HYDROCHLORIDE 500 MG: 500 TABLET, FILM COATED ORAL at 08:07

## 2023-07-21 RX ADMIN — CEFAZOLIN 1 G: 330 INJECTION, POWDER, FOR SOLUTION INTRAMUSCULAR; INTRAVENOUS at 09:07

## 2023-07-21 RX ADMIN — Medication 100 MCG: at 09:07

## 2023-07-21 RX ADMIN — PROPOFOL 200 MCG/KG/MIN: 10 INJECTION, EMULSION INTRAVENOUS at 09:07

## 2023-07-21 RX ADMIN — CEFAZOLIN SODIUM 2 G: 2 SOLUTION INTRAVENOUS at 01:07

## 2023-07-21 RX ADMIN — LIDOCAINE HYDROCHLORIDE 40 MG: 20 INJECTION INTRAVENOUS at 09:07

## 2023-07-21 RX ADMIN — BRIMONIDINE TARTRATE 1 DROP: 1.5 SOLUTION OPHTHALMIC at 08:07

## 2023-07-21 RX ADMIN — AMANTADINE HYDROCHLORIDE 100 MG: 100 CAPSULE, LIQUID FILLED ORAL at 08:07

## 2023-07-21 RX ADMIN — BRIMONIDINE TARTRATE 1 DROP: 1.5 SOLUTION OPHTHALMIC at 03:07

## 2023-07-21 NOTE — PLAN OF CARE
Problem: Adult Inpatient Plan of Care  Goal: Plan of Care Review  Outcome: Ongoing, Progressing  Goal: Absence of Hospital-Acquired Illness or Injury  Outcome: Ongoing, Progressing  Goal: Optimal Comfort and Wellbeing  Outcome: Ongoing, Progressing     Problem: Impaired Wound Healing  Goal: Optimal Wound Healing  Outcome: Ongoing, Progressing     Problem: Skin Injury Risk Increased  Goal: Skin Health and Integrity  Outcome: Ongoing, Progressing     Problem: Fall Injury Risk  Goal: Absence of Fall and Fall-Related Injury  Outcome: Ongoing, Progressing

## 2023-07-21 NOTE — PT/OT/SLP PROGRESS
Physical Therapy      Patient Name:  Cristopher Farooq   MRN:  [id number]    Patient not seen today secondary to sedated and just returned from getting peg tube.

## 2023-07-21 NOTE — ANESTHESIA POSTPROCEDURE EVALUATION
Anesthesia Post Evaluation    Patient: Cristopher Farooq    Procedure(s) Performed: Procedure(s) (LRB):  PEG (N/A)    Final Anesthesia Type: MAC      Patient location during evaluation: PACU  Patient participation: Yes- Able to Participate  Level of consciousness: awake and alert  Post-procedure vital signs: reviewed and stable  Pain management: adequate  Airway patency: patent  OLINDA mitigation strategies: Extubation and recovery carried out in lateral, semiupright, or other nonsupine position  PONV status at discharge: No PONV  Anesthetic complications: no      Cardiovascular status: blood pressure returned to baseline  Respiratory status: room air and unassisted  Hydration status: euvolemic  Follow-up not needed.  Comments: PT. Appears to have adequately recovered for Anesthetic. VSS, airway patient. No apparent complications noted.          Vitals Value Taken Time   /81 07/21/23 1029   Temp 36.3 °C (97.3 °F) 07/21/23 0942   Pulse 58 07/21/23 1029   Resp 16 07/21/23 1029   SpO2 96 % 07/21/23 1029         No case tracking events are documented in the log.      Pain/Bowen Score: Pain Rating Prior to Med Admin: 3 (7/20/2023  6:41 AM)  Pain Rating Post Med Admin: 2 (7/20/2023  7:41 AM)  Bowen Score: 9 (7/21/2023 10:20 AM)

## 2023-07-21 NOTE — PROVATION PATIENT INSTRUCTIONS
Discharge Summary/Instructions after an Endoscopic Procedure  Patient Name: Cristopher Farooq  Patient MRN: [id number]  Patient YOB: 1955  Friday, July 21, 2023  Primitivo Duke MD  Dear patient,  As a result of recent federal legislation (The Federal Cures Act), you may   receive lab or pathology results from your procedure in your [facility]   account before your physician is able to contact you. Your physician or   their representative will relay the results to you with their   recommendations at their soonest availability.  Thank you,  RESTRICTIONS:  During your procedure today, you received medications for sedation.  These   medications may affect your judgment, balance and coordination.  Therefore,   for 24 hours, you have the following restrictions:   - DO NOT drive a car, operate machinery, make legal/financial decisions,   sign important papers or drink alcohol.    ACTIVITY:  Today: no heavy lifting, straining or running due to procedural   sedation/anesthesia.  The following day: return to full activity including work.  DIET:  Eat and drink normally unless instructed otherwise.     TREATMENT FOR COMMON SIDE EFFECTS:  - Mild abdominal pain, nausea, belching, bloating or excessive gas:  rest,   eat lightly and use a heating pad.  - Sore Throat: treat with throat lozenges and/or gargle with warm salt   water.  - Because air was used during the procedure, expelling large amounts of air   from your rectum or belching is normal.  - If a bowel prep was taken, you may not have a bowel movement for 1-3 days.    This is normal.  SYMPTOMS TO WATCH FOR AND REPORT TO YOUR PHYSICIAN:  1. Abdominal pain or bloating, other than gas cramps.  2. Chest pain.  3. Back pain.  4. Signs of infection such as: chills or fever occurring within 24 hours   after the procedure.  5. Rectal bleeding, which would show as bright red, maroon, or black stools.   (A tablespoon of blood from the rectum is not serious, especially if    hemorrhoids are present.)  6. Vomiting.  7. Weakness or dizziness.  GO DIRECTLY TO THE NEAREST EMERGENCY ROOM IF YOU HAVE ANY OF THE FOLLOWING:      Difficulty breathing              Chills and/or fever over 101 F   Persistent vomiting and/or vomiting blood   Severe abdominal pain   Severe chest pain   Black, tarry stools   Bleeding- more than one tablespoon   Any other symptom or condition that you feel may need urgent attention  Your doctor recommends these additional instructions:  If any biopsies were taken, your doctors clinic will contact you in 1 to 2   weeks with any results.  - [facility] to [facility].   - Resume previous diet.   - Continue present medications.   - Resume Lovenox (enoxaparin) and anticoagulant medication at prior doses in   2 days.   - Please follow the post-PEG recommendations including: Nutrition consult   for formula and volume, advance food and medications per primary care   provider, external bolster snug to abdominal wall, change dressing on top   of bumper daily, NPO x4 hrs then water today, may use PEG tomorrow for   feedings, antibiotic ointment to site and clean site with soap and water   daily and dry thoroughly.  For questions, problems or results please call your physician - Primitivo Duke MD at Work:  [phone].  [facility], EMERGENCY ROOM PHONE NUMBER: [phone]  IF A COMPLICATION OR EMERGENCY SITUATION ARISES AND YOU ARE UNABLE TO REACH   YOUR PHYSICIAN - GO DIRECTLY TO THE EMERGENCY ROOM.  MD Primitivo Sierra MD  7/21/2023 9:59:06 AM  This report has been verified and signed electronically.  Dear patient,  As a result of recent federal legislation (The Federal Cures Act), you may   receive lab or pathology results from your procedure in your [facility]   account before your physician is able to contact you. Your physician or   their representative will relay the results to you with their   recommendations at their  soonest availability.  Thank you,  PROVATION

## 2023-07-21 NOTE — PROGRESS NOTES
[facility] - 5th Floor [facility] Medicine  Progress Note    Patient Name: Cristopher Farooq  MRN: [id number]  Patient Class: IP- Inpatient   Admission Date: 7/18/2023  Length of Stay: 3 days  Attending Physician: Joel Castillo,*  Primary Care Provider: Joel Castillo MD        Subjective:     Principal Problem:<principal problem not specified>    Interval History: Patient got his PEG today will see how he doing during the weekend with feedings. If tolerated will discharge to [facility] Monday. No new complaints.    Review of Systems   HENT:  Positive for trouble swallowing.    Musculoskeletal:  Positive for gait problem.   Neurological:  Positive for speech difficulty and weakness.   All other systems reviewed and are negative.  Objective:     Vital Signs (Most Recent):  Temp: 97.7 °F (36.5 °C) (07/21/23 1545)  Pulse: 62 (07/21/23 1133)  Resp: 18 (07/21/23 1545)  BP: 120/74 (07/21/23 1545)  SpO2: (!) 79 % (07/21/23 1545) Vital Signs (24h Range):  Temp:  [96.8 °F (36 °C)-97.9 °F (36.6 °C)] 97.7 °F (36.5 °C)  Pulse:  [52-63] 62  Resp:  [13-18] 18  SpO2:  [79 %-100 %] 79 %  BP: ()/(60-87) 120/74     Weight: 77.1 kg (170 lb)  Body mass index is 28.29 kg/m².    Intake/Output Summary (Last 24 hours) at 7/21/2023 1630  Last data filed at 7/21/2023 1419  Gross per 24 hour   Intake 20 ml   Output 1450 ml   Net -1430 ml      Physical Exam  Constitutional:       Appearance: Normal appearance.   HENT:      Head: Normocephalic and atraumatic.      Right Ear: Tympanic membrane, ear canal and external ear normal.      Left Ear: Tympanic membrane, ear canal and external ear normal.      Nose: Nose normal.      Mouth/Throat:      Mouth: Mucous membranes are moist.   Eyes:      Extraocular Movements: Extraocular movements intact.      Pupils: Pupils are equal, round, and reactive to light.   Cardiovascular:      Rate and Rhythm: Normal rate and regular rhythm.      Pulses: Normal pulses.       Heart sounds: Normal heart sounds.   Pulmonary:      Effort: Pulmonary effort is normal.      Breath sounds: Normal breath sounds.   Abdominal:      General: Abdomen is flat.      Palpations: Abdomen is soft.   Musculoskeletal:         General: Normal range of motion.      Cervical back: Normal range of motion and neck supple.   Skin:     General: Skin is warm and dry.   Neurological:      General: No focal deficit present.      Mental Status: He is alert and oriented to person, place, and time.   Psychiatric:         Mood and Affect: Mood normal.         Behavior: Behavior normal.         Overview/Hospital Course: As above noted    Significant Labs: All pertinent labs within the past 24 hours have been reviewed.  CBC:   Recent Labs   Lab 07/20/23  0538   WBC 7.15   HGB 10.9*   HCT 34.8*   *     CMP:   Recent Labs   Lab 07/20/23  0538      K 4.3   CO2 22*   BUN 11.4   CREATININE 0.72*   CALCIUM 8.7*   ALBUMIN 3.4   BILITOT 1.0   ALKPHOS 236*   AST 20   ALT 10       Significant Imaging: I have reviewed all pertinent imaging results/findings within the past 24 hours.    Assessment/Plan:  1-Parkison exacerbation  2-Dysphagia PEG placement  3-Fall risk  4-HTN  Will follow thru week end and discharge to [facility] Monday if ready      Active Diagnoses:    Diagnosis Date Noted POA    Parkinson's disease [G20] 07/18/2023 Unknown      Problems Resolved During this Admission:     VTE Risk Mitigation (From admission, onward)           Ordered     Place DEBRA hose  Until discontinued         07/18/23 1220     IP VTE LOW RISK PATIENT  Once         07/18/23 1220                       Joel Castillo MD  Department of Hospital Medicine   [facility] - 5th Floor Med Surg

## 2023-07-21 NOTE — ANESTHESIA PREPROCEDURE EVALUATION
07/21/2023  Cristopher Farooq is a 67 y.o., male w/ pmhx of Parkinson's, vision loss, admitted after a fall 7/18/23, sustaining head trauma. He also has a UTI and delirium. Neurology has been following, noting severe bradykinesia, hypophonic speech, tremor, axial rigidity. He is currently confused. He is going to be discharged to inpatient rehab.     MBS yesterday 7/19/23 revealing of severe oropharyngeal dysphagia with silent aspiration noted.    Today he presents for PEG placement.    Other Medical History   Unspecified glaucoma Rhabdomyolysis   Parkinson's disease      Surgical History    ABDOMINAL SURGERY FOOT SURGERY   CHOLECYSTECTOMY        Pre-op Assessment    I have reviewed the Patient Summary Reports.     I have reviewed the Nursing Notes. I have reviewed the NPO Status.   I have reviewed the Medications.     Review of Systems  Anesthesia Hx:  No problems with previous Anesthesia    Social:  Non-Smoker    Cardiovascular:   ECG has been reviewed.    Neurological:   Neuromuscular Disease,        Physical Exam  General: Alert, Confusion and Flat Affect    Airway:  Mallampati: unable to assess   Mouth Opening: Normal  TM Distance: Normal  Neck ROM: Normal ROM    Dental:  Intact    Chest/Lungs:  Clear to auscultation, Normal Respiratory Rate    Heart:  Rate: Normal  Rhythm: Regular Rhythm  Sounds: Normal    Abdomen:  Normal, Soft, Nontender        Anesthesia Plan  Type of Anesthesia, risks & benefits discussed:    Anesthesia Type: MAC  Intra-op Monitoring Plan: Standard ASA Monitors  Post Op Pain Control Plan: multimodal analgesia  Induction:  IV  Airway Plan: Direct  Informed Consent: Informed consent signed with the Patient and all parties understand the risks and agree with anesthesia plan.  All questions answered.   ASA Score: 3  Day of Surgery Review of History & Physical: H&P Update referred to the  surgeon/provider.    Ready For Surgery From Anesthesia Perspective.     .

## 2023-07-22 PROBLEM — E44.0 MODERATE MALNUTRITION: Status: ACTIVE | Noted: 2023-07-22

## 2023-07-22 PROCEDURE — 25000003 PHARM REV CODE 250

## 2023-07-22 PROCEDURE — 21400001 HC TELEMETRY ROOM

## 2023-07-22 PROCEDURE — 25000003 PHARM REV CODE 250: Performed by: INTERNAL MEDICINE

## 2023-07-22 PROCEDURE — 25000003 PHARM REV CODE 250: Performed by: EMERGENCY MEDICINE

## 2023-07-22 RX ADMIN — MIDODRINE HYDROCHLORIDE 10 MG: 5 TABLET ORAL at 08:07

## 2023-07-22 RX ADMIN — BRIMONIDINE TARTRATE 1 DROP: 1.5 SOLUTION OPHTHALMIC at 02:07

## 2023-07-22 RX ADMIN — LATANOPROST 1 DROP: 50 SOLUTION OPHTHALMIC at 08:07

## 2023-07-22 RX ADMIN — BRIMONIDINE TARTRATE 1 DROP: 1.5 SOLUTION OPHTHALMIC at 08:07

## 2023-07-22 RX ADMIN — CARBIDOPA AND LEVODOPA 1 TABLET: 25; 100 TABLET ORAL at 08:07

## 2023-07-22 RX ADMIN — CIPROFLOXACIN HYDROCHLORIDE 500 MG: 500 TABLET, FILM COATED ORAL at 08:07

## 2023-07-22 RX ADMIN — CARBIDOPA AND LEVODOPA 1 TABLET: 25; 100 TABLET ORAL at 01:07

## 2023-07-22 RX ADMIN — CARBIDOPA AND LEVODOPA 1 TABLET: 25; 100 TABLET ORAL at 05:07

## 2023-07-22 RX ADMIN — AMANTADINE HYDROCHLORIDE 100 MG: 100 CAPSULE, LIQUID FILLED ORAL at 08:07

## 2023-07-22 RX ADMIN — MIDODRINE HYDROCHLORIDE 10 MG: 5 TABLET ORAL at 05:07

## 2023-07-22 NOTE — PROGRESS NOTES
[facility] - 5th Floor [facility] Medicine  Progress Note    Patient Name: Cristopher Farooq  MRN: [id number]  Patient Class: IP- Inpatient   Admission Date: 7/18/2023  Length of Stay: 4 days  Attending Physician: Joel Castillo,*  Primary Care Provider: Joel Castillo MD        Subjective:     Principal Problem:<principal problem not specified>    Interval History: Patient stable PEG feeding to start and medications. Will try to transfer to [facility] Monday. Family addressed.    Review of Systems   HENT:  Positive for trouble swallowing.    Neurological:  Positive for tremors and weakness.   All other systems reviewed and are negative.  Objective:     Vital Signs (Most Recent):  Temp: 97 °F (36.1 °C) (07/22/23 1144)  Pulse: 61 (07/22/23 1144)  Resp: 16 (07/22/23 1144)  BP: 116/72 (07/22/23 1144)  SpO2: 100 % (07/22/23 1144) Vital Signs (24h Range):  Temp:  [97 °F (36.1 °C)-98.1 °F (36.7 °C)] 97 °F (36.1 °C)  Pulse:  [61-78] 61  Resp:  [16-18] 16  SpO2:  [79 %-100 %] 100 %  BP: (116-133)/(72-77) 116/72     Weight: 54.5 kg (120 lb 2.4 oz)  Body mass index is 19.99 kg/m².    Intake/Output Summary (Last 24 hours) at 7/22/2023 1423  Last data filed at 7/22/2023 1144  Gross per 24 hour   Intake --   Output 150 ml   Net -150 ml      Physical Exam  Vitals and nursing note reviewed. Exam conducted with a chaperone present.   Constitutional:       Appearance: Normal appearance.   HENT:      Head: Normocephalic and atraumatic.      Right Ear: Tympanic membrane, ear canal and external ear normal.      Left Ear: Tympanic membrane, ear canal and external ear normal.      Nose: Nose normal.      Mouth/Throat:      Mouth: Mucous membranes are moist.   Eyes:      Extraocular Movements: Extraocular movements intact.      Pupils: Pupils are equal, round, and reactive to light.   Cardiovascular:      Rate and Rhythm: Normal rate.      Pulses: Normal pulses.      Heart sounds: Normal heart sounds.    Pulmonary:      Effort: Pulmonary effort is normal.      Breath sounds: Normal breath sounds.   Abdominal:      General: Abdomen is flat.      Palpations: Abdomen is soft.   Musculoskeletal:         General: Normal range of motion.      Cervical back: Normal range of motion and neck supple.   Skin:     General: Skin is warm and dry.   Neurological:      General: No focal deficit present.      Mental Status: He is alert and oriented to person, place, and time.   Psychiatric:         Mood and Affect: Mood normal.         Behavior: Behavior normal.         Overview/Hospital Course: As above    Significant Labs: All pertinent labs within the past 24 hours have been reviewed.  CMP: No results for input(s): NA, K, CL, CO2, GLU, BUN, CREATININE, CALCIUM, PROT, ALBUMIN, BILITOT, ALKPHOS, AST, ALT, ANIONGAP, EGFRNONAA in the last 48 hours.    Invalid input(s): [place]    Significant Imaging: I have reviewed all pertinent imaging results/findings within the past 24 hours.    Assessment/Plan:  1-Parkinson exacerbation  2-Dysphagia aspiration/failed   3-HTN  4-Fall risk  Will follow closely with consultants      Active Diagnoses:    Diagnosis Date Noted POA    Moderate malnutrition [E44.0] 07/22/2023 Yes    Parkinson's disease [G20] 07/18/2023 Unknown      Problems Resolved During this Admission:     VTE Risk Mitigation (From admission, onward)           Ordered     Place DEBRA hose  Until discontinued         07/18/23 1220     IP VTE LOW RISK PATIENT  Once         07/18/23 1220                       Joel Castillo MD  Department of Hospital Medicine   [facility] - 5th Floor Med Surg

## 2023-07-22 NOTE — PROGRESS NOTES
"Gastroenterology Progress Note  The history and physical as detailed by the provider below.  The patient has had no problems post PEG and he has been started on tube feedings.  We will sign off.  Please call as needed  Subjective/Interval History:  7-22-23  PEG site CDI  Abd binder on  Sitter at bedside    ROS:    Unable to perform ROS: Mental status change   Vital Signs:  /75   Pulse 63   Temp 97.6 °F (36.4 °C) (Axillary)   Resp 18   Ht 5' 5" (1.651 m)   Wt 54.5 kg (120 lb 2.4 oz)   SpO2 100%   BMI 19.99 kg/m²   Body mass index is 19.99 kg/m².    Physical Exam:  Constitutional:       Appearance: He is ill-appearing.   HENT:      Head: Normocephalic and atraumatic.   Eyes:      General: No scleral icterus.     Extraocular Movements: Extraocular movements intact.   Cardiovascular:      Rate and Rhythm: Normal rate and regular rhythm.      Heart sounds: Normal heart sounds.   Pulmonary:      Effort: Pulmonary effort is normal. No respiratory distress.      Breath sounds: Normal breath sounds.   Abdominal:      General: Abdomen is flat. Bowel sounds are normal. There is no distension.      Palpations: Abdomen is soft.      Tenderness: There is no abdominal tenderness.   Musculoskeletal:         General: No swelling or deformity. Normal range of motion.      Right lower leg: No edema.      Left lower leg: No edema.   Skin:     General: Skin is warm and dry.   Neurological:      Mental Status: He is alert. He is disoriented.      Motor: Weakness present.   Labs:  Recent Results (from the past 48 hour(s))   Protime-INR    Collection Time: 07/21/23  6:36 AM   Result Value Ref Range    PT 14.9 (H) 12.5 - 14.5 seconds    INR 1.18 0.00 - 1.30         Assessment/Plan:  Need for alternative means of nutrition   S/p PEG    PEG site CDI - ok to use  Consult dietician  Use abd binder  No further GI recs     Jayna Vega NP acting as scribe for Dr. Soo Sanchez.    "

## 2023-07-22 NOTE — CONSULTS
Inpatient Nutrition Assessment    Admit Date: 7/18/2023   Total duration of encounter: 4 days     Nutrition Recommendation/Prescription     Advance continuous tube feeding by 10ml q4hr as tolerated to goal rate.  Isosource 1.5 @ 65ml/hr, goal rate, + 35ml free water flush q1hr will provide:  1950kcals  88g protein  1688ml free water     Communication of Recommendations: reviewed with nurse, reviewed with patient, and reviewed with family    Nutrition Assessment     Malnutrition Assessment/Nutrition-Focused Physical Exam    Malnutrition Context: chronic illness  Malnutrition Level: moderate  Energy Intake (Malnutrition):  (does not meet criteria)  Weight Loss (Malnutrition):  (does not meet criteria)     Orbital Region (Subcutaneous Fat Loss): mild depletion  Upper Arm Region (Subcutaneous Fat Loss): severe depletion        Presybeterian Region (Muscle Loss): severe depletion  Clavicle Bone Region (Muscle Loss): mild depletion  Clavicle and Acromion Bone Region (Muscle Loss): mild depletion     Dorsal Hand (Muscle Loss): mild depletion                    A minimum of two characteristics is recommended for diagnosis of either severe or non-severe malnutrition.    Chart Review    Reason Seen: physician consult for PEG placed    Malnutrition Screening Tool Results   Have you recently lost weight without trying?: No  Have you been eating poorly because of a decreased appetite?: No   MST Score: 0     Diagnosis:  Parkinson exacerbation  Dysphagia PEG placement  Fall risk  HTN    Relevant Medical History:   Past Medical History:   Diagnosis Date    Parkinson's disease     Rhabdomyolysis     Unspecified glaucoma       Nutrition-Related Medications: noted   Calorie Containing IV Medications: no significant kcals from medications at this time    Nutrition-Related Labs:  7/22/23 Cl 108, CO2 22, Gluc 80, Ca 8.7,     Diet/PN Order: Diet NPO Except for: Medication (CRUSHED IN PUREEE)  Oral Supplement Order: none  Tube Feeding  "Order:  Isosource 1.5 @ 65ml/hr + 25ml/hr water flush (see below for calculation)  Appetite/Oral Intake: NPO/NPO  Factors Affecting Nutritional Intake: difficulty/impaired swallowing and NPO  Food/Confucianism/Cultural Preferences: none reported  Food Allergies: fish and shellfish    Skin Integrity: bruised (ecchymotic)  Wound(s):       Comments    23 Daughter reports pt has been tolerating a "soft" diet for a while now. Denies any significant weight loss or decrease in appetite over the last year. UBW of 160lbs years ago and has slowly lost weight to 120lbs which she reports to be stable for the last year. PEG placed yesterday s/t failed MBSS: severe oropharyngeal dysphagia per SLP. Tube feeds running at 10ml/hr. Will increase free water flushes to 35ml/hr.    Anthropometrics    Height: 5' 5" (165.1 cm) Height Method: Stated  Last Weight: 54.5 kg (120 lb 2.4 oz) (23 0855) Weight Method: Bed Scale  BMI (Calculated): 20  BMI Classification: underweight (BMI less than 22 if >65 years of age)        Ideal Body Weight (IBW), Male: 136 lb     % Ideal Body Weight, Male (lb): 88.24 %                          Usual Weight Provided By: family/caregiver    Wt Readings from Last 5 Encounters:   23 54.5 kg (120 lb 2.4 oz)   23 53.5 kg (118 lb)   23 52.9 kg (116 lb 10 oz)   03/10/23 52.4 kg (115 lb 8 oz)     Weight Change(s) Since Admission:  Admit Weight: 77.1 kg (170 lb) (23 8133)  23 54.5kg correct wt, admit wt of 77.1kg incorrect  Estimated Needs    Weight Used For Calorie Calculations: 54.4 kg (119 lb 14.9 oz)  Energy Calorie Requirements (kcal): 1632-1904kcals/d (30-35kcals/kg)  Energy Need Method: Kcal/kg  Weight Used For Protein Calculations: 54.4 kg (119 lb 14.9 oz)  Protein Requirements: 70g/d (1.3g/kg)  Fluid Requirements (mL): 1632-1904ml fl/d (1ml/kcal)  Temp (24hrs), Av.6 °F (36.4 °C), Min:97.3 °F (36.3 °C), Max:98.1 °F (36.7 °C)       Enteral Nutrition    Formula: Isosource " 1.5 Javi  Rate/Volume: 10ml/hr  Water Flushes: 25ml/hr  Additives/Modulars: none at this time  Route: PEG tube  Method: continuous  Total Nutrition Provided by Tube Feeding, Additives, and Flushes:  Calories Provided  300 kcal/d, 18% needs   Protein Provided  14 g/d, 20% needs   Fluid Provided  652 ml/d, 40% needs   Continuous feeding calculations based on estimated 20 hr/d run time unless otherwise stated.    Parenteral Nutrition    Patient not receiving parenteral nutrition support at this time.    Evaluation of Received Nutrient Intake    Calories: not meeting estimated needs  Protein: not meeting estimated needs    Patient Education    Not applicable.    Nutrition Diagnosis     PES: Malnutrition related to chronic illness as evidenced by muscle and fat wasting, underweight BMI for age. (new)    Interventions/Goals     Intervention(s): modified composition of enteral nutrition, modified rate of enteral nutrition, and collaboration with other providers  Goal: Meet greater than 75% of nutritional needs by follow-up. (new)    Monitoring & Evaluation     Dietitian will monitor energy intake, enteral nutrition intake, weight change, electrolyte/renal panel, and glucose/endocrine profile.  Nutrition Risk/Follow-Up: moderate (follow-up in 3-5 days)   Please consult if re-assessment needed sooner.

## 2023-07-23 PROCEDURE — 25000003 PHARM REV CODE 250: Performed by: EMERGENCY MEDICINE

## 2023-07-23 PROCEDURE — 25000003 PHARM REV CODE 250: Performed by: INTERNAL MEDICINE

## 2023-07-23 PROCEDURE — 25000003 PHARM REV CODE 250

## 2023-07-23 PROCEDURE — 21400001 HC TELEMETRY ROOM

## 2023-07-23 PROCEDURE — 97116 GAIT TRAINING THERAPY: CPT | Mod: CQ

## 2023-07-23 PROCEDURE — 97530 THERAPEUTIC ACTIVITIES: CPT | Mod: CQ

## 2023-07-23 RX ADMIN — CARBIDOPA AND LEVODOPA 1 TABLET: 25; 100 TABLET ORAL at 08:07

## 2023-07-23 RX ADMIN — CIPROFLOXACIN HYDROCHLORIDE 500 MG: 500 TABLET, FILM COATED ORAL at 08:07

## 2023-07-23 RX ADMIN — CARBIDOPA AND LEVODOPA 1 TABLET: 25; 100 TABLET ORAL at 01:07

## 2023-07-23 RX ADMIN — BRIMONIDINE TARTRATE 1 DROP: 1.5 SOLUTION OPHTHALMIC at 03:07

## 2023-07-23 RX ADMIN — BRIMONIDINE TARTRATE 1 DROP: 1.5 SOLUTION OPHTHALMIC at 08:07

## 2023-07-23 RX ADMIN — CARBIDOPA AND LEVODOPA 1 TABLET: 25; 100 TABLET ORAL at 05:07

## 2023-07-23 RX ADMIN — AMANTADINE HYDROCHLORIDE 100 MG: 100 CAPSULE, LIQUID FILLED ORAL at 08:07

## 2023-07-23 RX ADMIN — MIDODRINE HYDROCHLORIDE 10 MG: 5 TABLET ORAL at 08:07

## 2023-07-23 RX ADMIN — LATANOPROST 1 DROP: 50 SOLUTION OPHTHALMIC at 08:07

## 2023-07-23 RX ADMIN — MIDODRINE HYDROCHLORIDE 10 MG: 5 TABLET ORAL at 05:07

## 2023-07-23 NOTE — PLAN OF CARE
Problem: Adult Inpatient Plan of Care  Goal: Patient-Specific Goal (Individualized)  Outcome: Ongoing, Progressing     Problem: Adult Inpatient Plan of Care  Goal: Absence of Hospital-Acquired Illness or Injury  Outcome: Ongoing, Progressing     Problem: Adult Inpatient Plan of Care  Goal: Optimal Comfort and Wellbeing  Outcome: Ongoing, Progressing     Problem: Adult Inpatient Plan of Care  Goal: Readiness for Transition of Care  Outcome: Ongoing, Progressing     Problem: Impaired Wound Healing  Goal: Optimal Wound Healing  Outcome: Ongoing, Progressing     Problem: Skin Injury Risk Increased  Goal: Skin Health and Integrity  Outcome: Ongoing, Progressing     Problem: Fall Injury Risk  Goal: Absence of Fall and Fall-Related Injury  Outcome: Ongoing, Progressing

## 2023-07-23 NOTE — PT/OT/SLP PROGRESS
Physical Therapy Treatment    Patient Name:  Cristopher Farooq   MRN:  [id number]    Recommendations:     Discharge Recommendations: nursing facility, skilled  Discharge Equipment Recommendations: none  Barriers to discharge: None    Assessment:     Cristopher Farooq is a 67 y.o. male admitted with a medical diagnosis of <principal problem not specified>.  He presents with the following impairments/functional limitations: weakness, impaired functional mobility, gait instability, impaired balance, decreased coordination, decreased safety awareness .    Rehab Prognosis: Good; patient would benefit from acute skilled PT services to address these deficits and reach maximum level of function.    Recent Surgery: Procedure(s) (LRB):  PEG (N/A) 2 Days Post-Op    Plan:     During this hospitalization, patient to be seen 6 x/week to address the identified rehab impairments via gait training, therapeutic activities, therapeutic exercises, neuromuscular re-education and progress toward the following goals:    Plan of Care Expires:  08/19/23    Subjective     Chief Complaint: none   Patient/Family Comments/goals: Pt states he does not use a walker and does not think he needs to use one  Pain/Comfort:  Pain Rating 1: 0/10      Objective:     Communicated with pts nurse prior to session.  Patient found HOB elevated with blood pressure cuff, PEG Tube, peripheral IV, restraints, telemetry upon PT entry to room.     General Precautions: Standard, fall  Orthopedic Precautions: N/A  Braces: N/A  Respiratory Status: Room air  Skin Integrity: Wound R forehead      Functional Mobility:  Bed Mobility:     Scooting: stand by assistance  Supine to Sit: contact guard assistance and minimum assistance  Transfers:     Sit to Stand:  contact guard assistance and minimum assistance with rolling walker and from EOB and chair x 4 reps  Bed to Chair: contact guard assistance and minimum assistance with  rolling walker  using  Step Transfer  Gait: Pt ambulated 200 ft  with RW requiring CGA and verbal cues to increase step width.  Pt ambulated 25 ft w/o AD, CGA with one LOB during turn, pt self correcting.  Balance: Static standing balance w/o AD, FT, EO and EC, no LOB, Dynamic balance, performing stepping pattern w/ and w/o AD.  Pt had difficulty maintaining balance w/o AD, performing stepping pattern much better with walker.    Education:  Patient provided with verbal and demonstration education regarding safety with ambulation w/ and w/o AD.  Understanding was verbalized, however additional teaching warranted.     Patient left up in chair with call button in reach and chair alarm on..    GOALS:   Multidisciplinary Problems       Physical Therapy Goals          Problem: Physical Therapy    Goal Priority Disciplines Outcome Goal Variances Interventions   Physical Therapy Goal     PT, PT/OT Ongoing, Progressing     Description: Goals to be met by: 2023     Patient will increase functional independence with mobility by performin. Supine to sit with Modified [place]  2. Sit to stand transfer with Stand-by Assistance  3. Gait  x 200 feet with Stand-by Assistance using Rolling Walker.                          Time Tracking:     PT Received On: 23  PT Start Time: 1250     PT Stop Time: 1314  PT Total Time (min): 24 min     Billable Minutes: Gait Training 11 and Therapeutic Activity 13    Treatment Type: Treatment  PT/PTA: PTA     Number of PTA visits since last PT visit: 2     2023

## 2023-07-23 NOTE — PROGRESS NOTES
[facility] - 5th Floor [facility] Medicine  Progress Note    Patient Name: Cristopher Farooq  MRN: [id number]  Patient Class: IP- Inpatient   Admission Date: 7/18/2023  Length of Stay: 5 days  Attending Physician: Joel Castillo,*  Primary Care Provider: Joel Castillo MD        Subjective:     Principal Problem:<principal problem not specified>    Interval History: Patient doing well overall with PEG, will discharge to rehab in AM if stable.    Review of Systems  Objective:     Vital Signs (Most Recent):  Temp: 97.1 °F (36.2 °C) (07/23/23 1146)  Pulse: 71 (07/23/23 1526)  Resp: 19 (07/23/23 0753)  BP: 105/67 (07/23/23 1526)  SpO2: 100 % (07/23/23 1526) Vital Signs (24h Range):  Temp:  [97.1 °F (36.2 °C)-97.8 °F (36.6 °C)] 97.1 °F (36.2 °C)  Pulse:  [57-76] 71  Resp:  [16-19] 19  SpO2:  [96 %-100 %] 100 %  BP: ()/(59-77) 105/67     Weight: 54.5 kg (120 lb 2.4 oz)  Body mass index is 19.99 kg/m².    Intake/Output Summary (Last 24 hours) at 7/23/2023 1805  Last data filed at 7/23/2023 0522  Gross per 24 hour   Intake 470 ml   Output --   Net 470 ml      Physical Exam  Vitals and nursing note reviewed. Exam conducted with a chaperone present.   Constitutional:       Appearance: Normal appearance.   HENT:      Head: Normocephalic and atraumatic.      Right Ear: Tympanic membrane, ear canal and external ear normal.      Left Ear: Tympanic membrane, ear canal and external ear normal.      Nose: Nose normal.      Mouth/Throat:      Mouth: Mucous membranes are moist.   Eyes:      Extraocular Movements: Extraocular movements intact.      Pupils: Pupils are equal, round, and reactive to light.   Cardiovascular:      Rate and Rhythm: Normal rate and regular rhythm.      Pulses: Normal pulses.      Heart sounds: Normal heart sounds.   Pulmonary:      Effort: Pulmonary effort is normal.      Breath sounds: Normal breath sounds.   Abdominal:      General: Abdomen is flat.       Palpations: Abdomen is soft.   Musculoskeletal:         General: Normal range of motion.      Cervical back: Normal range of motion and neck supple.   Skin:     General: Skin is warm and dry.   Neurological:      General: No focal deficit present.      Mental Status: He is alert and oriented to person, place, and time.   Psychiatric:         Mood and Affect: Mood normal.         Behavior: Behavior normal.         Overview/Hospital Course: As above    Significant Labs: All pertinent labs within the past 24 hours have been reviewed.  CBC: No results for input(s): WBC, HGB, HCT, PLT in the last 48 hours.  CMP: No results for input(s): NA, K, CL, CO2, GLU, BUN, CREATININE, CALCIUM, PROT, ALBUMIN, BILITOT, ALKPHOS, AST, ALT, ANIONGAP, EGFRNONAA in the last 48 hours.    Invalid input(s): ESTGFAFR[place]    Significant Imaging: I have reviewed all pertinent imaging results/findings within the past 24 hours.    Assessment/Plan:  1-Parkinson exacerbation  2-Dysphagia  3-PEG placement  Continue management will discharge to [facility] Monday      Active Diagnoses:    Diagnosis Date Noted POA    Moderate malnutrition [E44.0] 07/22/2023 Yes    Parkinson's disease [G20] 07/18/2023 Unknown      Problems Resolved During this Admission:     VTE Risk Mitigation (From admission, onward)           Ordered     Place DEBRA hose  Until discontinued         07/18/23 1220     IP VTE LOW RISK PATIENT  Once         07/18/23 1220                       Joel Castillo MD  Department of Hospital Medicine   [facility] - 5th Floor Med Surg

## 2023-07-23 NOTE — PLAN OF CARE
Problem: Adult Inpatient Plan of Care  Goal: Absence of Hospital-Acquired Illness or Injury  Outcome: Ongoing, Progressing     Problem: Adult Inpatient Plan of Care  Goal: Optimal Comfort and Wellbeing  Outcome: Ongoing, Progressing     Problem: Impaired Wound Healing  Goal: Optimal Wound Healing  Outcome: Ongoing, Progressing     Problem: Skin Injury Risk Increased  Goal: Skin Health and Integrity  Outcome: Ongoing, Progressing     Problem: Fall Injury Risk  Goal: Absence of Fall and Fall-Related Injury  Outcome: Ongoing, Progressing

## 2023-07-24 VITALS
TEMPERATURE: 98 F | WEIGHT: 120.13 LBS | BODY MASS INDEX: 20.01 KG/M2 | OXYGEN SATURATION: 98 % | HEART RATE: 70 BPM | SYSTOLIC BLOOD PRESSURE: 114 MMHG | RESPIRATION RATE: 18 BRPM | DIASTOLIC BLOOD PRESSURE: 79 MMHG | HEIGHT: 65 IN

## 2023-07-24 PROCEDURE — 97530 THERAPEUTIC ACTIVITIES: CPT | Mod: CQ

## 2023-07-24 PROCEDURE — 25000003 PHARM REV CODE 250: Performed by: EMERGENCY MEDICINE

## 2023-07-24 PROCEDURE — 92526 ORAL FUNCTION THERAPY: CPT

## 2023-07-24 PROCEDURE — 97116 GAIT TRAINING THERAPY: CPT | Mod: CQ

## 2023-07-24 PROCEDURE — 97530 THERAPEUTIC ACTIVITIES: CPT

## 2023-07-24 PROCEDURE — 25000003 PHARM REV CODE 250

## 2023-07-24 PROCEDURE — 25000003 PHARM REV CODE 250: Performed by: INTERNAL MEDICINE

## 2023-07-24 PROCEDURE — 63600175 PHARM REV CODE 636 W HCPCS: Performed by: STUDENT IN AN ORGANIZED HEALTH CARE EDUCATION/TRAINING PROGRAM

## 2023-07-24 RX ADMIN — CARBIDOPA AND LEVODOPA 1 TABLET: 25; 100 TABLET ORAL at 08:07

## 2023-07-24 RX ADMIN — ONDANSETRON 4 MG: 2 INJECTION INTRAMUSCULAR; INTRAVENOUS at 12:07

## 2023-07-24 RX ADMIN — BRIMONIDINE TARTRATE 1 DROP: 1.5 SOLUTION OPHTHALMIC at 02:07

## 2023-07-24 RX ADMIN — CARBIDOPA AND LEVODOPA 1 TABLET: 25; 100 TABLET ORAL at 05:07

## 2023-07-24 RX ADMIN — MIDODRINE HYDROCHLORIDE 10 MG: 5 TABLET ORAL at 05:07

## 2023-07-24 RX ADMIN — BRIMONIDINE TARTRATE 1 DROP: 1.5 SOLUTION OPHTHALMIC at 08:07

## 2023-07-24 RX ADMIN — CARBIDOPA AND LEVODOPA 1 TABLET: 25; 100 TABLET ORAL at 12:07

## 2023-07-24 RX ADMIN — CIPROFLOXACIN HYDROCHLORIDE 500 MG: 500 TABLET, FILM COATED ORAL at 08:07

## 2023-07-24 RX ADMIN — MIDODRINE HYDROCHLORIDE 10 MG: 5 TABLET ORAL at 08:07

## 2023-07-24 RX ADMIN — AMANTADINE HYDROCHLORIDE 100 MG: 100 CAPSULE, LIQUID FILLED ORAL at 08:07

## 2023-07-24 RX ADMIN — LATANOPROST 1 DROP: 50 SOLUTION OPHTHALMIC at 08:07

## 2023-07-24 NOTE — DISCHARGE SUMMARY
[facility] - 5th Floor [facility] Medicine  Discharge Summary      Patient Name: Cristopher Farooq  MRN: [id number]  Admission Date: 7/18/2023  Hospital Length of Stay: 6 days  Discharge Date and Time:  07/24/2023 4:52 PM  Attending Physician: Joel Heda,*   Discharging Provider: Joel Head MD  Discharge Provider Team: Networked reference to record PCT   Primary Care Provider: Joel Head MD        HPI: Patient admitted with severe declining and Parkinson exacerbation, multiple falls and dysphagia    Procedure(s) (LRB):  PEG (N/A)      Hospital Course: Patient admitted and evaluated, PT OT and SLP consulted. Patient Parkison medication was adjusted, SLP recommended a swallow study to access his dysphagia. Patient did fail his swallowing evaluation and PEG was requested. PEG placed on 7/21 and feeding started over the weekend. [facility] was consulted and patient was accepted on 7/24 and discharge in stable condition     Consults:   Consults (From admission, onward)          Status Ordering Provider     Inpatient consult to Registered Dietitian/Nutritionist  Once        Provider:  (Not yet assigned)    Completed JOEL HEAD     Inpatient consult to Gastroenterology  Once        Provider:  Gilles Dao MD    Completed JOEL HEAD     Inpatient consult to Social Work/Case Management  Once        Provider:  (Not yet assigned)    Completed JOEL HEAD     Inpatient consult to Neurology  Once        Provider:  Megan South MD    Completed INDRA PHAN            Final Active Diagnoses:    Diagnosis Date Noted POA    PRINCIPAL PROBLEM:  Parkinson's disease [G20] 07/18/2023 Yes    Moderate malnutrition [E44.0] 07/22/2023 Yes      Problems Resolved During this Admission:      Discharged Condition: good    Disposition: Rehab Facility    Follow Up:    Patient Instructions:      Diet NPO     Activity as tolerated      Medications:  Reconciled Home Medications:      Medication List        CONTINUE taking these medications      ALPHAGAN P 0.1 % Drop  Generic drug: brimonidine 0.1%  Place 1 drop into both eyes 3 (three) times daily.     ALPRAZolam 0.25 MG tablet  Commonly known as: XANAX  Take 1 tablet (0.25 mg total) by mouth 2 (two) times daily as needed for Anxiety.     amantadine  mg capsule  Commonly known as: SYMMETREL  Take 100 mg by mouth 2 (two) times daily.     carbidopa-levodopa  mg  mg per tablet  Commonly known as: SINEMET  Take 1 tablet by mouth 2 (two) times daily.     midodrine 10 MG tablet  Commonly known as: PROAMATINE  Take 1 tablet (10 mg total) by mouth 2 (two) times daily with meals.     VYZULTA 0.024 % Drop  Generic drug: latanoprostene bunod  Apply to eye.              Significant Diagnostic Studies: Labs: CMP No results for input(s): NA, K, CL, CO2, GLU, BUN, CREATININE, CALCIUM, PROT, ALBUMIN, BILITOT, ALKPHOS, AST, ALT, ANIONGAP, ESTGFRAFRICA, EGFRNONAA in the last 48 hours. and CBC No results for input(s): WBC, HGB, HCT, PLT in the last 48 hours.  Endoscopy: PEG Placement    Pending Diagnostic Studies:       None          Indwelling Lines/Drains at time of discharge:   Lines/Drains/Airways       None                   Time spent on the discharge of patient: 60 minutes         Joel Castillo MD  Department of Hospital Medicine  [facility] - 5th Floor Med Surg

## 2023-07-24 NOTE — PT/OT/SLP PROGRESS
Speech Language Pathology Department  Dysphagia Therapy Progress Note    Patient Name:  Cristopher Farooq   MRN:  [id number]    Recommendations:     General recommendations:  dysphagia therapy  Diet recommendations:  NPO, Liquid Diet Level: NPO     Subjective     Patient awake and alert.  Pain/Comfort:  0/10  Spiritual/Cultural/[employer] Beliefs/Practices that affect care: no  Objective:     Therapeutic Activities:  Pt completed base of tongue and laryngeal x20 with moderate-maximum cues.  Pt tolerated thermal stimulation to the anterior faucial pillars x10 with 80 swallow responses.     Assessment:     Pt continues to present with oropharyngeal dysphagia and remains unsafe for PO diet.    Goals:     Multidisciplinary Problems       SLP Goals          Problem: SLP    Goal Priority Disciplines Outcome   SLP Goal     SLP    Description: LTG: To tolerate least restrictive diet without clinical signs/sx of aspiration.   ST. Tongue base/laryngeal excursion exercise   2. Tolerate thermal stimulation x10 with 100% swallow response with less than a 2 second delay.   3. Puree trials without clinical signs/sx of aspiration.                      Patient Education:     Patient provided with verbal education regarding POC.  Understanding was verbalized.    Plan:     Will continue to follow and tx as appropriate.    Time Tracking:     SLP Treatment Date:    23  Speech Start Time:   1000  Speech Stop Time:    1010    Speech Total Time (min):   10    Billable minutes:  Treatment of Swallow Dysfunction, 10 minutes       2023

## 2023-07-24 NOTE — PT/OT/SLP PROGRESS
"Physical Therapy Treatment    Patient Name:  Cristopher Farooq   MRN:  [id number]    Recommendations:     Discharge Recommendations: nursing facility, skilled  Discharge Equipment Recommendations: none  Barriers to discharge:  placement    Assessment:     Cristopher Farooq is a 67 y.o. male admitted with a medical diagnosis of parkinson's disease, UTI, laceration to forehead.  He presents with the following impairments/functional limitations: weakness, impaired endurance, impaired functional mobility, gait instability, impaired balance, decreased coordination, decreased safety awareness, pain .    Rehab Prognosis: Good; patient would benefit from acute skilled PT services to address these deficits and reach maximum level of function.    Recent Surgery: Procedure(s) (LRB):  PEG (N/A) 3 Days Post-Op    Plan:     During this hospitalization, patient to be seen 6 x/week to address the identified rehab impairments via gait training, therapeutic activities, therapeutic exercises and progress toward the following goals:    Plan of Care Expires:  08/19/23    Subjective     Chief Complaint: none  Patient/Family Comments/goals:   Pain/Comfort:         Objective:     Communicated with RN prior to session.  Patient found HOB elevated with PEG Tube upon PT entry to room.     General Precautions: Standard, fall  Orthopedic Precautions: N/A  Braces: N/A  Respiratory Status: Room air  Skin Integrity: Visible skin intact      Functional Mobility:  Bed Mobility:     Supine to Sit: minimum assistance  Sit to Supine: minimum assistance  Transfers:     Sit to Stand:  minimum assistance with rolling walker  Gait: Pt amb 80ft with RW Radha. Pt requires assist with navigating RW. Tandem walking noted with cues to widen ZHEN. Lob noted.    Therapeutic Activities/Exercises:  Performed 10 sit<>stands with RW Radha. Pt stood HHA while marching in place 30"x2    Education:  Patient provided with verbal education regarding POC.  Understanding was verbalized, however " additional teaching warranted.     Patient left with bed in chair position with all lines intact, call button in reach, and Niece present..    GOALS:   Multidisciplinary Problems       Physical Therapy Goals          Problem: Physical Therapy    Goal Priority Disciplines Outcome Goal Variances Interventions   Physical Therapy Goal     PT, PT/OT Ongoing, Progressing     Description: Goals to be met by: 2023     Patient will increase functional independence with mobility by performin. Supine to sit with Modified [place]  2. Sit to stand transfer with Stand-by Assistance  3. Gait  x 200 feet with Stand-by Assistance using Rolling Walker.                          Time Tracking:     PT Received On: 23  PT Start Time: 1023     PT Stop Time: 1048  PT Total Time (min): 25 min     Billable Minutes: Gait Training 15 and Therapeutic Activity 10    Treatment Type: Treatment  PT/PTA: PTA     Number of PTA visits since last PT visit: 3     2023

## 2023-07-24 NOTE — PLAN OF CARE
Problem: Adult Inpatient Plan of Care  Goal: Plan of Care Review  Outcome: Ongoing, Progressing  Goal: Patient-Specific Goal (Individualized)  Outcome: Ongoing, Progressing  Goal: Absence of Hospital-Acquired Illness or Injury  Outcome: Ongoing, Progressing  Goal: Optimal Comfort and Wellbeing  Outcome: Ongoing, Progressing  Goal: Readiness for Transition of Care  Outcome: Ongoing, Progressing     Problem: Impaired Wound Healing          Goal: Optimal Wound Healing  Outcome: Ongoing, Progressing     Problem: Skin Injury Risk Increased  Goal: Skin Health and Integrity  Outcome: Ongoing, Progressing     Problem: Fall Injury Risk  Goal: Absence of Fall and Fall-Related Injury  Outcome: Ongoing, Progressing

## 2023-07-24 NOTE — PT/OT/SLP PROGRESS
Occupational Therapy   Treatment    Name: Cristopher Farooq  MRN: [id number]    Recommendations:     Discharge Recommendations: nursing facility, skilled  Discharge Equipment Recommendations:  to be determined by next level of care  Barriers to discharge:   (awaiting placement)    Assessment:     Cristopher Farooq is a 67 y.o. male with a medical diagnosis of Fall: head lac, acute cystitis w/o hematuria, parkinsons dx. Performance deficits affecting function: weakness, impaired functional mobility, impaired coordination, impaired self care skills. Pt agreed to participate in therapy today. He presents with N/V sitting EOB; unable to perform ADLs though initially agreeable.     Rehab Prognosis:  Good; patient would benefit from acute skilled OT services to address these deficits and reach maximum level of function.       Plan:     Patient to be seen 5 x/week to address the above listed problems via self-care/home management, therapeutic activities, therapeutic exercises  Plan of Care Expires: 08/02/23  Plan of Care Reviewed with: patient    Subjective     Pain/Comfort:  Pain Rating 1: 0/10    Objective:     Communicated with: NSG prior to session.  Patient found HOB elevated with PEG Tube, peripheral IV upon OT entry to room.    General Precautions: Standard, fall    Orthopedic Precautions:N/A  Braces: N/A  Respiratory Status: Room air  Vital Signs: WNL throughout     Occupational Performance:     Bed Mobility:    Patient completed Supine to Sit with minimum assistance  Patient completed Sit to Supine with minimum assistance     Functional Mobility/Transfers:  Patient completed Sit <> Stand Transfer with minimum assistance  with  rolling walker   Functional Mobility: able to take lateral steps towards HOB with CGA using RW    Activities of Daily Living:  Attempted however Pt became nauseous     Therapeutic Positioning    OT interventions performed during the course of today's session in an effort to prevent and/or reduce acquired  pressure injuries:   Education on Pressure Ulcer Prevention provided  Therapeutic positioning completed       Patient Education:  Patient provided with verbal education regarding OT role/goals/POC, fall prevention, safety awareness, Discharge/DME recommendations, and pressure ulcer prevention.  Understanding was verbalized.      Patient left HOB elevated with all lines intact, call button in reach, NSG notified, and niece present    GOALS:   Multidisciplinary Problems       Occupational Therapy Goals          Problem: Occupational Therapy    Goal Priority Disciplines Outcome Interventions   Occupational Therapy Goal     OT, PT/OT Ongoing, Progressing    Description: Goals to be met by: 8/2/23     Patient will increase functional independence with ADLs by performing:    UE Dressing with CGA.  LE Dressing with CGA.  Grooming while standing at sink with Modified [place].  Toileting from toilet with Stand-by Assistance for hygiene and clothing management.   Toilet transfer to toilet with Stand-by Assistance.                         Time Tracking:     OT Date of Treatment:    OT Start Time: 1408  OT Stop Time: 1428  OT Total Time (min): 20 min    Billable Minutes:Therapeutic Activity 1    OT/KWASI: OT     Number of KWASI visits since last OT visit: 1 7/24/2023

## 2023-08-31 ENCOUNTER — HOSPITAL ENCOUNTER (EMERGENCY)
Facility: HOSPITAL | Age: 68
Discharge: HOME | End: 2023-09-01
Attending: INTERNAL MEDICINE
Payer: MEDICAID

## 2023-08-31 DIAGNOSIS — S70.01XA CONTUSION OF RIGHT HIP, INITIAL ENCOUNTER: ICD-10-CM

## 2023-08-31 DIAGNOSIS — S01.511A LACERATION OF VERMILION BORDER OF UPPER LIP, INITIAL ENCOUNTER: ICD-10-CM
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
## 2023-09-01 ENCOUNTER — HOSPITAL ENCOUNTER (EMERGENCY)
Facility: HOSPITAL | Age: 68
Discharge: HOME | End: 2023-09-02
Attending: EMERGENCY MEDICINE
Payer: MEDICAID

## 2023-09-01 VITALS
WEIGHT: 116 LBS | TEMPERATURE: 98 F | HEART RATE: 72 BPM | BODY MASS INDEX: 21.35 KG/M2 | DIASTOLIC BLOOD PRESSURE: 73 MMHG | HEIGHT: 62 IN | SYSTOLIC BLOOD PRESSURE: 104 MMHG | RESPIRATION RATE: 20 BRPM | OXYGEN SATURATION: 95 %

## 2023-09-01 DIAGNOSIS — K94.23 LEAKING PEG TUBE: Primary | ICD-10-CM

## 2023-09-01 LAB
ALBUMIN SERPL-MCNC: 4.2 G/DL (ref 3.4–4.8)
ALBUMIN/GLOB SERPL: 1.1 RATIO (ref 1.1–2)
ALP SERPL-CCNC: 170 UNIT/L (ref 40–150)
ALT SERPL-CCNC: 22 UNIT/L (ref 0–55)
AST SERPL-CCNC: 50 UNIT/L (ref 5–34)
BASOPHILS # BLD AUTO: 0.09 X10(3)/MCL
BASOPHILS NFR BLD AUTO: 0.8 %
BILIRUB SERPL-MCNC: 1 MG/DL
BUN SERPL-MCNC: 18.3 MG/DL (ref 8.4–25.7)
CALCIUM SERPL-MCNC: 9.7 MG/DL (ref 8.8–10)
CHLORIDE SERPL-SCNC: 102 MMOL/L (ref 98–107)
CO2 SERPL-SCNC: 26 MMOL/L (ref 23–31)
CREAT SERPL-MCNC: 0.84 MG/DL (ref 0.73–1.18)
EOSINOPHIL # BLD AUTO: 0.19 X10(3)/MCL (ref 0–0.9)
EOSINOPHIL NFR BLD AUTO: 1.7 %
ERYTHROCYTE [DISTWIDTH] IN BLOOD BY AUTOMATED COUNT: 15.3 % (ref 11.5–17)
GFR SERPLBLD CREATININE-BSD FMLA CKD-EPI: >60 MLS/MIN/1.73/M2
GLOBULIN SER-MCNC: 3.7 GM/DL (ref 2.4–3.5)
GLUCOSE SERPL-MCNC: 93 MG/DL (ref 82–115)
HCT VFR BLD AUTO: 39.8 % (ref 42–52)
HGB BLD-MCNC: 13.1 G/DL (ref 14–18)
IMM GRANULOCYTES # BLD AUTO: 0.05 X10(3)/MCL (ref 0–0.04)
IMM GRANULOCYTES NFR BLD AUTO: 0.5 %
LYMPHOCYTES # BLD AUTO: 1.72 X10(3)/MCL (ref 0.6–4.6)
LYMPHOCYTES NFR BLD AUTO: 15.8 %
MCH RBC QN AUTO: 30.8 PG (ref 27–31)
MCHC RBC AUTO-ENTMCNC: 32.9 G/DL (ref 33–36)
MCV RBC AUTO: 93.4 FL (ref 80–94)
MONOCYTES # BLD AUTO: 1.16 X10(3)/MCL (ref 0.1–1.3)
MONOCYTES NFR BLD AUTO: 10.6 %
NEUTROPHILS # BLD AUTO: 7.71 X10(3)/MCL (ref 2.1–9.2)
NEUTROPHILS NFR BLD AUTO: 70.6 %
NRBC BLD AUTO-RTO: 0 %
PLATELET # BLD AUTO: 302 X10(3)/MCL (ref 130–400)
PMV BLD AUTO: 10.8 FL (ref 7.4–10.4)
POTASSIUM SERPL-SCNC: 4.1 MMOL/L (ref 3.5–5.1)
PROT SERPL-MCNC: 7.9 GM/DL (ref 5.8–7.6)
RBC # BLD AUTO: 4.26 X10(6)/MCL (ref 4.7–6.1)
SODIUM SERPL-SCNC: 139 MMOL/L (ref 136–145)
WBC # SPEC AUTO: 10.92 X10(3)/MCL (ref 4.5–11.5)

## 2023-09-01 PROCEDURE — 80053 COMPREHEN METABOLIC PANEL: CPT | Performed by: NURSE PRACTITIONER

## 2023-09-01 PROCEDURE — 99284 EMERGENCY DEPT VISIT MOD MDM: CPT | Mod: 25

## 2023-09-01 PROCEDURE — 12011 RPR F/E/E/N/L/M 2.5 CM/<: CPT

## 2023-09-01 PROCEDURE — 25500020 PHARM REV CODE 255: Performed by: PHYSICIAN ASSISTANT

## 2023-09-01 PROCEDURE — 85025 COMPLETE CBC W/AUTO DIFF WBC: CPT | Performed by: NURSE PRACTITIONER

## 2023-09-01 PROCEDURE — 99285 EMERGENCY DEPT VISIT HI MDM: CPT | Mod: 25

## 2023-09-01 RX ORDER — AMOXICILLIN AND CLAVULANATE POTASSIUM 875; 125 MG/1; MG/1
1 TABLET, FILM COATED ORAL 2 TIMES DAILY
Qty: 20 TABLET | Refills: 0 | Status: SHIPPED | OUTPATIENT
Start: 2023-09-01 | End: 2023-09-11

## 2023-09-01 RX ADMIN — IOPAMIDOL 100 ML: 755 INJECTION, SOLUTION INTRAVENOUS at 09:09

## 2023-09-01 NOTE — ED PROVIDER NOTES
"     Source of History:  Patient, moderate limitations as non-verbal    Chief complaint:  Lip Laceration (Right upper lip laceration-arrive per aasi-nonverbal -from [facility])      HPI:  Stoney Hanson is a 67 y.o. male presenting with Lip Laceration (Right upper lip laceration-arrive per aasi-nonverbal -from [facility])       68yo M hx of dementia (non-verbal), CVA, presents for lip laceration, possible unwitnessed fall    Patient is here for evaluation after a fall. Patient reportedly was in unknown circumstances when he fell from unknown. The accident occurred a few hours ago. It is reported that the patient unknown if LOC. At this time NH staff reports right upper lip laceration. Tetanus is UTD        Review of Systems   Unable to obtain due non-verbal    Review of patient's allergies indicates:   Allergen Reactions    Fish containing products     Shellfish containing products     Tramadol        PMH:  As per HPI and below:    Past Medical History:   Diagnosis Date    Dysarthria     Dysphagia     Hypotension     Malaise     Parkinson's disease         No family history on file.    Past Surgical History:   Procedure Laterality Date    COLONOSCOPY  07/05/2018       Social History     Tobacco Use    Smoking status: Never    Smokeless tobacco: Never   Substance Use Topics    Drug use: Never       There is no problem list on file for this patient.       Physical Exam:    /76   Pulse 73   Temp 97.6 °F (36.4 °C)   Resp 20   Ht 5' 2" (1.575 m)   Wt 52.6 kg (116 lb)   SpO2 100%   BMI 21.22 kg/m²     Nursing note and vital signs reviewed.    General:  Alert, non-verbal  Skin: Normal for Ethnic Origin, large right upper lip laceration through [place] boarder, moderate bruising noted to right hip area  HEENT: see skin above Vision unchanged, Pupils symmetric, No icterus , Nasal mucosa is pink and moist  Cardiovascular:  Regular rate and rhythm, No edema  Chest Wall: No deformity, equal chest rise  Respiratory:  " Lungs are clear to auscultation, respirations are non-labored.    Musculoskeletal:  right hip bruising Normal perfusion to all extremities  Gastrointestinal:  Soft, Non distended  Neurological:  Alert and baseline mental state per EMS and NH staff          Labs that have been ordered have been independently reviewed and interpreted by myself.     Old Chart Reviewed.      Initial Impression/ Differential Dx:  Soft tissue injuries, orthopedic injury such as fractures, dislocations or sprains, head trauma, intracranial injury including concussion, intracranial hemorrghage, spinal cord and column injuries, viscous or solid organ injury of the abdomen, thoracic trauma including pulmonary contusion, pneumothorax, hemothorax, rib fractures, aortic injury, tracheobronchial tree injury, facial trauma, contusions, abrasions       MDM:      Reviewed Nurses Note.    Reviewed Pertinent old records.    Orders Placed This Encounter    LACERATION REPAIR    CT Head Without Contrast    CT Cervical Spine Without Contrast    CT Maxillofacial Without Contrast    X-Ray Hip 2 or 3 views Right (with Pelvis when performed)    amoxicillin-clavulanate 875-125mg (AUGMENTIN) 875-125 mg per tablet                    Labs Reviewed - No data to display       CT Head Without Contrast         CT Maxillofacial Without Contrast         CT Cervical Spine Without Contrast         X-Ray Hip 2 or 3 views Right (with Pelvis when performed)    (Results Pending)        No visits with results within 1 Day(s) from this visit.   Latest known visit with results is:   Lab Requisition on 08/15/2023   Component Date Value Ref Range Status    Iron Binding Capacity Unsaturated 08/15/2023 225  69 - 240 ug/dL Final    Iron Level 08/15/2023 70  65 - 175 ug/dL Final    Transferrin 08/15/2023 264  163 - 344 mg/dL Final    Iron Binding Capacity Total 08/15/2023 295  250 - 450 ug/dL Final    Iron Saturation 08/15/2023 24  20 - 50 % Final    Thyroid Stimulating Hormone  08/15/2023 1.891  0.350 - 4.940 uIU/mL Final    Prealbumin 08/15/2023 30.5  16.0 - 42.0 mg/dL Final    Cholesterol Total 08/15/2023 205 (H)  <=200 mg/dL Final    HDL Cholesterol 08/15/2023 46  35 - 60 mg/dL Final    Triglyceride 08/15/2023 74  34 - 140 mg/dL Final    Cholesterol/HDL Ratio 08/15/2023 4  0 - 5 Final    Very Low Density Lipoprotein 08/15/2023 15   Final    LDL Cholesterol 08/15/2023 144.00 (H)  50.00 - 140.00 mg/dL Final    Ferritin Level 08/15/2023 542.50 (H)  21.81 - 274.66 ng/mL Final    Vit D 25 OH 08/15/2023 44.3  30.0 - 80.0 ng/mL Final    Sodium Level 08/15/2023 144  136 - 145 mmol/L Final    Potassium Level 08/15/2023 3.9  3.5 - 5.1 mmol/L Final    Chloride 08/15/2023 104  98 - 107 mmol/L Final    Carbon Dioxide 08/15/2023 24  23 - 31 mmol/L Final    Glucose Level 08/15/2023 65 (L)  82 - 115 mg/dL Final    Blood Urea Nitrogen 08/15/2023 14.6  8.4 - 25.7 mg/dL Final    Creatinine 08/15/2023 0.87  0.73 - 1.18 mg/dL Final    Calcium Level Total 08/15/2023 9.7  8.8 - 10.0 mg/dL Final    Protein Total 08/15/2023 7.7 (H)  5.8 - 7.6 gm/dL Final    Albumin Level 08/15/2023 3.8  3.4 - 4.8 g/dL Final    Globulin 08/15/2023 3.9 (H)  2.4 - 3.5 gm/dL Final    Albumin/Globulin Ratio 08/15/2023 1.0 (L)  1.1 - 2.0 ratio Final    Bilirubin Total 08/15/2023 0.7  <=1.5 mg/dL Final    Alkaline Phosphatase 08/15/2023 179 (H)  40 - 150 unit/L Final    Alanine Aminotransferase 08/15/2023 69 (H)  0 - 55 unit/L Final    Aspartate Aminotransferase 08/15/2023 28  5 - 34 unit/L Final    eGFR 08/15/2023 >60  mls/min/1.73/m2 Final    WBC 08/15/2023 7.09  4.50 - 11.50 x10(3)/mcL Final    RBC 08/15/2023 4.65 (L)  4.70 - 6.10 x10(6)/mcL Final    Hgb 08/15/2023 14.1  14.0 - 18.0 g/dL Final    Hct 08/15/2023 44.5  42.0 - 52.0 % Final    MCV 08/15/2023 95.7 (H)  80.0 - 94.0 fL Final    MCH 08/15/2023 30.3  27.0 - 31.0 pg Final    MCHC 08/15/2023 31.7 (L)  33.0 - 36.0 g/dL Final    RDW 08/15/2023 14.7  11.5 - 17.0 % Final     Platelet 08/15/2023 282  130 - 400 x10(3)/mcL Final    MPV 08/15/2023 12.7 (H)  7.4 - 10.4 fL Final    Neut % 08/15/2023 57.8  % Final    Lymph % 08/15/2023 26.5  % Final    Mono % 08/15/2023 9.2  % Final    Eos % 08/15/2023 4.5  % Final    Basophil % 08/15/2023 1.6  % Final    Lymph # 08/15/2023 1.88  0.6 - 4.6 x10(3)/mcL Final    Neut # 08/15/2023 4.10  2.1 - 9.2 x10(3)/mcL Final    Mono # 08/15/2023 0.65  0.1 - 1.3 x10(3)/mcL Final    Eos # 08/15/2023 0.32  0 - 0.9 x10(3)/mcL Final    Baso # 08/15/2023 0.11  <=0.2 x10(3)/mcL Final    IG# 08/15/2023 0.03  0 - 0.04 x10(3)/mcL Final    IG% 08/15/2023 0.4  % Final    NRBC% 08/15/2023 0.0  % Final       Imaging Results              X-Ray Hip 2 or 3 views Right (with Pelvis when performed) (In process)                      CT Head Without Contrast (Preliminary result)  Result time 09/01/23 00:33:42      Preliminary result by Shree Meneses Jr., MD (09/01/23 00:33:42)                   Narrative:    START OF REPORT:  TECHNIQUE: CT OF THE HEAD WAS PERFORMED WITHOUT INTRAVENOUS CONTRAST WITH AXIAL AS WELL AS CORONAL AND SAGITTAL IMAGES.    COMPARISON: NONE.    DOSAGE INFORMATION: AUTOMATED EXPOSURE CONTROL WAS UTILIZED.    CLINICAL HISTORY: FACIAL TRAUMA.    Findings:  Artifact: There is mild moderate motion artifact on many of the images which decreases sensitivity and specificity of the study for subtle intracranial hemorrhage and subtle nondisplaced fractures.  Hemorrhage: No acute intracranial hemorrhage is seen.  CSF spaces: The ventricles, sulci and basal cisterns all appear mildly prominent consistent with global cerebral atrophy. Additionally the ventricles appear dilated out of proportion to the sulci suggesting an element of concurrent non-obstructive hydrocephalus.  Brain parenchyma: Mild microvascular change is seen in portions of the periventricular and deep white matter tracts.  Cerebellum: Unremarkable.  Sella and skull base: The sella appears to be  within normal limits for age.  Intracranial calcifications: Incidental note is made of bilateral choroid plexus calcification. Incidental note is made of some pineal region calcification.  Calvarium: No acute linear or depressed skull fracture is seen.    Maxillofacial Structures: Maxillofacial findings discussed separately in the maxillofacial CT report.      Impression:  1. No acute intracranial process identified. Details and findings as noted above.                                         CT Maxillofacial Without Contrast (Preliminary result)  Result time 09/01/23 00:33:42      Preliminary result by Shree Meneses Jr., MD (09/01/23 00:33:42)                   Narrative:    START OF REPORT:  TECHNIQUE: NONCONTRAST MAXILLOFACIAL CT WAS PERFORMED WITH AXIAL AS WELL AS SAGITTAL AND CORONAL IMAGES BEING SUBMITTED FOR INTERPRETATION.    COMPARISON: NONE.    CLINICAL HISTORY: FACIAL TRAUMA.    Findings: There is mild motion artifact on a few of the images which decreases sensitivity and specificity for subtle non displaced fractures.  Facial soft tissues: There is mild soft tissue irregularity along the right upper lip seen on series 8 image 17, which may reflect a lacerated wound.  Bones:  Orbital bony structures: The bilateral orbital bony structures are intact with no orbital fracture identified.  Mandible: The mandible appears unremarkable.  Maxilla: The maxilla appears unremarkable.  Pterygoid plates: No fracture identified of the right or left pterygoid plates.  Zygoma: The zygomatic arches are intact.  TMJ: The mandibular condyles appear normally placed with respect to the mandibular fossa.  Nasal Bones: No displaced nasal bone fracture is seen.  Skull: No acute linear or depressed fracture is identified in the visualized skull.  Paranasal sinuses: The visualized paranasal sinuses appear clear with no significant mucoperiosteal thickening or air fluid levels identified.  Mastoid air cells: The visualized  mastoid air cells appear clear.  Brain: Intracranial findings discussed separately.      Impression:  1. No acute maxillofacial fracture identified. Details and findings as noted above.                                         CT Cervical Spine Without Contrast (Preliminary result)  Result time 09/01/23 00:32:52      Preliminary result by Shree Meneses Jr., MD (09/01/23 00:32:52)                   Narrative:    START OF REPORT:  TECHNIQUE: CT OF THE CERVICAL SPINE WAS PERFORMED WITHOUT INTRAVENOUS CONTRAST WITH AXIAL AS WELL AS SAGITTAL AND CORONAL IMAGES.    COMPARISON: NONE.    DOSAGE INFORMATION: AUTOMATED EXPOSURE CONTROL WAS UTILIZED.    CLINICAL HISTORY: NECK PAIN.    Findings:  Artifact: Mild motion artifact is present on multiple images. This decreases sensitivity and specificity of the study for subtle nondisplaced.  Lung apices: The visualized lung apices appear unremarkable.  Spine:  Spinal canal: The spinal canal appears unremarkable.  Mineralization: Within normal limits for age.  Rotation: Mild rotation of C1 with respect to C2 is seen.  Scoliosis: Mild dextroscoliosis.  Vertebral Fusion: No vertebral fusion is identified.  Listhesis: No significant listhesis is identified.  Lordosis: Mild straightening of the cervical lordosis is seen. This may be positional or reflect an element of myospasm.  Intervertebral disc spaces: Multilevel loss of disc height is seen.  Osteophytes: Mild multilevel endplate osteophytes are seen.  Endplate Sclerosis: Mild multilevel endplate sclerosis is seen.  Uncovertebral degenerative changes: Mild multilevel uncovertebral joint arthrosis is seen.  Facet degenerative changes: Mild multilevel facet degenerative changes are seen.  Fractures: No acute cervical spine fracture dislocation or subluxation is seen.  Orthopedic Hardware: None.    Miscellaneous:  Mastoid air cells: The visualized mastoid air cells appear clear.  Soft Tissues: Unremarkable.      Impression:  1.  No acute cervical spine fracture dislocation or subluxation is seen.  2. Degenerative changes and other details as above.                                                                     Lac Repair    Date/Time: 9/1/2023 1:30 AM    Performed by: Alexy Susnhine DO  Authorized by: Alexy Sunshine DO    Consent:     Consent obtained:  Verbal    Consent given by:  Healthcare agent    Risks, benefits, and alternatives were discussed: yes      Risks discussed:  Infection, pain, poor cosmetic result and poor wound healing    Alternatives discussed:  No treatment, delayed treatment, observation and referral  Universal protocol:     Patient identity confirmed:  Hospital-assigned identification number  Laceration details:     Location:  Lip    Lip location:  Upper exterior lip    Length (cm):  1.5  Pre-procedure details:     Preparation:  Imaging obtained to evaluate for foreign bodies  Exploration:     Hemostasis achieved with:  Direct pressure    Imaging outcome: foreign body not noted      Wound exploration: wound explored through full range of motion and entire depth of wound visualized      Contaminated: no    Treatment:     Area cleansed with:  Saline    Amount of cleaning:  Standard    Irrigation solution:  Sterile saline    Irrigation volume:  20 cc    Irrigation method:  Syringe    Debridement:  Minimal  Skin repair:     Repair method: dermabond.  Approximation:     Approximation:  Close    [place] border well-aligned: yes    Repair type:     Repair type:  Intermediate  Post-procedure details:     Procedure completion:  Tolerated well, no immediate complications            Diagnostic Impression:    1. Fall, initial encounter    2. Contusion of right hip, initial encounter    3. Laceration of [place]mil[place] border of upper lip, initial encounter         ED Disposition Condition    Discharge Stable             Follow-up Information       [facility] - Emergency Dept.    Specialty:  Emergency Medicine  Why: If symptoms worsen  Contact information:  [address]  [facility] [address]  [phone]                            ED Prescriptions       Medication Sig Dispense Start Date End Date Auth. Provider    amoxicillin-clavulanate 875-125mg (AUGMENTIN) 875-125 mg per tablet Take 1 tablet by mouth 2 (two) times daily. for 10 days 20 tablet 9/1/2023 9/11/2023 Alexy Sunshine,           Follow-up Information       Follow up With Specialties Details Why Contact Info    [facility] - Emergency Dept Emergency Medicine  If symptoms worsen [address]  [facility] [address]  [phone]             Alexy Sunshine DO  09/01/23 0221

## 2023-09-01 NOTE — FIRST PROVIDER EVALUATION
Medical screening examination initiated.  I have conducted a focused provider triage encounter, findings are as follows:    Brief history of present illness:  66 y/o male presents with niece for concern for PEG being infected. She states she noticed a lot of drainage from it and he told her that they weren't cleaning it.     There were no vitals filed for this visit.    Pertinent physical exam:  alert, PEG noted to left side of abdomen, there was a little bit of drainage noted but skin around is really not red. Abdomen soft. Afebrile     Brief workup plan:  labs    Preliminary workup initiated; this workup will be continued and followed by the physician or advanced practice provider that is assigned to the patient when roomed.

## 2023-09-02 VITALS
TEMPERATURE: 98 F | SYSTOLIC BLOOD PRESSURE: 105 MMHG | DIASTOLIC BLOOD PRESSURE: 68 MMHG | WEIGHT: 120 LBS | RESPIRATION RATE: 17 BRPM | OXYGEN SATURATION: 100 % | BODY MASS INDEX: 19.97 KG/M2 | HEART RATE: 66 BPM

## 2023-09-02 RX ORDER — BACITRACIN ZINC 500 UNIT/G
OINTMENT (GRAM) TOPICAL 2 TIMES DAILY
Qty: 30 G | Refills: 0 | Status: SHIPPED | OUTPATIENT
Start: 2023-09-02

## 2023-09-02 NOTE — ED PROVIDER NOTES
Encounter Date: 9/1/2023    SCRIBE #1 NOTE: I, Linda Mays, am scribing for, and in the presence of,  Inocente Jensen III, MD. I have scribed the following portions of the note - Other sections scribed: HPI, ROS, PE.       History     Chief Complaint   Patient presents with    PEG tube problem     Pt was just d/c from a skilled nursing home and family member was assessing the peg site and stated it looked infected and had drainage . Site is clean and intact, no redness.  slight drainage noted. Pt reports pain at the peg site. - fever     A 67 year old male with a history of Parkinson's disease is presenting to the ED with c/o PEG tube problem. Per the pt's daughter who is bedside, the pt was discharged from a skilled nursing home earlier today. She states that she noticed that there was no dressing around his PEG tube site and reports drainage from the site. She was concerned for the possibility of an infection and so brought him in for evaluation. She denies any fever.    The history is provided by a relative. No  was used.     Review of patient's allergies indicates:   Allergen Reactions    Fish containing products     Shellfish containing products     Tramadol     Procaine Rash     Past Medical History:   Diagnosis Date    Parkinson's disease     Rhabdomyolysis     Unspecified glaucoma      Past Surgical History:   Procedure Laterality Date    ABDOMINAL SURGERY      CHOLECYSTECTOMY      ESOPHAGOGASTRODUODENOSCOPY W/ PEG N/A 7/21/2023    Procedure: PEG;  Surgeon: Primitivo Duke MD;  Location: [facility] ENDOSCOPY;  Service: Gastroenterology;  Laterality: N/A;    FOOT SURGERY       Family History   Family history unknown: Yes     Social History     Tobacco Use    Smoking status: Unknown     Review of Systems   Constitutional:  Negative for fatigue, fever and unexpected weight change.   HENT:  Negative for congestion and rhinorrhea.    Eyes:  Negative for pain.   Respiratory:  Negative for chest  tightness, shortness of breath and wheezing.    Cardiovascular:  Negative for chest pain.   Gastrointestinal:  Negative for abdominal pain, constipation, diarrhea, nausea and vomiting.        Drainage from PEG tube site   Genitourinary:  Negative for dysuria.   Musculoskeletal:  Negative for back pain and neck pain.   Skin:  Negative for rash.   Allergic/Immunologic: Negative for environmental allergies, food allergies and immunocompromised state.   Neurological:  Negative for dizziness and speech difficulty.   Hematological:  Does not bruise/bleed easily.   Psychiatric/Behavioral:  Negative for sleep disturbance and suicidal ideas.        Physical Exam     Initial Vitals [09/01/23 1642]   BP Pulse Resp Temp SpO2   (!) 144/70 79 19 97.9 °F (36.6 °C) 95 %      MAP       --         Physical Exam    Nursing note and vitals reviewed.  Constitutional: He appears well-developed and well-nourished.   HENT:   Head: Normocephalic and atraumatic.   Eyes: EOM are normal. Pupils are equal, round, and reactive to light.   Neck:   Normal range of motion.  Cardiovascular:  Normal rate, regular rhythm, normal heart sounds and intact distal pulses.           Pulmonary/Chest: Breath sounds normal.   Abdominal: Abdomen is soft. Bowel sounds are normal.   PEG tube to the epigastrium, c/d/I    Musculoskeletal:         General: Normal range of motion.      Cervical back: Normal range of motion.     Neurological: He is alert and oriented to person, place, and time. He has normal strength. GCS score is 15. GCS eye subscore is 4. GCS verbal subscore is 5. GCS motor subscore is 6.   Skin: Skin is warm and dry. Capillary refill takes less than 2 seconds.   Psychiatric: He has a normal mood and affect. Judgment normal.         ED Course   Procedures  Labs Reviewed   CBC WITH DIFFERENTIAL - Abnormal; Notable for the following components:       Result Value    RBC 4.26 (*)     Hgb 13.1 (*)     Hct 39.8 (*)     MCHC 32.9 (*)     MPV 10.8 (*)      IG# 0.05 (*)     All other components within normal limits   COMPREHENSIVE METABOLIC PANEL - Abnormal; Notable for the following components:    Protein Total 7.9 (*)     Globulin 3.7 (*)     Alkaline Phosphatase 170 (*)     Aspartate Aminotransferase 50 (*)     All other components within normal limits          Imaging Results              CT Abdomen Pelvis With Contrast (Final result)  Result time 09/01/23 21:46:56      Final result by Los Crenshaw MD (09/01/23 21:46:56)                   Impression:      1. Multiple new fracture deformities of the right posterior ribs.  These fracture deformities however do not appear to be acute.  Please correlate clinically.    2. Similar dilatation of the intrahepatic and the extrahepatic ducts post cholecystectomy.    3. Constipation without bowel obstruction.      Electronically signed by: Los Crenshaw  Date:    09/01/2023  Time:    21:46               Narrative:    EXAMINATION:  CT ABDOMEN PELVIS WITH CONTRAST    CLINICAL HISTORY:  Abdominal pain, post-op;PEG placement on 7/21, pain/drainage at site;    TECHNIQUE:  Multidetector axial images were obtained of the abdomen and pelvis following the administration of IV contrast. Oral contrast was not administered.    Dose length product of 518 mGycm. Automated exposure control was utilized to minimize radiation dose.    COMPARISON:  March 11, 2023    FINDINGS:  Included portion of the lungs are without suspicious nodularity, acute air space infiltrates or fluid within the pleural spaces.  There are non acute appearing fracture deformities of the right posterior 8th, 9th, 10th and 11th ribs.  These fracture deformities are new since the previous exam.    Multiple hepatic hypodense lesions remain stable and are favored to be cysts.  Post cholecystectomy, there is dilatation of the intrahepatic biliary radicles and the extrahepatic duct measures 1.2 cm without calcified choledocholithiasis. Pancreatic unremarkable attenuation  without acute peripancreatic phlegmons. Main pancreatic duct is not dilated. Spleen is of normal size without focal lesion.    The adrenal glands appear within normal limits.  Stable size of right kidney parapelvic and cortical exophytic cyst.  Left kidney lower pole non occluding calculus.  Kidneys enhancement is unremarkable and there is no hydronephrosis or hydroureter.  No perinephric strandings.  There are no retroperitoneal periaortic enlarged lymph nodes.    Stomach is partially decompressed.  There is a gastrostomy tube in optimal position.  No free air or free fluid.  There is no abnormal dilatation of loops of small bowel.  Left abdomen bowel operative changes sutures.  Appendix is not visualized.  Colonic fecal loading throughout.  Colon is nondistended.  There are no pericolonic acute strandings.  No suggestion of bowel obstruction.    Urinary bladder wall is not thickened.  Prostate is enlarged with dystrophic calcifications.  There is no intrapelvic free fluid.                                       Medications   iopamidoL (ISOVUE-370) injection 100 mL (100 mLs Intravenous Given 9/1/23 2111)     Medical Decision Making  Differential diagnosis include but are not limited to: PEG tube displacement and PEG tube malfunction.     PEG tube without drainage on physical exam CBC chemistry CT all without abnormality.  Also had chantell discussion with the patient's niece patient is in the emergency room less than 24 hours after patient was discharged from LTAC she states they are able to take care of him but was just worried about the PEG tube she does have some concerns about his medications specifically his amiodarone which he was not in before she has an appointment on Wednesday instructed patient family just keep current medications up until then    Problems Addressed:  Leaking PEG tube: complicated acute illness or injury    Amount and/or Complexity of Data Reviewed  Labs: ordered. Decision-making details  documented in ED Course.  Radiology: ordered and independent interpretation performed. Decision-making details documented in ED Course.    Risk  OTC drugs.            Scribe Attestation:   Scribe #1: I performed the above scribed service and the documentation accurately describes the services I performed. I attest to the accuracy of the note.    Attending Attestation:           Physician Attestation for Scribe:  Physician Attestation Statement for Scribe #1: I, Inocente Jensen III, MD, reviewed documentation, as scribed by Linda Mays in my presence, and it is both accurate and complete.                             Clinical Impression:   Final diagnoses:  [K94.23] Leaking PEG tube (Primary)        ED Disposition Condition    Discharge Stable          ED Prescriptions       Medication Sig Dispense Start Date End Date Auth. Provider    bacitracin 500 unit/gram Oint Apply topically 2 (two) times daily. 30 g 9/2/2023 -- Inocente Jensen III, MD          Follow-up Information       Follow up With Specialties Details Why Contact Info    Joel Castillo MD Internal Medicine In 3 days  [address] [facility]  [address] [place] [address]  [phone]               Inocente Jensen III, MD  09/02/23 4992

## 2023-09-12 ENCOUNTER — OFFICE VISIT (OUTPATIENT)
Dept: OPHTHALMOLOGY | Facility: CLINIC | Age: 68
End: 2023-09-12
Attending: OPHTHALMOLOGY
Payer: MEDICAID

## 2023-09-12 VITALS — HEIGHT: 65 IN | BODY MASS INDEX: 19.99 KG/M2 | WEIGHT: 120 LBS

## 2023-09-12 DIAGNOSIS — H25.13 CATARACT, NUCLEAR SCLEROTIC, BOTH EYES: Primary | ICD-10-CM

## 2023-09-12 DIAGNOSIS — H40.1190 PRIMARY OPEN ANGLE GLAUCOMA, UNSPECIFIED GLAUCOMA STAGE, UNSPECIFIED LATERALITY: ICD-10-CM

## 2023-09-12 PROCEDURE — 92136 OPHTHALMIC BIOMETRY: CPT | Mod: PBBFAC,PO | Performed by: STUDENT IN AN ORGANIZED HEALTH CARE EDUCATION/TRAINING PROGRAM

## 2023-09-12 PROCEDURE — 99213 OFFICE O/P EST LOW 20 MIN: CPT | Mod: PBBFAC,PO | Performed by: STUDENT IN AN ORGANIZED HEALTH CARE EDUCATION/TRAINING PROGRAM

## 2023-09-12 RX ORDER — CIPROFLOXACIN 500 MG/1
500 TABLET ORAL EVERY 12 HOURS
COMMUNITY
Start: 2023-07-17

## 2023-09-12 RX ORDER — BRIMONIDINE TARTRATE 2 MG/ML
1 SOLUTION/ DROPS OPHTHALMIC 2 TIMES DAILY
COMMUNITY
Start: 2023-09-01

## 2023-09-12 RX ORDER — LATANOPROST 50 UG/ML
1 SOLUTION/ DROPS OPHTHALMIC NIGHTLY
COMMUNITY
Start: 2023-08-26 | End: 2023-09-18 | Stop reason: SDUPTHER

## 2023-09-12 RX ORDER — ALPRAZOLAM 0.5 MG/1
0.25 TABLET ORAL 2 TIMES DAILY
COMMUNITY
Start: 2023-06-06

## 2023-09-12 RX ORDER — CARBIDOPA AND LEVODOPA 25; 100 MG/1; MG/1
1 TABLET, EXTENDED RELEASE ORAL 4 TIMES DAILY
COMMUNITY
Start: 2023-04-06

## 2023-09-12 RX ORDER — BUSPIRONE HYDROCHLORIDE 10 MG/1
10 TABLET ORAL 3 TIMES DAILY
COMMUNITY
Start: 2023-09-06

## 2023-09-12 RX ORDER — SULFAMETHOXAZOLE AND TRIMETHOPRIM 800; 160 MG/1; MG/1
1 TABLET ORAL 2 TIMES DAILY
COMMUNITY
Start: 2023-06-20

## 2023-09-12 NOTE — PROGRESS NOTES
Assessment /Plan     For exam results, see Encounter Report.    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 NSC, OU  - likely VS  - difficult to refract  - family will discuss surgery and call back  - IOL calcs done 9/12/23    3. Blepharitis, OU  - Wcs, LS    RTC4 mo IOP check

## 2023-09-18 DIAGNOSIS — H40.1190 PRIMARY OPEN ANGLE GLAUCOMA, UNSPECIFIED GLAUCOMA STAGE, UNSPECIFIED LATERALITY: Primary | ICD-10-CM

## 2023-09-18 RX ORDER — LATANOPROST 50 UG/ML
1 SOLUTION/ DROPS OPHTHALMIC NIGHTLY
Qty: 2.5 ML | Refills: 5 | Status: SHIPPED | OUTPATIENT
Start: 2023-09-18

## 2023-09-18 RX ORDER — BRIMONIDINE TARTRATE 1 MG/ML
1 SOLUTION/ DROPS OPHTHALMIC 3 TIMES DAILY
Qty: 15 ML | Refills: 5 | Status: SHIPPED | OUTPATIENT
Start: 2023-09-18

## (undated) DEVICE — BINDER ABDOMINAL UNIV XLN 10IN

## (undated) DEVICE — TUBING O2 FEMALE CONN 13FT

## (undated) DEVICE — KIT SURGICAL COLON .25 1.1OZ

## (undated) DEVICE — KIT CANIST SUCTION 1200CC

## (undated) DEVICE — SOL IRRI STRL WATER 1000ML

## (undated) DEVICE — KIT PEG PULL SAFETY 24FR

## (undated) DEVICE — COLLECTION SPECIMEN NEPTUNE

## (undated) DEVICE — TIP SUCTION YANKAUER